# Patient Record
Sex: FEMALE | Race: WHITE | NOT HISPANIC OR LATINO | Employment: OTHER | URBAN - METROPOLITAN AREA
[De-identification: names, ages, dates, MRNs, and addresses within clinical notes are randomized per-mention and may not be internally consistent; named-entity substitution may affect disease eponyms.]

---

## 2017-01-10 ENCOUNTER — OFFICE VISIT - HEALTHEAST (OUTPATIENT)
Dept: CARDIOLOGY | Facility: CLINIC | Age: 77
End: 2017-01-10

## 2017-01-10 ENCOUNTER — AMBULATORY - HEALTHEAST (OUTPATIENT)
Dept: CARDIOLOGY | Facility: CLINIC | Age: 77
End: 2017-01-10

## 2017-01-10 DIAGNOSIS — I10 ESSENTIAL HYPERTENSION WITH GOAL BLOOD PRESSURE LESS THAN 140/90: ICD-10-CM

## 2017-01-10 DIAGNOSIS — R94.31 ABNORMAL EKG: ICD-10-CM

## 2017-01-10 DIAGNOSIS — I49.3 FREQUENT UNIFOCAL PREMATURE VENTRICULAR CONTRACTIONS: ICD-10-CM

## 2017-01-10 ASSESSMENT — MIFFLIN-ST. JEOR: SCORE: 1199.83

## 2017-01-12 ENCOUNTER — HOSPITAL ENCOUNTER (OUTPATIENT)
Dept: CARDIOLOGY | Facility: HOSPITAL | Age: 77
Discharge: HOME | End: 2017-01-12
Attending: INTERNAL MEDICINE

## 2017-01-12 DIAGNOSIS — R94.31 ABNORMAL EKG: ICD-10-CM

## 2017-01-12 LAB
CV STRESS CURRENT BP HE: NORMAL
CV STRESS CURRENT HR HE: 104
CV STRESS CURRENT HR HE: 104
CV STRESS CURRENT HR HE: 107
CV STRESS CURRENT HR HE: 116
CV STRESS CURRENT HR HE: 124
CV STRESS CURRENT HR HE: 125
CV STRESS CURRENT HR HE: 128
CV STRESS CURRENT HR HE: 61
CV STRESS CURRENT HR HE: 62
CV STRESS CURRENT HR HE: 64
CV STRESS CURRENT HR HE: 67
CV STRESS CURRENT HR HE: 67
CV STRESS CURRENT HR HE: 70
CV STRESS CURRENT HR HE: 72
CV STRESS CURRENT HR HE: 74
CV STRESS CURRENT HR HE: 78
CV STRESS CURRENT HR HE: 83
CV STRESS CURRENT HR HE: 85
CV STRESS DEVIATION TIME HE: NORMAL
CV STRESS EXERCISE STAGE HE: NORMAL
CV STRESS FINAL RESTING BP HE: NORMAL
CV STRESS FINAL RESTING HR HE: 64
CV STRESS MAX TREADMILL GRADE HE: 12
CV STRESS MAX TREADMILL SPEED HE: 2.5
CV STRESS PEAK DIA BP HE: NORMAL
CV STRESS PEAK SYS BP HE: NORMAL
CV STRESS PHASE HE: NORMAL
CV STRESS PROTOCOL HE: NORMAL
CV STRESS RESTING PT POSITION HE: NORMAL
CV STRESS RESTING PT POSITION HE: NORMAL
CV STRESS ST DEVIATION AMOUNT HE: NORMAL
CV STRESS ST DEVIATION ELEVATION HE: NORMAL
CV STRESS ST EVELATION AMOUNT HE: NORMAL
CV STRESS TEST TYPE HE: NORMAL
CV STRESS TOTAL STAGE TIME MIN 1 HE: NORMAL
STRESS ECHO BASELINE BP: NORMAL
STRESS ECHO BASELINE HR: 63
STRESS ECHO CALCULATED PERCENT HR: 87
STRESS ECHO LAST STRESS BP: NORMAL
STRESS ECHO LAST STRESS HR: 128
STRESS ECHO POST ESTIMATED WORKLOAD: 6.2
STRESS ECHO POST EXERCISE DUR MIN: NORMAL
STRESS ECHO TARGET HR: 124

## 2017-02-23 ENCOUNTER — COMMUNICATION - HEALTHEAST (OUTPATIENT)
Dept: CARDIOLOGY | Facility: CLINIC | Age: 77
End: 2017-02-23

## 2017-02-23 DIAGNOSIS — I49.3 PVC'S (PREMATURE VENTRICULAR CONTRACTIONS): ICD-10-CM

## 2017-05-30 ENCOUNTER — COMMUNICATION - HEALTHEAST (OUTPATIENT)
Dept: FAMILY MEDICINE | Facility: CLINIC | Age: 77
End: 2017-05-30

## 2017-06-19 ENCOUNTER — OFFICE VISIT - HEALTHEAST (OUTPATIENT)
Dept: FAMILY MEDICINE | Facility: CLINIC | Age: 77
End: 2017-06-19

## 2017-06-19 DIAGNOSIS — E55.9 VITAMIN D DEFICIENCY: ICD-10-CM

## 2017-06-19 DIAGNOSIS — E03.9 HYPOTHYROIDISM: ICD-10-CM

## 2017-06-19 DIAGNOSIS — M17.10 LOCALIZED OSTEOARTHROSIS, LOWER LEG: ICD-10-CM

## 2017-06-19 DIAGNOSIS — I49.3 FREQUENT UNIFOCAL PREMATURE VENTRICULAR CONTRACTIONS: ICD-10-CM

## 2017-06-19 DIAGNOSIS — I10 ESSENTIAL HYPERTENSION WITH GOAL BLOOD PRESSURE LESS THAN 140/90: ICD-10-CM

## 2017-06-19 DIAGNOSIS — L30.9 ECZEMA: ICD-10-CM

## 2017-06-19 DIAGNOSIS — M19.072 ARTHRITIS OF LEFT FOOT: ICD-10-CM

## 2017-06-19 DIAGNOSIS — C50.919 BREAST CANCER (H): ICD-10-CM

## 2017-06-19 DIAGNOSIS — E78.00 PURE HYPERCHOLESTEROLEMIA: ICD-10-CM

## 2017-06-19 DIAGNOSIS — Z00.00 MEDICARE ANNUAL WELLNESS VISIT, INITIAL: ICD-10-CM

## 2017-06-19 DIAGNOSIS — R00.1 SINUS BRADYCARDIA: ICD-10-CM

## 2017-06-19 LAB
CHOLEST SERPL-MCNC: 288 MG/DL
FASTING STATUS PATIENT QL REPORTED: YES
HDLC SERPL-MCNC: 77 MG/DL
LDLC SERPL CALC-MCNC: 197 MG/DL
TRIGL SERPL-MCNC: 69 MG/DL

## 2017-06-19 ASSESSMENT — MIFFLIN-ST. JEOR: SCORE: 1170.8

## 2017-07-03 ENCOUNTER — OFFICE VISIT - HEALTHEAST (OUTPATIENT)
Dept: FAMILY MEDICINE | Facility: CLINIC | Age: 77
End: 2017-07-03

## 2017-07-03 DIAGNOSIS — H66.92 ACUTE OTITIS MEDIA, LEFT: ICD-10-CM

## 2017-07-03 DIAGNOSIS — J06.9 VIRAL URI WITH COUGH: ICD-10-CM

## 2017-07-07 ENCOUNTER — RECORDS - HEALTHEAST (OUTPATIENT)
Dept: BONE DENSITY | Facility: CLINIC | Age: 77
End: 2017-07-07

## 2017-07-07 ENCOUNTER — RECORDS - HEALTHEAST (OUTPATIENT)
Dept: ADMINISTRATIVE | Facility: OTHER | Age: 77
End: 2017-07-07

## 2017-07-07 DIAGNOSIS — Z78.0 ASYMPTOMATIC MENOPAUSAL STATE: ICD-10-CM

## 2017-07-07 DIAGNOSIS — Z00.00 ENCOUNTER FOR GENERAL ADULT MEDICAL EXAMINATION WITHOUT ABNORMAL FINDINGS: ICD-10-CM

## 2017-07-24 ENCOUNTER — OFFICE VISIT - HEALTHEAST (OUTPATIENT)
Dept: FAMILY MEDICINE | Facility: CLINIC | Age: 77
End: 2017-07-24

## 2017-07-24 DIAGNOSIS — H65.93 OME (OTITIS MEDIA WITH EFFUSION), BILATERAL: ICD-10-CM

## 2017-07-24 DIAGNOSIS — J06.9 UPPER RESPIRATORY TRACT INFECTION, UNSPECIFIED TYPE: ICD-10-CM

## 2017-07-24 ASSESSMENT — MIFFLIN-ST. JEOR: SCORE: 1159

## 2017-08-16 ENCOUNTER — COMMUNICATION - HEALTHEAST (OUTPATIENT)
Dept: FAMILY MEDICINE | Facility: CLINIC | Age: 77
End: 2017-08-16

## 2017-08-16 DIAGNOSIS — E03.9 HYPOTHYROIDISM: ICD-10-CM

## 2017-08-31 ENCOUNTER — RECORDS - HEALTHEAST (OUTPATIENT)
Dept: ADMINISTRATIVE | Facility: OTHER | Age: 77
End: 2017-08-31

## 2017-09-01 ENCOUNTER — RECORDS - HEALTHEAST (OUTPATIENT)
Dept: ADMINISTRATIVE | Facility: OTHER | Age: 77
End: 2017-09-01

## 2017-12-30 ENCOUNTER — COMMUNICATION - HEALTHEAST (OUTPATIENT)
Dept: CARDIOLOGY | Facility: CLINIC | Age: 77
End: 2017-12-30

## 2017-12-30 DIAGNOSIS — I10 ESSENTIAL HYPERTENSION WITH GOAL BLOOD PRESSURE LESS THAN 140/90: ICD-10-CM

## 2018-01-19 ENCOUNTER — COMMUNICATION - HEALTHEAST (OUTPATIENT)
Dept: FAMILY MEDICINE | Facility: CLINIC | Age: 78
End: 2018-01-19

## 2018-01-23 ENCOUNTER — COMMUNICATION - HEALTHEAST (OUTPATIENT)
Dept: CARDIOLOGY | Facility: CLINIC | Age: 78
End: 2018-01-23

## 2018-02-07 ENCOUNTER — RECORDS - HEALTHEAST (OUTPATIENT)
Dept: ADMINISTRATIVE | Facility: OTHER | Age: 78
End: 2018-02-07

## 2018-03-31 ENCOUNTER — COMMUNICATION - HEALTHEAST (OUTPATIENT)
Dept: CARDIOLOGY | Facility: CLINIC | Age: 78
End: 2018-03-31

## 2018-03-31 DIAGNOSIS — I10 ESSENTIAL HYPERTENSION WITH GOAL BLOOD PRESSURE LESS THAN 140/90: ICD-10-CM

## 2018-04-02 ENCOUNTER — RECORDS - HEALTHEAST (OUTPATIENT)
Dept: ADMINISTRATIVE | Facility: OTHER | Age: 78
End: 2018-04-02

## 2018-04-04 ENCOUNTER — OFFICE VISIT - HEALTHEAST (OUTPATIENT)
Dept: CARDIOLOGY | Facility: CLINIC | Age: 78
End: 2018-04-04

## 2018-04-04 DIAGNOSIS — I49.3 FREQUENT UNIFOCAL PREMATURE VENTRICULAR CONTRACTIONS: ICD-10-CM

## 2018-04-04 DIAGNOSIS — I10 ESSENTIAL HYPERTENSION WITH GOAL BLOOD PRESSURE LESS THAN 140/90: ICD-10-CM

## 2018-04-04 DIAGNOSIS — I10 ESSENTIAL HYPERTENSION: ICD-10-CM

## 2018-04-04 ASSESSMENT — MIFFLIN-ST. JEOR: SCORE: 1168.08

## 2018-04-09 ENCOUNTER — COMMUNICATION - HEALTHEAST (OUTPATIENT)
Dept: CARDIOLOGY | Facility: CLINIC | Age: 78
End: 2018-04-09

## 2018-04-09 DIAGNOSIS — I10 HTN (HYPERTENSION): ICD-10-CM

## 2018-04-09 DIAGNOSIS — I10 ESSENTIAL HYPERTENSION WITH GOAL BLOOD PRESSURE LESS THAN 140/90: ICD-10-CM

## 2018-04-25 ENCOUNTER — COMMUNICATION - HEALTHEAST (OUTPATIENT)
Dept: CARDIOLOGY | Facility: CLINIC | Age: 78
End: 2018-04-25

## 2018-04-25 DIAGNOSIS — I49.3 PVC'S (PREMATURE VENTRICULAR CONTRACTIONS): ICD-10-CM

## 2018-05-21 ENCOUNTER — COMMUNICATION - HEALTHEAST (OUTPATIENT)
Dept: FAMILY MEDICINE | Facility: CLINIC | Age: 78
End: 2018-05-21

## 2018-05-21 DIAGNOSIS — E03.9 HYPOTHYROIDISM: ICD-10-CM

## 2018-06-28 ENCOUNTER — OFFICE VISIT - HEALTHEAST (OUTPATIENT)
Dept: FAMILY MEDICINE | Facility: CLINIC | Age: 78
End: 2018-06-28

## 2018-06-28 DIAGNOSIS — C50.919 BREAST CANCER (H): ICD-10-CM

## 2018-06-28 DIAGNOSIS — H26.9 CATARACT: ICD-10-CM

## 2018-06-28 DIAGNOSIS — L98.9 SKIN LESION OF FACE: ICD-10-CM

## 2018-06-28 DIAGNOSIS — E78.00 PURE HYPERCHOLESTEROLEMIA: ICD-10-CM

## 2018-06-28 DIAGNOSIS — E03.9 ACQUIRED HYPOTHYROIDISM: ICD-10-CM

## 2018-06-28 DIAGNOSIS — M17.10 LOCALIZED OSTEOARTHROSIS, LOWER LEG: ICD-10-CM

## 2018-06-28 DIAGNOSIS — E55.9 VITAMIN D DEFICIENCY: ICD-10-CM

## 2018-06-28 DIAGNOSIS — I10 ESSENTIAL HYPERTENSION: ICD-10-CM

## 2018-06-28 DIAGNOSIS — Z00.00 MEDICARE ANNUAL WELLNESS VISIT, SUBSEQUENT: ICD-10-CM

## 2018-06-28 DIAGNOSIS — I49.3 FREQUENT UNIFOCAL PREMATURE VENTRICULAR CONTRACTIONS: ICD-10-CM

## 2018-06-28 DIAGNOSIS — R00.1 SINUS BRADYCARDIA: ICD-10-CM

## 2018-06-28 LAB
ALBUMIN SERPL-MCNC: 4.1 G/DL (ref 3.5–5)
ALP SERPL-CCNC: 65 U/L (ref 45–120)
ALT SERPL W P-5'-P-CCNC: 19 U/L (ref 0–45)
ANION GAP SERPL CALCULATED.3IONS-SCNC: 7 MMOL/L (ref 5–18)
AST SERPL W P-5'-P-CCNC: 23 U/L (ref 0–40)
BILIRUB SERPL-MCNC: 0.6 MG/DL (ref 0–1)
BUN SERPL-MCNC: 19 MG/DL (ref 8–28)
CALCIUM SERPL-MCNC: 10.3 MG/DL (ref 8.5–10.5)
CHLORIDE BLD-SCNC: 106 MMOL/L (ref 98–107)
CHOLEST SERPL-MCNC: 305 MG/DL
CO2 SERPL-SCNC: 27 MMOL/L (ref 22–31)
CREAT SERPL-MCNC: 1.01 MG/DL (ref 0.6–1.1)
ERYTHROCYTE [DISTWIDTH] IN BLOOD BY AUTOMATED COUNT: 12.2 % (ref 11–14.5)
FASTING STATUS PATIENT QL REPORTED: YES
GFR SERPL CREATININE-BSD FRML MDRD: 53 ML/MIN/1.73M2
GLUCOSE BLD-MCNC: 85 MG/DL (ref 70–125)
HCT VFR BLD AUTO: 39.9 % (ref 35–47)
HDLC SERPL-MCNC: 85 MG/DL
HGB BLD-MCNC: 13.4 G/DL (ref 12–16)
LDLC SERPL CALC-MCNC: 204 MG/DL
MCH RBC QN AUTO: 30.5 PG (ref 27–34)
MCHC RBC AUTO-ENTMCNC: 33.6 G/DL (ref 32–36)
MCV RBC AUTO: 91 FL (ref 80–100)
PLATELET # BLD AUTO: 232 THOU/UL (ref 140–440)
PMV BLD AUTO: 7.2 FL (ref 7–10)
POTASSIUM BLD-SCNC: 4.9 MMOL/L (ref 3.5–5)
PROT SERPL-MCNC: 6.6 G/DL (ref 6–8)
RBC # BLD AUTO: 4.39 MILL/UL (ref 3.8–5.4)
SODIUM SERPL-SCNC: 140 MMOL/L (ref 136–145)
TRIGL SERPL-MCNC: 79 MG/DL
TSH SERPL DL<=0.005 MIU/L-ACNC: 2.68 UIU/ML (ref 0.3–5)
WBC: 4.4 THOU/UL (ref 4–11)

## 2018-06-28 ASSESSMENT — MIFFLIN-ST. JEOR: SCORE: 1169.89

## 2018-06-29 LAB
25(OH)D3 SERPL-MCNC: 37.3 NG/ML (ref 30–80)
25(OH)D3 SERPL-MCNC: 37.3 NG/ML (ref 30–80)

## 2018-08-06 ENCOUNTER — COMMUNICATION - HEALTHEAST (OUTPATIENT)
Dept: SCHEDULING | Facility: CLINIC | Age: 78
End: 2018-08-06

## 2018-08-07 ENCOUNTER — RECORDS - HEALTHEAST (OUTPATIENT)
Dept: ADMINISTRATIVE | Facility: OTHER | Age: 78
End: 2018-08-07

## 2018-08-07 ENCOUNTER — OFFICE VISIT - HEALTHEAST (OUTPATIENT)
Dept: FAMILY MEDICINE | Facility: CLINIC | Age: 78
End: 2018-08-07

## 2018-08-07 DIAGNOSIS — R07.81 RIB PAIN ON RIGHT SIDE: ICD-10-CM

## 2018-08-07 DIAGNOSIS — S09.93XA FACIAL INJURY, INITIAL ENCOUNTER: ICD-10-CM

## 2018-08-07 DIAGNOSIS — R07.81 RIB PAIN ON LEFT SIDE: ICD-10-CM

## 2018-08-07 RX ORDER — LATANOPROST 50 UG/ML
1 SOLUTION/ DROPS OPHTHALMIC AT BEDTIME
Refills: 3 | Status: SHIPPED | COMMUNITY
Start: 2018-07-05

## 2018-08-13 ENCOUNTER — COMMUNICATION - HEALTHEAST (OUTPATIENT)
Dept: FAMILY MEDICINE | Facility: CLINIC | Age: 78
End: 2018-08-13

## 2018-08-18 ENCOUNTER — COMMUNICATION - HEALTHEAST (OUTPATIENT)
Dept: FAMILY MEDICINE | Facility: CLINIC | Age: 78
End: 2018-08-18

## 2018-08-18 DIAGNOSIS — E03.9 HYPOTHYROIDISM: ICD-10-CM

## 2018-08-22 ENCOUNTER — OFFICE VISIT - HEALTHEAST (OUTPATIENT)
Dept: FAMILY MEDICINE | Facility: CLINIC | Age: 78
End: 2018-08-22

## 2018-08-22 DIAGNOSIS — R00.1 SINUS BRADYCARDIA: ICD-10-CM

## 2018-08-22 DIAGNOSIS — I49.3 FREQUENT UNIFOCAL PREMATURE VENTRICULAR CONTRACTIONS: ICD-10-CM

## 2018-08-22 DIAGNOSIS — C50.919 BREAST CANCER (H): ICD-10-CM

## 2018-08-22 DIAGNOSIS — E03.9 ACQUIRED HYPOTHYROIDISM: ICD-10-CM

## 2018-08-22 DIAGNOSIS — E78.00 PURE HYPERCHOLESTEROLEMIA: ICD-10-CM

## 2018-08-22 DIAGNOSIS — Z01.818 PREOP GENERAL PHYSICAL EXAM: ICD-10-CM

## 2018-08-22 DIAGNOSIS — H26.9 CATARACT: ICD-10-CM

## 2018-08-22 DIAGNOSIS — I10 ESSENTIAL HYPERTENSION: ICD-10-CM

## 2018-08-22 ASSESSMENT — MIFFLIN-ST. JEOR: SCORE: 1156.28

## 2018-09-24 ENCOUNTER — COMMUNICATION - HEALTHEAST (OUTPATIENT)
Dept: FAMILY MEDICINE | Facility: CLINIC | Age: 78
End: 2018-09-24

## 2018-09-24 DIAGNOSIS — E03.9 HYPOTHYROIDISM: ICD-10-CM

## 2018-09-30 ENCOUNTER — COMMUNICATION - HEALTHEAST (OUTPATIENT)
Dept: CARDIOLOGY | Facility: CLINIC | Age: 78
End: 2018-09-30

## 2018-09-30 DIAGNOSIS — I10 HTN (HYPERTENSION): ICD-10-CM

## 2019-01-03 ENCOUNTER — RECORDS - HEALTHEAST (OUTPATIENT)
Dept: ADMINISTRATIVE | Facility: OTHER | Age: 79
End: 2019-01-03

## 2019-01-08 ENCOUNTER — RECORDS - HEALTHEAST (OUTPATIENT)
Dept: ADMINISTRATIVE | Facility: OTHER | Age: 79
End: 2019-01-08

## 2019-01-24 ENCOUNTER — COMMUNICATION - HEALTHEAST (OUTPATIENT)
Dept: CARDIOLOGY | Facility: CLINIC | Age: 79
End: 2019-01-24

## 2019-01-24 DIAGNOSIS — I49.3 PVC'S (PREMATURE VENTRICULAR CONTRACTIONS): ICD-10-CM

## 2019-01-29 ENCOUNTER — RECORDS - HEALTHEAST (OUTPATIENT)
Dept: ADMINISTRATIVE | Facility: OTHER | Age: 79
End: 2019-01-29

## 2019-02-26 ENCOUNTER — RECORDS - HEALTHEAST (OUTPATIENT)
Dept: ADMINISTRATIVE | Facility: OTHER | Age: 79
End: 2019-02-26

## 2019-03-08 ENCOUNTER — OFFICE VISIT - HEALTHEAST (OUTPATIENT)
Dept: FAMILY MEDICINE | Facility: CLINIC | Age: 79
End: 2019-03-08

## 2019-03-08 DIAGNOSIS — E03.9 ACQUIRED HYPOTHYROIDISM: ICD-10-CM

## 2019-03-08 DIAGNOSIS — Z01.818 PRE-OP EXAM: ICD-10-CM

## 2019-03-08 DIAGNOSIS — I10 ESSENTIAL HYPERTENSION: ICD-10-CM

## 2019-03-08 DIAGNOSIS — M17.10 LOCALIZED OSTEOARTHROSIS, LOWER LEG: ICD-10-CM

## 2019-03-08 DIAGNOSIS — I49.3 FREQUENT UNIFOCAL PREMATURE VENTRICULAR CONTRACTIONS: ICD-10-CM

## 2019-03-08 DIAGNOSIS — E78.00 PURE HYPERCHOLESTEROLEMIA: ICD-10-CM

## 2019-03-08 DIAGNOSIS — R00.1 SINUS BRADYCARDIA: ICD-10-CM

## 2019-03-08 LAB
ALBUMIN SERPL-MCNC: 3.9 G/DL (ref 3.5–5)
ALP SERPL-CCNC: 68 U/L (ref 45–120)
ALT SERPL W P-5'-P-CCNC: 24 U/L (ref 0–45)
ANION GAP SERPL CALCULATED.3IONS-SCNC: 10 MMOL/L (ref 5–18)
AST SERPL W P-5'-P-CCNC: 26 U/L (ref 0–40)
ATRIAL RATE - MUSE: 64 BPM
BASOPHILS # BLD AUTO: 0 THOU/UL (ref 0–0.2)
BASOPHILS NFR BLD AUTO: 0 % (ref 0–2)
BILIRUB SERPL-MCNC: 0.4 MG/DL (ref 0–1)
BUN SERPL-MCNC: 27 MG/DL (ref 8–28)
CALCIUM SERPL-MCNC: 10.8 MG/DL (ref 8.5–10.5)
CHLORIDE BLD-SCNC: 106 MMOL/L (ref 98–107)
CHOLEST SERPL-MCNC: 296 MG/DL
CO2 SERPL-SCNC: 27 MMOL/L (ref 22–31)
CREAT SERPL-MCNC: 1.46 MG/DL (ref 0.6–1.1)
DIASTOLIC BLOOD PRESSURE - MUSE: NORMAL MMHG
EOSINOPHIL # BLD AUTO: 0.1 THOU/UL (ref 0–0.4)
EOSINOPHIL NFR BLD AUTO: 2 % (ref 0–6)
ERYTHROCYTE [DISTWIDTH] IN BLOOD BY AUTOMATED COUNT: 12.7 % (ref 11–14.5)
FASTING STATUS PATIENT QL REPORTED: NO
GFR SERPL CREATININE-BSD FRML MDRD: 35 ML/MIN/1.73M2
GLUCOSE BLD-MCNC: 96 MG/DL (ref 70–125)
HCT VFR BLD AUTO: 41.2 % (ref 35–47)
HDLC SERPL-MCNC: 89 MG/DL
HGB BLD-MCNC: 13.4 G/DL (ref 12–16)
INTERPRETATION ECG - MUSE: NORMAL
LDLC SERPL CALC-MCNC: 185 MG/DL
LYMPHOCYTES # BLD AUTO: 1.4 THOU/UL (ref 0.8–4.4)
LYMPHOCYTES NFR BLD AUTO: 25 % (ref 20–40)
MCH RBC QN AUTO: 30.2 PG (ref 27–34)
MCHC RBC AUTO-ENTMCNC: 32.5 G/DL (ref 32–36)
MCV RBC AUTO: 93 FL (ref 80–100)
MONOCYTES # BLD AUTO: 0.4 THOU/UL (ref 0–0.9)
MONOCYTES NFR BLD AUTO: 8 % (ref 2–10)
NEUTROPHILS # BLD AUTO: 3.6 THOU/UL (ref 2–7.7)
NEUTROPHILS NFR BLD AUTO: 65 % (ref 50–70)
P AXIS - MUSE: 55 DEGREES
PLATELET # BLD AUTO: 236 THOU/UL (ref 140–440)
PMV BLD AUTO: 7.4 FL (ref 7–10)
POTASSIUM BLD-SCNC: 5 MMOL/L (ref 3.5–5)
PR INTERVAL - MUSE: 158 MS
PROT SERPL-MCNC: 6.5 G/DL (ref 6–8)
QRS DURATION - MUSE: 90 MS
QT - MUSE: 410 MS
QTC - MUSE: 422 MS
R AXIS - MUSE: -28 DEGREES
RBC # BLD AUTO: 4.44 MILL/UL (ref 3.8–5.4)
SODIUM SERPL-SCNC: 143 MMOL/L (ref 136–145)
SYSTOLIC BLOOD PRESSURE - MUSE: NORMAL MMHG
T AXIS - MUSE: 34 DEGREES
TRIGL SERPL-MCNC: 109 MG/DL
TSH SERPL DL<=0.005 MIU/L-ACNC: 3.44 UIU/ML (ref 0.3–5)
VENTRICULAR RATE- MUSE: 64 BPM
WBC: 5.5 THOU/UL (ref 4–11)

## 2019-03-08 ASSESSMENT — MIFFLIN-ST. JEOR: SCORE: 1176.69

## 2019-03-11 ENCOUNTER — COMMUNICATION - HEALTHEAST (OUTPATIENT)
Dept: FAMILY MEDICINE | Facility: CLINIC | Age: 79
End: 2019-03-11

## 2019-03-13 ENCOUNTER — COMMUNICATION - HEALTHEAST (OUTPATIENT)
Dept: SCHEDULING | Facility: CLINIC | Age: 79
End: 2019-03-13

## 2019-03-13 ENCOUNTER — OFFICE VISIT - HEALTHEAST (OUTPATIENT)
Dept: FAMILY MEDICINE | Facility: CLINIC | Age: 79
End: 2019-03-13

## 2019-03-13 DIAGNOSIS — J06.9 VIRAL UPPER RESPIRATORY TRACT INFECTION: ICD-10-CM

## 2019-03-13 LAB — DEPRECATED S PYO AG THROAT QL EIA: NORMAL

## 2019-03-14 LAB — GROUP A STREP BY PCR: NORMAL

## 2019-03-20 ENCOUNTER — ANESTHESIA - HEALTHEAST (OUTPATIENT)
Dept: SURGERY | Facility: CLINIC | Age: 79
End: 2019-03-20

## 2019-03-21 ENCOUNTER — SURGERY - HEALTHEAST (OUTPATIENT)
Dept: SURGERY | Facility: CLINIC | Age: 79
End: 2019-03-21

## 2019-03-21 ENCOUNTER — AMBULATORY - HEALTHEAST (OUTPATIENT)
Dept: OTHER | Facility: CLINIC | Age: 79
End: 2019-03-21

## 2019-03-21 ASSESSMENT — MIFFLIN-ST. JEOR: SCORE: 1160.82

## 2019-03-22 ENCOUNTER — HOME CARE/HOSPICE - HEALTHEAST (OUTPATIENT)
Dept: HOME HEALTH SERVICES | Facility: HOME HEALTH | Age: 79
End: 2019-03-22

## 2019-03-24 ENCOUNTER — HOME CARE/HOSPICE - HEALTHEAST (OUTPATIENT)
Dept: HOME HEALTH SERVICES | Facility: HOME HEALTH | Age: 79
End: 2019-03-24

## 2019-03-24 ENCOUNTER — RECORDS - HEALTHEAST (OUTPATIENT)
Dept: ADMINISTRATIVE | Facility: OTHER | Age: 79
End: 2019-03-24

## 2019-03-25 ENCOUNTER — HOME CARE/HOSPICE - HEALTHEAST (OUTPATIENT)
Dept: HOME HEALTH SERVICES | Facility: HOME HEALTH | Age: 79
End: 2019-03-25

## 2019-03-25 ENCOUNTER — RECORDS - HEALTHEAST (OUTPATIENT)
Dept: ADMINISTRATIVE | Facility: OTHER | Age: 79
End: 2019-03-25

## 2019-03-26 ENCOUNTER — COMMUNICATION - HEALTHEAST (OUTPATIENT)
Dept: CARE COORDINATION | Facility: CLINIC | Age: 79
End: 2019-03-26

## 2019-03-26 ENCOUNTER — HOME CARE/HOSPICE - HEALTHEAST (OUTPATIENT)
Dept: HOME HEALTH SERVICES | Facility: HOME HEALTH | Age: 79
End: 2019-03-26

## 2019-03-27 ENCOUNTER — HOME CARE/HOSPICE - HEALTHEAST (OUTPATIENT)
Dept: HOME HEALTH SERVICES | Facility: HOME HEALTH | Age: 79
End: 2019-03-27

## 2019-03-29 ENCOUNTER — HOME CARE/HOSPICE - HEALTHEAST (OUTPATIENT)
Dept: HOME HEALTH SERVICES | Facility: HOME HEALTH | Age: 79
End: 2019-03-29

## 2019-03-30 ENCOUNTER — HOME CARE/HOSPICE - HEALTHEAST (OUTPATIENT)
Dept: HOME HEALTH SERVICES | Facility: HOME HEALTH | Age: 79
End: 2019-03-30

## 2019-04-01 ENCOUNTER — HOME CARE/HOSPICE - HEALTHEAST (OUTPATIENT)
Dept: HOME HEALTH SERVICES | Facility: HOME HEALTH | Age: 79
End: 2019-04-01

## 2019-04-02 ENCOUNTER — HOME CARE/HOSPICE - HEALTHEAST (OUTPATIENT)
Dept: HOME HEALTH SERVICES | Facility: HOME HEALTH | Age: 79
End: 2019-04-02

## 2019-04-03 ENCOUNTER — HOME CARE/HOSPICE - HEALTHEAST (OUTPATIENT)
Dept: HOME HEALTH SERVICES | Facility: HOME HEALTH | Age: 79
End: 2019-04-03

## 2019-04-04 ENCOUNTER — HOME CARE/HOSPICE - HEALTHEAST (OUTPATIENT)
Dept: HOME HEALTH SERVICES | Facility: HOME HEALTH | Age: 79
End: 2019-04-04

## 2019-04-04 ENCOUNTER — COMMUNICATION - HEALTHEAST (OUTPATIENT)
Dept: HOME HEALTH SERVICES | Facility: HOME HEALTH | Age: 79
End: 2019-04-04

## 2019-04-05 ENCOUNTER — HOME CARE/HOSPICE - HEALTHEAST (OUTPATIENT)
Dept: HOME HEALTH SERVICES | Facility: HOME HEALTH | Age: 79
End: 2019-04-05

## 2019-04-05 ENCOUNTER — AMBULATORY - HEALTHEAST (OUTPATIENT)
Dept: ADMINISTRATIVE | Facility: REHABILITATION | Age: 79
End: 2019-04-05

## 2019-04-05 DIAGNOSIS — Z47.1 ORTHOPEDIC AFTERCARE FOR JOINT REPLACEMENT: ICD-10-CM

## 2019-04-08 ENCOUNTER — RECORDS - HEALTHEAST (OUTPATIENT)
Dept: ADMINISTRATIVE | Facility: OTHER | Age: 79
End: 2019-04-08

## 2019-04-09 ENCOUNTER — HOME CARE/HOSPICE - HEALTHEAST (OUTPATIENT)
Dept: HOME HEALTH SERVICES | Facility: HOME HEALTH | Age: 79
End: 2019-04-09

## 2019-04-10 ENCOUNTER — OFFICE VISIT - HEALTHEAST (OUTPATIENT)
Dept: PHYSICAL THERAPY | Facility: REHABILITATION | Age: 79
End: 2019-04-10

## 2019-04-10 DIAGNOSIS — G89.29 CHRONIC PAIN OF LEFT KNEE: ICD-10-CM

## 2019-04-10 DIAGNOSIS — M25.562 CHRONIC PAIN OF LEFT KNEE: ICD-10-CM

## 2019-04-10 DIAGNOSIS — R26.9 ABNORMALITY OF GAIT: ICD-10-CM

## 2019-04-15 ENCOUNTER — OFFICE VISIT - HEALTHEAST (OUTPATIENT)
Dept: PHYSICAL THERAPY | Facility: REHABILITATION | Age: 79
End: 2019-04-15

## 2019-04-15 DIAGNOSIS — R26.9 ABNORMALITY OF GAIT: ICD-10-CM

## 2019-04-15 DIAGNOSIS — M25.562 CHRONIC PAIN OF LEFT KNEE: ICD-10-CM

## 2019-04-15 DIAGNOSIS — G89.29 CHRONIC PAIN OF LEFT KNEE: ICD-10-CM

## 2019-04-18 ENCOUNTER — OFFICE VISIT - HEALTHEAST (OUTPATIENT)
Dept: PHYSICAL THERAPY | Facility: REHABILITATION | Age: 79
End: 2019-04-18

## 2019-04-18 DIAGNOSIS — R26.9 ABNORMALITY OF GAIT: ICD-10-CM

## 2019-04-18 DIAGNOSIS — M25.562 CHRONIC PAIN OF LEFT KNEE: ICD-10-CM

## 2019-04-18 DIAGNOSIS — G89.29 CHRONIC PAIN OF LEFT KNEE: ICD-10-CM

## 2019-04-22 ENCOUNTER — OFFICE VISIT - HEALTHEAST (OUTPATIENT)
Dept: PHYSICAL THERAPY | Facility: REHABILITATION | Age: 79
End: 2019-04-22

## 2019-04-22 DIAGNOSIS — G89.29 CHRONIC PAIN OF LEFT KNEE: ICD-10-CM

## 2019-04-22 DIAGNOSIS — M25.562 CHRONIC PAIN OF LEFT KNEE: ICD-10-CM

## 2019-04-22 DIAGNOSIS — R26.9 ABNORMALITY OF GAIT: ICD-10-CM

## 2019-04-25 ENCOUNTER — OFFICE VISIT - HEALTHEAST (OUTPATIENT)
Dept: PHYSICAL THERAPY | Facility: REHABILITATION | Age: 79
End: 2019-04-25

## 2019-04-25 DIAGNOSIS — G89.29 CHRONIC PAIN OF LEFT KNEE: ICD-10-CM

## 2019-04-25 DIAGNOSIS — M25.562 CHRONIC PAIN OF LEFT KNEE: ICD-10-CM

## 2019-04-25 DIAGNOSIS — R26.9 ABNORMALITY OF GAIT: ICD-10-CM

## 2019-04-30 ENCOUNTER — OFFICE VISIT - HEALTHEAST (OUTPATIENT)
Dept: FAMILY MEDICINE | Facility: CLINIC | Age: 79
End: 2019-04-30

## 2019-04-30 DIAGNOSIS — H92.01 RIGHT EAR PAIN: ICD-10-CM

## 2019-04-30 ASSESSMENT — MIFFLIN-ST. JEOR: SCORE: 1183.5

## 2019-05-06 ENCOUNTER — RECORDS - HEALTHEAST (OUTPATIENT)
Dept: ADMINISTRATIVE | Facility: OTHER | Age: 79
End: 2019-05-06

## 2019-06-25 ENCOUNTER — RECORDS - HEALTHEAST (OUTPATIENT)
Dept: ADMINISTRATIVE | Facility: OTHER | Age: 79
End: 2019-06-25

## 2019-07-05 ENCOUNTER — COMMUNICATION - HEALTHEAST (OUTPATIENT)
Dept: CARDIOLOGY | Facility: CLINIC | Age: 79
End: 2019-07-05

## 2019-07-05 DIAGNOSIS — I10 HTN (HYPERTENSION): ICD-10-CM

## 2019-09-11 ENCOUNTER — COMMUNICATION - HEALTHEAST (OUTPATIENT)
Dept: FAMILY MEDICINE | Facility: CLINIC | Age: 79
End: 2019-09-11

## 2019-09-11 DIAGNOSIS — E03.9 HYPOTHYROIDISM: ICD-10-CM

## 2019-09-23 ENCOUNTER — RECORDS - HEALTHEAST (OUTPATIENT)
Dept: ADMINISTRATIVE | Facility: OTHER | Age: 79
End: 2019-09-23

## 2019-10-01 ENCOUNTER — COMMUNICATION - HEALTHEAST (OUTPATIENT)
Dept: CARDIOLOGY | Facility: CLINIC | Age: 79
End: 2019-10-01

## 2019-10-01 DIAGNOSIS — I10 HTN (HYPERTENSION): ICD-10-CM

## 2019-10-02 ENCOUNTER — OFFICE VISIT - HEALTHEAST (OUTPATIENT)
Dept: CARDIOLOGY | Facility: CLINIC | Age: 79
End: 2019-10-02

## 2019-10-02 DIAGNOSIS — I10 ESSENTIAL HYPERTENSION: ICD-10-CM

## 2019-10-02 DIAGNOSIS — I49.3 FREQUENT UNIFOCAL PREMATURE VENTRICULAR CONTRACTIONS: ICD-10-CM

## 2019-10-02 DIAGNOSIS — I49.3 PVC'S (PREMATURE VENTRICULAR CONTRACTIONS): ICD-10-CM

## 2019-10-02 ASSESSMENT — MIFFLIN-ST. JEOR: SCORE: 1173.18

## 2019-10-09 ENCOUNTER — HOSPITAL ENCOUNTER (OUTPATIENT)
Dept: CARDIOLOGY | Facility: HOSPITAL | Age: 79
Discharge: HOME | End: 2019-10-09
Attending: INTERNAL MEDICINE

## 2019-10-09 DIAGNOSIS — I49.3 FREQUENT UNIFOCAL PREMATURE VENTRICULAR CONTRACTIONS: ICD-10-CM

## 2019-11-20 ENCOUNTER — COMMUNICATION - HEALTHEAST (OUTPATIENT)
Dept: CARDIOLOGY | Facility: CLINIC | Age: 79
End: 2019-11-20

## 2019-11-20 DIAGNOSIS — I49.3 PVC'S (PREMATURE VENTRICULAR CONTRACTIONS): ICD-10-CM

## 2019-12-31 ENCOUNTER — COMMUNICATION - HEALTHEAST (OUTPATIENT)
Dept: CARDIOLOGY | Facility: CLINIC | Age: 79
End: 2019-12-31

## 2019-12-31 DIAGNOSIS — I10 HTN (HYPERTENSION): ICD-10-CM

## 2020-03-21 ENCOUNTER — COMMUNICATION - HEALTHEAST (OUTPATIENT)
Dept: FAMILY MEDICINE | Facility: CLINIC | Age: 80
End: 2020-03-21

## 2020-03-21 DIAGNOSIS — E03.9 HYPOTHYROIDISM: ICD-10-CM

## 2020-06-18 ENCOUNTER — COMMUNICATION - HEALTHEAST (OUTPATIENT)
Dept: FAMILY MEDICINE | Facility: CLINIC | Age: 80
End: 2020-06-18

## 2020-06-18 DIAGNOSIS — E03.9 HYPOTHYROIDISM: ICD-10-CM

## 2020-06-25 ENCOUNTER — COMMUNICATION - HEALTHEAST (OUTPATIENT)
Dept: FAMILY MEDICINE | Facility: CLINIC | Age: 80
End: 2020-06-25

## 2020-06-25 DIAGNOSIS — E03.9 HYPOTHYROIDISM, UNSPECIFIED TYPE: ICD-10-CM

## 2020-06-25 DIAGNOSIS — E78.00 PURE HYPERCHOLESTEROLEMIA: ICD-10-CM

## 2020-06-25 DIAGNOSIS — I10 ESSENTIAL HYPERTENSION: ICD-10-CM

## 2020-06-25 DIAGNOSIS — D64.9 ANEMIA, UNSPECIFIED TYPE: ICD-10-CM

## 2020-06-30 ENCOUNTER — AMBULATORY - HEALTHEAST (OUTPATIENT)
Dept: LAB | Facility: CLINIC | Age: 80
End: 2020-06-30

## 2020-06-30 DIAGNOSIS — I10 ESSENTIAL HYPERTENSION: ICD-10-CM

## 2020-06-30 DIAGNOSIS — E03.9 HYPOTHYROIDISM, UNSPECIFIED TYPE: ICD-10-CM

## 2020-06-30 DIAGNOSIS — E78.00 PURE HYPERCHOLESTEROLEMIA: ICD-10-CM

## 2020-06-30 DIAGNOSIS — D64.9 ANEMIA, UNSPECIFIED TYPE: ICD-10-CM

## 2020-06-30 LAB
ALBUMIN SERPL-MCNC: 4.1 G/DL (ref 3.5–5)
ALP SERPL-CCNC: 76 U/L (ref 45–120)
ALT SERPL W P-5'-P-CCNC: 21 U/L (ref 0–45)
ANION GAP SERPL CALCULATED.3IONS-SCNC: 9 MMOL/L (ref 5–18)
AST SERPL W P-5'-P-CCNC: 23 U/L (ref 0–40)
BILIRUB SERPL-MCNC: 0.4 MG/DL (ref 0–1)
BUN SERPL-MCNC: 24 MG/DL (ref 8–28)
CALCIUM SERPL-MCNC: 9.9 MG/DL (ref 8.5–10.5)
CHLORIDE BLD-SCNC: 105 MMOL/L (ref 98–107)
CHOLEST SERPL-MCNC: 309 MG/DL
CO2 SERPL-SCNC: 26 MMOL/L (ref 22–31)
CREAT SERPL-MCNC: 1.2 MG/DL (ref 0.6–1.1)
ERYTHROCYTE [DISTWIDTH] IN BLOOD BY AUTOMATED COUNT: 12.6 % (ref 11–14.5)
FASTING STATUS PATIENT QL REPORTED: NO
GFR SERPL CREATININE-BSD FRML MDRD: 44 ML/MIN/1.73M2
GLUCOSE BLD-MCNC: 87 MG/DL (ref 70–125)
HCT VFR BLD AUTO: 40.1 % (ref 35–47)
HDLC SERPL-MCNC: 83 MG/DL
HGB BLD-MCNC: 13.4 G/DL (ref 12–16)
LDLC SERPL CALC-MCNC: 211 MG/DL
MCH RBC QN AUTO: 29.9 PG (ref 27–34)
MCHC RBC AUTO-ENTMCNC: 33.5 G/DL (ref 32–36)
MCV RBC AUTO: 89 FL (ref 80–100)
PLATELET # BLD AUTO: 215 THOU/UL (ref 140–440)
PMV BLD AUTO: 7.4 FL (ref 7–10)
POTASSIUM BLD-SCNC: 5 MMOL/L (ref 3.5–5)
PROT SERPL-MCNC: 6.6 G/DL (ref 6–8)
RBC # BLD AUTO: 4.5 MILL/UL (ref 3.8–5.4)
SODIUM SERPL-SCNC: 140 MMOL/L (ref 136–145)
TRIGL SERPL-MCNC: 77 MG/DL
TSH SERPL DL<=0.005 MIU/L-ACNC: 2.36 UIU/ML (ref 0.3–5)
WBC: 4.3 THOU/UL (ref 4–11)

## 2020-07-09 ENCOUNTER — OFFICE VISIT - HEALTHEAST (OUTPATIENT)
Dept: FAMILY MEDICINE | Facility: CLINIC | Age: 80
End: 2020-07-09

## 2020-07-09 DIAGNOSIS — E78.00 PURE HYPERCHOLESTEROLEMIA: ICD-10-CM

## 2020-07-09 DIAGNOSIS — I10 ESSENTIAL HYPERTENSION: ICD-10-CM

## 2020-07-09 DIAGNOSIS — Z12.31 ENCOUNTER FOR SCREENING MAMMOGRAM FOR BREAST CANCER: ICD-10-CM

## 2020-07-09 DIAGNOSIS — E03.9 HYPOTHYROIDISM: ICD-10-CM

## 2020-07-09 DIAGNOSIS — N18.30 CKD (CHRONIC KIDNEY DISEASE) STAGE 3, GFR 30-59 ML/MIN (H): ICD-10-CM

## 2020-07-09 RX ORDER — LEVOTHYROXINE SODIUM 25 UG/1
TABLET ORAL
Qty: 90 TABLET | Refills: 3 | Status: SHIPPED | OUTPATIENT
Start: 2020-07-09 | End: 2021-07-17

## 2020-07-09 NOTE — ASSESSMENT & PLAN NOTE
LDL at 211 on recent draw; discussed treatment of high cholesterol, risk of cardiovascular disease, and consideration to proceed with aCT coronary calcium test to determine relative risk and need to treat. She would like to discuss with her son who is a physician.

## 2020-08-03 ENCOUNTER — HOSPITAL ENCOUNTER (OUTPATIENT)
Dept: MAMMOGRAPHY | Facility: CLINIC | Age: 80
Discharge: HOME | End: 2020-08-03
Attending: FAMILY MEDICINE

## 2020-08-03 DIAGNOSIS — Z12.31 ENCOUNTER FOR SCREENING MAMMOGRAM FOR BREAST CANCER: ICD-10-CM

## 2020-10-11 ENCOUNTER — COMMUNICATION - HEALTHEAST (OUTPATIENT)
Dept: CARDIOLOGY | Facility: CLINIC | Age: 80
End: 2020-10-11

## 2020-10-11 DIAGNOSIS — I49.3 PVC'S (PREMATURE VENTRICULAR CONTRACTIONS): ICD-10-CM

## 2020-10-15 ENCOUNTER — COMMUNICATION - HEALTHEAST (OUTPATIENT)
Dept: CARDIOLOGY | Facility: CLINIC | Age: 80
End: 2020-10-15

## 2020-10-15 DIAGNOSIS — I10 HTN (HYPERTENSION): ICD-10-CM

## 2020-11-04 ENCOUNTER — COMMUNICATION - HEALTHEAST (OUTPATIENT)
Dept: CARDIOLOGY | Facility: CLINIC | Age: 80
End: 2020-11-04

## 2020-11-06 ENCOUNTER — OFFICE VISIT - HEALTHEAST (OUTPATIENT)
Dept: CARDIOLOGY | Facility: CLINIC | Age: 80
End: 2020-11-06

## 2020-11-06 DIAGNOSIS — R94.31 ABNORMAL ELECTROCARDIOGRAM (ECG) (EKG): ICD-10-CM

## 2020-11-06 DIAGNOSIS — I10 ESSENTIAL HYPERTENSION: ICD-10-CM

## 2020-11-06 DIAGNOSIS — I49.3 PVC'S (PREMATURE VENTRICULAR CONTRACTIONS): ICD-10-CM

## 2020-11-06 DIAGNOSIS — I49.3 FREQUENT UNIFOCAL PREMATURE VENTRICULAR CONTRACTIONS: ICD-10-CM

## 2020-11-06 ASSESSMENT — MIFFLIN-ST. JEOR: SCORE: 1195.29

## 2020-12-30 ENCOUNTER — COMMUNICATION - HEALTHEAST (OUTPATIENT)
Dept: CARDIOLOGY | Facility: CLINIC | Age: 80
End: 2020-12-30

## 2020-12-30 DIAGNOSIS — I49.3 FREQUENT UNIFOCAL PREMATURE VENTRICULAR CONTRACTIONS: ICD-10-CM

## 2020-12-30 RX ORDER — PROPAFENONE HYDROCHLORIDE 150 MG/1
150 TABLET, COATED ORAL 2 TIMES DAILY
Qty: 180 TABLET | Refills: 3 | Status: SHIPPED | OUTPATIENT
Start: 2020-12-30 | End: 2022-02-21

## 2021-01-14 ENCOUNTER — COMMUNICATION - HEALTHEAST (OUTPATIENT)
Dept: CARDIOLOGY | Facility: CLINIC | Age: 81
End: 2021-01-14

## 2021-01-14 DIAGNOSIS — I10 HTN (HYPERTENSION): ICD-10-CM

## 2021-01-14 RX ORDER — LISINOPRIL 10 MG/1
10 TABLET ORAL DAILY
Qty: 90 TABLET | Refills: 1 | Status: SHIPPED | OUTPATIENT
Start: 2021-01-14 | End: 2021-07-14

## 2021-03-01 ENCOUNTER — AMBULATORY - HEALTHEAST (OUTPATIENT)
Dept: NURSING | Facility: CLINIC | Age: 81
End: 2021-03-01

## 2021-03-22 ENCOUNTER — AMBULATORY - HEALTHEAST (OUTPATIENT)
Dept: NURSING | Facility: CLINIC | Age: 81
End: 2021-03-22

## 2021-03-24 ENCOUNTER — OFFICE VISIT - HEALTHEAST (OUTPATIENT)
Dept: FAMILY MEDICINE | Facility: CLINIC | Age: 81
End: 2021-03-24

## 2021-03-24 DIAGNOSIS — I10 ESSENTIAL HYPERTENSION: ICD-10-CM

## 2021-03-24 DIAGNOSIS — I49.3 FREQUENT UNIFOCAL PREMATURE VENTRICULAR CONTRACTIONS: ICD-10-CM

## 2021-03-24 DIAGNOSIS — R20.2 PARESTHESIA OF BOTH FEET: ICD-10-CM

## 2021-03-24 DIAGNOSIS — Z85.3 HISTORY OF BREAST CANCER: ICD-10-CM

## 2021-03-24 DIAGNOSIS — Z00.00 MEDICARE ANNUAL WELLNESS VISIT, SUBSEQUENT: ICD-10-CM

## 2021-03-24 DIAGNOSIS — R00.1 SINUS BRADYCARDIA: ICD-10-CM

## 2021-03-24 DIAGNOSIS — E78.00 PURE HYPERCHOLESTEROLEMIA: ICD-10-CM

## 2021-03-24 DIAGNOSIS — E55.9 VITAMIN D DEFICIENCY: ICD-10-CM

## 2021-03-24 DIAGNOSIS — N18.31 STAGE 3A CHRONIC KIDNEY DISEASE (H): ICD-10-CM

## 2021-03-24 DIAGNOSIS — Z11.59 ENCOUNTER FOR HEPATITIS C SCREENING TEST FOR LOW RISK PATIENT: ICD-10-CM

## 2021-03-24 DIAGNOSIS — E03.9 HYPOTHYROIDISM, UNSPECIFIED TYPE: ICD-10-CM

## 2021-03-24 LAB
ALBUMIN SERPL-MCNC: 4.2 G/DL (ref 3.5–5)
ALP SERPL-CCNC: 80 U/L (ref 45–120)
ALT SERPL W P-5'-P-CCNC: 19 U/L (ref 0–45)
ANION GAP SERPL CALCULATED.3IONS-SCNC: 11 MMOL/L (ref 5–18)
AST SERPL W P-5'-P-CCNC: 24 U/L (ref 0–40)
BILIRUB SERPL-MCNC: 0.6 MG/DL (ref 0–1)
BUN SERPL-MCNC: 18 MG/DL (ref 8–28)
CALCIUM SERPL-MCNC: 9.7 MG/DL (ref 8.5–10.5)
CHLORIDE BLD-SCNC: 105 MMOL/L (ref 98–107)
CHOLEST SERPL-MCNC: 313 MG/DL
CO2 SERPL-SCNC: 25 MMOL/L (ref 22–31)
CREAT SERPL-MCNC: 1.07 MG/DL (ref 0.6–1.1)
ERYTHROCYTE [DISTWIDTH] IN BLOOD BY AUTOMATED COUNT: 13.1 % (ref 11–14.5)
FASTING STATUS PATIENT QL REPORTED: YES
GFR SERPL CREATININE-BSD FRML MDRD: 50 ML/MIN/1.73M2
GLUCOSE BLD-MCNC: 93 MG/DL (ref 70–125)
HCT VFR BLD AUTO: 42.5 % (ref 35–47)
HDLC SERPL-MCNC: 87 MG/DL
HGB BLD-MCNC: 13.7 G/DL (ref 12–16)
LDLC SERPL CALC-MCNC: 205 MG/DL
MCH RBC QN AUTO: 29.5 PG (ref 27–34)
MCHC RBC AUTO-ENTMCNC: 32.2 G/DL (ref 32–36)
MCV RBC AUTO: 91 FL (ref 80–100)
PLATELET # BLD AUTO: 224 THOU/UL (ref 140–440)
PMV BLD AUTO: 9.4 FL (ref 7–10)
POTASSIUM BLD-SCNC: 4.6 MMOL/L (ref 3.5–5)
PROT SERPL-MCNC: 6.7 G/DL (ref 6–8)
PTH-INTACT SERPL-MCNC: 96 PG/ML (ref 10–86)
RBC # BLD AUTO: 4.65 MILL/UL (ref 3.8–5.4)
SODIUM SERPL-SCNC: 141 MMOL/L (ref 136–145)
TRIGL SERPL-MCNC: 104 MG/DL
TSH SERPL DL<=0.005 MIU/L-ACNC: 3.15 UIU/ML (ref 0.3–5)
VIT B12 SERPL-MCNC: 522 PG/ML (ref 213–816)
WBC: 3.8 THOU/UL (ref 4–11)

## 2021-03-24 ASSESSMENT — MIFFLIN-ST. JEOR: SCORE: 1182.82

## 2021-03-25 LAB
25(OH)D3 SERPL-MCNC: 38.6 NG/ML (ref 30–80)
25(OH)D3 SERPL-MCNC: 38.6 NG/ML (ref 30–80)
HCV AB SERPL QL IA: NEGATIVE

## 2021-03-30 ENCOUNTER — AMBULATORY - HEALTHEAST (OUTPATIENT)
Dept: FAMILY MEDICINE | Facility: CLINIC | Age: 81
End: 2021-03-30

## 2021-03-30 ENCOUNTER — COMMUNICATION - HEALTHEAST (OUTPATIENT)
Dept: FAMILY MEDICINE | Facility: CLINIC | Age: 81
End: 2021-03-30

## 2021-03-30 DIAGNOSIS — E55.9 VITAMIN D DEFICIENCY: ICD-10-CM

## 2021-04-20 ENCOUNTER — COMMUNICATION - HEALTHEAST (OUTPATIENT)
Dept: FAMILY MEDICINE | Facility: CLINIC | Age: 81
End: 2021-04-20

## 2021-04-20 DIAGNOSIS — Z85.3 HISTORY OF BREAST CANCER: ICD-10-CM

## 2021-04-20 DIAGNOSIS — Z90.11 HISTORY OF RIGHT MASTECTOMY: ICD-10-CM

## 2021-05-24 ENCOUNTER — OFFICE VISIT - HEALTHEAST (OUTPATIENT)
Dept: PODIATRY | Facility: CLINIC | Age: 81
End: 2021-05-24

## 2021-05-24 ENCOUNTER — SURGERY - HEALTHEAST (OUTPATIENT)
Dept: PODIATRY | Facility: CLINIC | Age: 81
End: 2021-05-24

## 2021-05-24 DIAGNOSIS — M20.41 HAMMER TOE OF RIGHT FOOT: ICD-10-CM

## 2021-05-24 DIAGNOSIS — I78.1 SPIDER VEIN, SYMPTOMATIC: ICD-10-CM

## 2021-05-24 DIAGNOSIS — M20.41 HAMMERTOE OF RIGHT FOOT: ICD-10-CM

## 2021-05-24 ASSESSMENT — MIFFLIN-ST. JEOR: SCORE: 1190.76

## 2021-05-25 ENCOUNTER — COMMUNICATION - HEALTHEAST (OUTPATIENT)
Dept: PODIATRY | Facility: CLINIC | Age: 81
End: 2021-05-25

## 2021-05-25 ENCOUNTER — RECORDS - HEALTHEAST (OUTPATIENT)
Dept: ADMINISTRATIVE | Facility: CLINIC | Age: 81
End: 2021-05-25

## 2021-05-27 ENCOUNTER — RECORDS - HEALTHEAST (OUTPATIENT)
Dept: ADMINISTRATIVE | Facility: CLINIC | Age: 81
End: 2021-05-27

## 2021-05-27 NOTE — PROGRESS NOTES
"[facility] Daily Progress   Preferred name :Irma  Patient Name: Alexandra Menezes  Date: 4/15/2019  Visit #: 2/8-10  PTA visit #:  -    Date of evaluation: 4/10//2019  Referral Diagnosis: Orthopedic aftercare for joint replacement  Referring provider: Shadi Michel PA-[facility] Diagnosis:     ICD-10-CM    1. Chronic pain of left knee M25.562     G89.29    2. Abnormality of gait R26.9          Assessment:     Left knee ROM flexion 125o.    Patient demonstrates understanding/independence with home program.  Patient is benefitting from skilled physical therapy and is making steady progress toward functional goals.  Patient is appropriate to continue with skilled physical therapy intervention, as indicated by initial plan of care.    Goal Status:  Pt. will demonstrate/verbalize independence in self-management of condition in : 4 weeks  Pt. will be able to walk : 30 minutes;with FWB;on even surfaces;with less pain;for household mobility;for community mobility;in 4 weeks;Progressing toward  Patient will ascend / descend: stairs;with recipricol gait;without assistive device;independently;with less pain;in 4 weeks;Progressing toward    No data recorded    Plan / Patient Education:     Continue with initial plan of care.  Progress with home program as tolerated.lower extremities strengthening/stretching, upgrade HEP.    Subjective:     Pain Ratin  \"the tape feels good\".      Objective:     Left knee seated: flexion 125o  Surgical strips in place.  Skin intact, good tolerance to kinesio tape    Treatment Today     TREATMENT MINUTES COMMENTS   Evaluation     Self-care/ Home management     Manual therapy 15 STM: left knee edema evacuation massage.   Neuromuscular Re-education  Kinsiotape: removed with water and re -applied 2 \"I's\" to left knee   Therapeutic Activity     Therapeutic Exercises 45 Exercises:  Exercise #1: supine: ankle pumps, heel slides, TKE, hip abd/add, SLR x 10 repetitions each.  Comment #1: " standing holding onto paralellel bars: marching, hip extension/abduction, mini squats, knee flexion, stepping up/down with left leg x 20 repetions, minimal fatiguee.  Exercise #2: gait: without SEC in front of mirror, antalgic , able to correct with verbal cues.  Comment #2: nu step: seat:7, level:1, arms 10, x 5 minutes, good tolerance       Gait training     Modality__________________                Total 60    Blank areas are intentional and mean the treatment did not include these items.       Argentina Pereyra PT  4/15/2019

## 2021-05-27 NOTE — TELEPHONE ENCOUNTER
No new interventions.  Check blood pressure again at next visit and also comment on level of pain as I am curious to know if pain is contributing to her elevated blood pressures.  If blood pressure at that time remains >140/>90, please have her schedule follow-up visit in clinic with me.  Thanks!  GIO

## 2021-05-27 NOTE — PROGRESS NOTES
"[facility] Daily Progress   Preferred name :Irma  Patient Name: Alexandra Menezes  Date: 2019  Visit #: 3/8-10  PTA visit #:  -    Date of evaluation: 4/10//2019  Referral Diagnosis: Orthopedic aftercare for joint replacement  Referring provider: Shadi Michel PA-[facility] Diagnosis:     ICD-10-CM    1. Chronic pain of left knee M25.562     G89.29    2. Abnormality of gait R26.9          Assessment:     Decreased left knee edema, left knee ROM flexion 125o.  Gait with normal enetu.    Patient demonstrates understanding/independence with home program.  Patient is benefitting from skilled physical therapy and is making steady progress toward functional goals.  Patient is appropriate to continue with skilled physical therapy intervention, as indicated by initial plan of care.    Goal Status:  Pt. will demonstrate/verbalize independence in self-management of condition in : 4 weeks  Pt. will be able to walk : 30 minutes;with FWB;on even surfaces;with less pain;for household mobility;for community mobility;in 4 weeks;Progressing toward  Patient will ascend / descend: stairs;with recipricol gait;without assistive device;independently;with less pain;in 4 weeks;Progressing toward        Plan / Patient Education:     Continue with initial plan of care.  Progress with home program as tolerated.lower extremities strengthening/stretching, upgrade HEP.    Subjective:     Pain Ratin  \"my knee is better\".      Objective:     Left knee seated: flexion 125o  Surgical strips in place.    Skin intact, good tolerance to kinesio tape    Treatment Today     TREATMENT MINUTES COMMENTS   Evaluation     Self-care/ Home management     Manual therapy 15 STM: left knee edema evacuation massage.   Neuromuscular Re-education  Kinsiotape: removed with water and re -applied 2 \"I's\" to left knee   Therapeutic Activity     Therapeutic Exercises 45 Exercises:  Exercise #1: supine: ankle pumps, heel slides, TKE, hip abd/add, " SLR x 10 repetitions each.  Comment #1: standing holding onto paralellel bars: marching, hip extension/abduction, mini squats, knee flexion, stepping up/down with left leg x 20 repetions, minimal fatiguee.  Exercise #2: gait: without SEC in front of mirror, antalgic , able to correct with verbal cues.  Comment #2: nu step: seat: 8, level:1, arms 10, x 10 minutes, good tolerance       Gait training     Modality__________________                Total 60    Blank areas are intentional and mean the treatment did not include these items.       Argentina Pereyra PT  4/18/2019

## 2021-05-27 NOTE — TELEPHONE ENCOUNTER
BP during PT yesterday, reported to Ortho MD who is signing home care orders.   165/90  Pt was asymptomatic t/o session.  But reported feeling frustrated about her computer prior to PT visit, and was anxious about knee exercises hurting today.    Re-checked BP after ambulation and ROM exercises, and was the same.    Please reply to this message if any comments, questions or concerns.    Thank you,  Lisa Costa, PT, DPT

## 2021-05-27 NOTE — PROGRESS NOTES
TCM DISCHARGE FOLLOW UP CALL    Discharge Date:  3/23/2019  Reason for hospital stay (discharge diagnosis)::  (L) TKA  Are you feeling better, the same or worse since your discharge?:  Patient is feeling better (Pain controlled with Tylenol. CMS intact. No constipation. Denies dizziness.Amb with walker . doing HEP.)  Do you feel like you have a plan in the event of a health emergency?: Yes (She calls her son who is a physician or calls the clinic)    As part of your discharge plan, were  home care services ordered for you?: Yes    Have you seen them yet, or are they scheduled to visit?: Yes    Do you have any follow up visits scheduled with your PCP or Specialist?:  Yes, with PCP (4/1)  (RN) Is PCP appt scheduled soon enough (within 14 days of discharge date)?: Yes

## 2021-05-27 NOTE — PROGRESS NOTES
[facility] Certification Request    April 11, 2019      Patient: Alexandra Menezes  MR Number: [id number]  YOB: 1940  Date of Visit: 4/10/2019      Dear Dr. Michel, Shadi KEE PA-C:    Thank you for this referral.   We are seeing Alexandra Menezes for Physical Therapy of left knee weakness.    Medicare and/or Medicaid requires physician review and approval of the treatment plan. Please review the plan of care and verify that you agree with the therapy plan of care by co-signing this note.      Plan of Care  Authorization / Certification Start Date: 04/10/19  Authorization / Certification End Date: 05/11/19  Authorization / Certification Number of Visits: 10  Communication with: Referral Source  Patient Related Instruction: Nature of Condition;Treatment plan and rationale;Self Care instruction;Basis of treatment;Body mechanics;Posture;Precautions;Next steps;Expected outcome  Times per Week: 2  Number of Weeks: 4  Number of Visits: 8-10  Discharge Planning: patient will be discharge to self care when goals met.   Therapeutic Exercise: ROM;Stretching;Strengthening  Neuromuscular Reeducation: kinesio tape  Manual Therapy: other  Manual Therapy: edema evacuation massage.  Gait Training: progressive gaitt training, stairs 10 up/down with alternating steps independently.      Goals:  Pt. will demonstrate/verbalize independence in self-management of condition in : 4 weeks  Pt. will be able to walk : 30 minutes;with FWB;on even surfaces;with less pain;for household mobility;for community mobility;in 4 weeks  Patient will ascend / descend: stairs;with recipricol gait;without assistive device;independently;with less pain;in 4 weeks      If you have any questions or concerns, please don't hesitate to call.    Sincerely,      Argentina Pereyra, PT, CLJOLANTA[facility]JULISSA        Physician recommendation:     ___ Follow therapist's recommendation        ___ Modify therapy      *Physician co-signature indicates they  certify the need for these services furnished within this plan and while under their care.        [facility]   Knee Initial Evaluation  Preffered name: Irma  Patient Name: Alexandra Menezes  Date of evaluation: 4/10//2019  Referral Diagnosis: Orthopedic aftercare for joint replacement  Referring provider: Shadi Michel PA-C  Visit Diagnosis:     ICD-10-CM    1. Chronic pain of left knee M25.562     G89.29    2. Abnormality of gait R26.9           Assessment:     Alexandra Menezes is a 78 y.o. female who presents to therapy today with chief complaints of left knee. Onset date of sx was gradual started march 2014 and had  Left TKR 3-21-19.  Pt reported h/o gradual deterioration of knee.  Pain symptoms are 8-1/10.  Functional impairments include walk, get in/out of bed/chair/car, sleeping,up/down steps, walk, kneel /squat..  Pt demo's signs and sx consistent with left knee weakness, decreased left knee ROM.     Impairments in  pain, ROM, joint mobility  Patient's signs and symptoms are consistent with left knee weakness, decreased ROM.  Patient responded well to exercises..  Prognosis to achieve goals is  good   Pt. is appropriate for skilled PT intervention as outlined in the Plan of Care (POC).    Goals:  Pt. will demonstrate/verbalize independence in self-management of condition in : 4 weeks  Pt. will be able to walk : 30 minutes;with FWB;on even surfaces;with less pain;for household mobility;for community mobility;in 4 weeks  Patient will ascend / descend: stairs;with recipricol gait;without assistive device;independently;with less pain;in 4 weeks    Patient's expectations/goals are realistic.    Barriers to Learning or Achieving Goals:  No Barriers.       Plan / Patient Instructions:      Plan of Care:   Authorization / Certification Start Date: 04/10/19  Authorization / Certification End Date: 05/11/19  Authorization / Certification Number of Visits: 10  Communication with: Referral  Source  Patient Related Instruction: Nature of Condition;Treatment plan and rationale;Self Care instruction;Basis of treatment;Body mechanics;Posture;Precautions;Next steps;Expected outcome  Times per Week: 2  Number of Weeks: 4  Number of Visits: 8-10  Discharge Planning: patient will be discharge to self care when goals met.   Therapeutic Exercise: ROM;Stretching;Strengthening  Neuromuscular Reeducation: kinesio tape  Manual Therapy: other  Manual Therapy: edema evacuation massage.  Gait Training: progressive gaitt training, stairs 10 up/down with alternating steps independently.      Plan for next visit: kinesio tape, gait training, stairs, post TKR rehab exercises, upgrade HEP.     Subjective:          History of Present Illness:    Alexandra is a 78 y.o. female who presents to therapy today with complaints of left knee weakness. Date of onset/duration of symptoms is 3--19 s/p L TKR. Onset was gradual. Symptoms are intermittent. She reports  an episodic  history of similar symptoms. She describes their previous level of function as limited with ambualation.    Pain Ratin  Pain rating at best: 0  Pain rating at worst: 8  Pain description:aching    Functional limitations are described as occurring with:   ascending and descending stairs or curbs  bending  transitional movements getting in  bed, chair and car and getting out of  bed, chair and car  walking , kneel /squat.         Objective:      Note: Items left blank indicates the item was not performed or not indicated at the time of the evaluation.    Patient Outcome Measures :    Lower Extremity Functional Scale (_/80): 42/80     Scores range from 0-80, where a score of 80 represents maximum function. The minimal clinically important difference is a positive change of 9 points.    Knee Examination  1. Chronic pain of left knee     2. Abnormality of gait       No data recorded   Involved Side: Left  Posture Observation:      General sitting posture is   fair.  General standing posture is fair.  Assistive Device: SEC  Gait Observation: decreased left step length, needed verbal cues for gait sequence using SEC.  Lumbar Clearing: Does not provoke symptoms  Hip Clearing: Does not provoke symptoms  Left knee bakers cyst  Knee ROM                  Date:  04/11/19  seated    AROM in degrees  Right   Left  Right   Left  Right   Left       Knee Flexion  (130 )      102o                   Knee Extension  (0 )      -5o                 PROM in degrees  Right   Left  Right   Left  Right   Left       Knee Flexion  (130 )                         Knee Extension  (0 )                       LE Strength                             Date:  04/11/19    Strength (MMT/5)  Right   Left  Right   Left  Right   Left       Hip Flexion                         Hip Abduction                         Hip Adduction                         Hip Extension                         Hip Internal Rotation                         Hip External Rotation                         Knee Extension   5   -4                   Knee Flexion   5   5                   Ankle Dorsiflexion                         Ankle Plantarflexion                       Flexibility & Palpation:  Left knee ROM limited due to minimal edema from knee surgery, derma-stripc arein place, skin is with normal healing and color is normal, no warmth, review the signs/symptoms with patient and she was able to verbalize them.          Treatment Today     Therapeutic Exercises:  Exercise #1: supine: ankle pumps, heel slides, TKE, hip abd/add, SLR x 10 repetitions each.  Comment #1: standing holding onto paralellel bars: marching, hip extension/abduction, mini squats, knee flexion, stepping up/down with left leg x 10 repetions, minimal fatiguee.  Exercise #2: gait: without SEC in front of mirror, antalgic , able to correct with verbal cues.             TREATMENT MINUTES COMMENTS   Evaluation 30 low   Self-care/ Home management     Manual therapy      Neuromuscular Re-education     Therapeutic Activity     Therapeutic Exercises 14 Exercises:  Exercise #1: supine: ankle pumps, heel slides, TKE, hip abd/add, SLR x 10 repetitions each.  Comment #1: standing holding onto paralellel bars: marching, hip extension/abduction, mini squats, knee flexion, stepping up/down with left leg x 10 repetions, minimal fatiguee.  Exercise #2: gait: without SEC in front of mirror, antalgic , able to correct with verbal cues.       Gait training 14 Instructed on proper use of SEC and adjusted david.   Modality__________________                Total 58    Blank areas are intentional and mean the treatment did not include these items.     PT Evaluation Code: (Please list factors)  Patient History/Comorbidities: knee pain.  Examination: knee pain.  Clinical Presentation: stable.  Clinical Decision Making: low.    Patient History/  Comorbidities Examination  (body structures and functions, activity limitations, and/or participation restrictions) Clinical Presentation Clinical Decision Making (Complexity)   No documented Comorbidities or personal factors 1-2 Elements Stable and/or uncomplicated Low   1-2 documented comorbidities or personal factor 3 Elements Evolving clinical presentation with changing characteristics Moderate   3-4 documented comorbidities or personal factors 4 or more Unstable and unpredictable High              Argentina Pereyra, PT  4/11/2019  9:33 AM

## 2021-05-28 NOTE — PROGRESS NOTES
[facility] Daily Progress -Discharge  Preferred name :Irma  Patient Name: Alexandra Menezes  Date: 4/25/2019  Visit #: 5/8-10  PTA visit #:  -    Date of evaluation: 4/10//2019  Referral Diagnosis: Orthopedic aftercare for joint replacement  Referring provider: Shadi Michel PA-[facility] Diagnosis:     ICD-10-CM    1. Chronic pain of left knee M25.562     G89.29    2. Abnormality of gait R26.9          Assessment:       [facility] Discharge Summary  Patient Name: Alexandra Menezes  Date: 4/25/2019  Referral Diagnosis: Orthopedic aftercare for joint replacement    Referring provider: Shadi Michel PA-C  Visit Diagnosis:   1. Chronic pain of left knee     2. Abnormality of gait         Goals:  Pt. will demonstrate/verbalize independence in self-management of condition in : 4 weeks;Met  Pt. will be able to walk : 30 minutes;with FWB;on even surfaces;with less pain;for household mobility;for community mobility;in 4 weeks;Met  Patient will ascend / descend: stairs;with recipricol gait;without assistive device;independently;with less pain;in 4 weeks;Met      Patient was seen for 05 visits from 4-10-19 to 4-25-19 with 0 missed appointments.    Lower Extremity Functional Scale (_/80): 57/80    Patient left knee ROM is WFL. Patient is independent with ambualtion.  The patient met goals and has demonstrated understanding of and independence in the home program for self-care, and progression to next steps.  She will initiate contact if questions or concerns arise.  The patient was instructed to follow up with physician's clinic.  Patient received a home program and was instructed to continue with community ambulation.    Therapy will be discontinued at this time.  The patient will need a new referral to resume.    Thank you for your referral.  Argentina Pereyra PT  4/25/2019  9:59 AM      Left knee ROM is WFL, no edema.  Surgical tape is in place with borders getting loose.    Patient is  "independent with ambulation with reciprocal gait,good neetu, no assistive device.    Goal Status:  Pt. will demonstrate/verbalize independence in self-management of condition in : 4 weeks;Met  Pt. will be able to walk : 30 minutes;with FWB;on even surfaces;with less pain;for household mobility;for community mobility;in 4 weeks;Met  Patient will ascend / descend: stairs;with recipricol gait;without assistive device;independently;with less pain;in 4 weeks;Met        Plan / Patient Education:     Discharge from physical therapy all goals met.    Subjective:     Pain Ratin  \"my knee feels good\".      Objective:     Left knee seated: flexion 125o  Left knee extension supine: -1o ( ba are getting loosekers cyst on popliteal area).  Surgical strips in place.    Skin intact, good tolerance to kinesio tape    Treatment Today     TREATMENT MINUTES COMMENTS   Evaluation     Self-care/ Home management     Manual therapy 14 STM: left knee edema evacuation massage, skin intact.   Neuromuscular Re-education     Therapeutic Activity     Therapeutic Exercises 45 Exercises:  Exercise #1: supine: ankle pumps, heel slides, TKE, hip abd/add, SLR x 10 repetitions each.  Comment #1: standing holding onto paralellel bars: marching, hip extension/abduction, mini squats, knee flexion, stepping up/down with left leg x 20 repetions, minimal fatiguee.  Exercise #2: gait sequence with normal neetu, independent, no assistive device.  Comment #2: nu step: seat: 8, level:2 , arms 10, x 15 minutes, good tolerance  Exercise #3: one leg standing, alternating, x 10 repetitions, no LOB.  Comment #3: lower extremity closed chain x 5 repetitions each, provided written instructions.       Gait training     Modality__________________                Total 59    Blank areas are intentional and mean the treatment did not include these items.       Argentina Pereyra PT  2019    "

## 2021-05-28 NOTE — PROGRESS NOTES
"[facility] Daily Progress   Preferred name :Irma  Patient Name: Alexandra Menezes  Date: 2019  Visit #: 4/8-10  PTA visit #:  -    Date of evaluation: 4/10//2019  Referral Diagnosis: Orthopedic aftercare for joint replacement  Referring provider: Shadi Michel PA-[facility] Diagnosis:     ICD-10-CM    1. Chronic pain of left knee M25.562     G89.29    2. Abnormality of gait R26.9          Assessment:       Patient was able to progress to ambulate without cane.  Left knee ROM is WFL, no edema.  Surgical tape is in place with borders getting loose.    Patient demonstrates understanding/independence with home program.  Patient is benefitting from skilled physical therapy and is making steady progress toward functional goals.  Patient is appropriate to continue with skilled physical therapy intervention, as indicated by initial plan of care.    Goal Status:  Pt. will demonstrate/verbalize independence in self-management of condition in : 4 weeks  Pt. will be able to walk : 30 minutes;with FWB;on even surfaces;with less pain;for household mobility;for community mobility;in 4 weeks;Progressing toward  Patient will ascend / descend: stairs;with recipricol gait;without assistive device;independently;with less pain;in 4 weeks;Progressing toward        Plan / Patient Education:     Continue with initial plan of care.  Progress with home program as tolerated.lower extremities strengthening/stretching, upgrade HEP.probably the next visit will be the last.    Subjective:     Pain Ratin  \"I can walk without a cane\".      Objective:     Left knee seated: flexion 125o  Left knee extension supine: -2o  Surgical strips in place.    Skin intact, good tolerance to kinesio tape    Treatment Today     TREATMENT MINUTES COMMENTS   Evaluation     Self-care/ Home management     Manual therapy 14 STM: left knee edema evacuation massage.removed kinesio tape with water., skin intact.   Neuromuscular Re-education   "   Therapeutic Activity     Therapeutic Exercises 45 Exercises:  Exercise #1: supine: ankle pumps, heel slides, TKE, hip abd/add, SLR x 10 repetitions each.  Comment #1: standing holding onto paralellel bars: marching, hip extension/abduction, mini squats, knee flexion, stepping up/down with left leg x 20 repetions, minimal fatiguee.  Exercise #2: gait: without SEC in front of mirror, antalgic , able to correct with verbal cues.  Comment #2: nu step: seat: 8, level:2 , arms 10, x 10 minutes, good tolerance  Exercise #3: one leg standing, alternating, x 10 repetitions, no LOB.       Gait training     Modality__________________                Total 59    Blank areas are intentional and mean the treatment did not include these items.       Argentina Pereyra PT  4/22/2019

## 2021-05-28 NOTE — PROGRESS NOTES
Assessment/Plan:    1. Right ear pain  Ear exam is normal today.  We discussed the possibility of TMJ etiology however no significant pain with jaw movement, chewing etc.  Given relatively mild and intermittent symptoms, I suggest that we continue to monitor over the next week or so.  If patient develops any new or worsening symptoms, she should notify clinic.  Will consider further evaluation and referral to ENT in the event of persisting or worsening symptoms.  She is comfortable with this plan.    Subjective:    Alexandra Menezes is a 78 year old female seen today for evaluation of right ear pain.  Pain developed yesterday morning.  It occurs very intermittently, once every few hours.  Symptoms seemed to be worse last night.  When she woke up this morning she felt that symptoms have improved however, she just recently noticed sharp ear pain again.  She wants to make sure she does have an ear infection.  She denies any change in hearing.  No ear drainage.  She recalls having an ear infection a little over a year ago otherwise no significant history of ear infections, ear surgeries etc.  She denies any fevers, chills.  No recent head colds or travel on an airplane.  She denies any plugged sensation or increased pressure.  She carefully used a Q-tip yesterday and recalls having some wax.  Otherwise no new findings.  She is not having any pain in her left ear.  Pain is located just below right ear.  It does not worsen with chewing or movement of her jaw.  She does not have any additional concerns today. Review of systems is as stated in HPI, and the remainder of the 10 system review is otherwise unremarkable.    Past Medical History, Family History, and Social History reviewed.    Past Surgical History:   Procedure Laterality Date     BREAST SURGERY Right 1992    Mastectomy for Ca     CATARACT EXTRACTION W/  INTRAOCULAR LENS IMPLANT Bilateral      MASTECTOMY Right 1992     REPLACEMENT TOTAL KNEE Left 3/21/2019     Procedure: LEFT TOTAL KNEE ARTHROPLASTY,COMPUTER-ASSIST;  Surgeon: Fede Robert MD;  Location: [facility] Main OR;  Service: Orthopedics        Family History   Problem Relation Age of Onset     Rheum arthritis Mother      Heart disease Brother      CABG Brother      Heart disease Maternal Grandmother      Valvular heart disease Father      Hyperthyroidism Sister      Heart disease Sister      Clotting disorder Neg Hx         Past Medical History:   Diagnosis Date     Arrhythmia     take propafenone     Arthritis      Breast cancer (H) 1992    right Mastectomy     CKD (chronic kidney disease) stage 3, GFR 30-59 ml/min (H) 3/21/2019     Disease of thyroid gland     hypothyroid     Hypercholesterolemia      Hypertension      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  0     betamethasone dipropionate (DIPROLENE) 0.05 % cream Apply to affected area sparingly twice daily (Patient taking differently: As needed) 45 g 0     calcium carbonate-vitamin D3 (CALCIUM 600 + D,3,) 600 mg(1,500mg) -400 unit per tablet Take 1 tablet by mouth 2 (two) times a day.       cholecalciferol, vitamin D3, 1,000 unit tablet Take 1,000 Units by mouth daily.       imiquimod (ALDARA) 5 % cream Apply 1 application topically 2 (two) times a day.       latanoprost (XALATAN) 0.005 % ophthalmic solution INSTILL 1 DROP INTO BOTH EYES ONCE DAILY IN THE EVENING  3     levothyroxine (SYNTHROID, LEVOTHROID) 25 MCG tablet TAKE 1 TABLET BY MOUTH DAILY AT 6  "AM 90 tablet 2     lisinopril (PRINIVIL,ZESTRIL) 10 MG tablet TAKE 1 TABLET BY MOUTH DAILY 90 tablet 2     mupirocin (BACTROBAN) 2 % ointment Apply 1 application topically 3 (three) times a day as needed.       omega-3 fatty acids-fish oil (OMEGA 3 FISH OIL) 684-1,200 mg CpDR Take 2 tablets by mouth every morning.       propafenone (RYTHMOL) 225 MG tablet TAKE 1 TABLET BY MOUTH 3 TIMES A DAY. 270 tablet 2     magnesium oxide (MAG-OX) 400 mg (241.3 mg magnesium) tablet Take 1 tablet (400 mg total) by mouth 2 (two) times a day.  0     oxyCODONE (ROXICODONE) 5 MG immediate release tablet Take 1-2 tablets (5-10 mg total) by mouth every 4 (four) hours as needed. 42 tablet 0     senna-docusate (PERICOLACE) 8.6-50 mg tablet Take 1 tablet by mouth 2 (two) times a day as needed for constipation.  0     No current facility-administered medications for this visit.           Objective:    Vitals:    04/30/19 1043   BP: 120/60   Patient Site: Left Arm   Patient Position: Sitting   Cuff Size: Adult Regular   Pulse: 60   Temp: 97.8  F (36.6  C)   Weight: 158 lb 6.4 oz (71.8 kg)   Height: 5' 4\" (1.626 m)      Body mass index is 27.19 kg/m .      General Appearance:  Alert, afebrile, cooperative, no distress, appears stated age   HEENT:  Right tympanic membrane is normal without injection or bulging.  Left TM is normal.  Bilateral ear canals normal without drainage or cerumen.  No pain with palpation of temporomandibular joint.  No other acute findings on HEENT exam.   Neck: Supple, symmetrical, no adenopathy.   Lungs:   Clear to auscultation bilaterally, respirations unlabored.    Heart:  Regular rate and rhythm, S1, S2 normal.   Skin: Warm, dry. No rashes or lesions   Neurologic:  Grossly intact.       This note has been dictated using voice recognition software. Any grammatical or context distortions are unintentional and inherent to the use of this software.     "

## 2021-05-29 ENCOUNTER — RECORDS - HEALTHEAST (OUTPATIENT)
Dept: ADMINISTRATIVE | Facility: CLINIC | Age: 81
End: 2021-05-29

## 2021-05-30 ENCOUNTER — RECORDS - HEALTHEAST (OUTPATIENT)
Dept: ADMINISTRATIVE | Facility: CLINIC | Age: 81
End: 2021-05-30

## 2021-05-30 VITALS — HEIGHT: 64 IN | BODY MASS INDEX: 27.66 KG/M2 | WEIGHT: 162 LBS

## 2021-05-31 VITALS — HEIGHT: 64 IN | WEIGHT: 155.6 LBS | BODY MASS INDEX: 26.56 KG/M2

## 2021-05-31 VITALS — BODY MASS INDEX: 27.07 KG/M2 | WEIGHT: 157.7 LBS

## 2021-05-31 VITALS — BODY MASS INDEX: 26.12 KG/M2 | WEIGHT: 153 LBS | HEIGHT: 64 IN

## 2021-06-01 VITALS — WEIGHT: 152.4 LBS | BODY MASS INDEX: 26.02 KG/M2 | HEIGHT: 64 IN

## 2021-06-01 VITALS — BODY MASS INDEX: 26.61 KG/M2 | WEIGHT: 155 LBS

## 2021-06-01 VITALS — BODY MASS INDEX: 26.46 KG/M2 | HEIGHT: 64 IN | WEIGHT: 155 LBS

## 2021-06-01 VITALS — HEIGHT: 64 IN | BODY MASS INDEX: 26.53 KG/M2 | WEIGHT: 155.4 LBS

## 2021-06-01 NOTE — TELEPHONE ENCOUNTER
Refill Approved    Rx renewed per Medication Renewal Policy. Medication was last renewed on 9/25/18.    Marisol Chou, [facility] Connection Triage/Med Refill 9/11/2019     Requested Prescriptions   Pending Prescriptions Disp Refills     levothyroxine (SYNTHROID, LEVOTHROID) 25 MCG tablet [Pharmacy Med Name: LEVOTHYROXINE 25 MCG TABLET] 90 tablet 2     Sig: TAKE 1 TABLET BY MOUTH DAILY AT 6 AM       Thyroid Hormones Protocol Passed - 9/11/2019  1:48 AM        Passed - Provider visit in past 12 months or next 3 months     Last office visit with prescriber/PCP: Visit date not found OR same dept: 4/30/2019 Brea Braswell CNP OR same specialty: 4/30/2019 Brea Braswell CNP  Last physical: 8/22/2018 Last MTM visit: Visit date not found   Next visit within 3 mo: Visit date not found  Next physical within 3 mo: Visit date not found  Prescriber OR PCP: Donna Buchanan MD  Last diagnosis associated with med order: 1. Hypothyroidism  - levothyroxine (SYNTHROID, LEVOTHROID) 25 MCG tablet [Pharmacy Med Name: LEVOTHYROXINE 25 MCG TABLET]; TAKE 1 TABLET BY MOUTH DAILY AT 6 AM  Dispense: 90 tablet; Refill: 2    If protocol passes may refill for 12 months if within 3 months of last provider visit (or a total of 15 months).             Passed - TSH on file in past 12 months for patient age 12 & older     TSH   Date Value Ref Range Status   03/08/2019 3.44 0.30 - 5.00 uIU/mL Final

## 2021-06-01 NOTE — PATIENT INSTRUCTIONS - HE
Alexandra Menezes    It was a pleasure to see you at [facility] today.    Decrease propafenone to 1 pill twice daily  Holter monitor in 1 week  Can increase propafenone to 1 pill 3 times daily if symptoms recur  Follow up appointment:   Dr. Smith in 1 year    Contact Mee at [phone] with questions or concerns.    Aakash Smith MD

## 2021-06-02 ENCOUNTER — RECORDS - HEALTHEAST (OUTPATIENT)
Dept: ADMINISTRATIVE | Facility: CLINIC | Age: 81
End: 2021-06-02

## 2021-06-02 VITALS — HEIGHT: 64 IN | BODY MASS INDEX: 26.79 KG/M2 | WEIGHT: 156.9 LBS

## 2021-06-02 VITALS — BODY MASS INDEX: 26.19 KG/M2 | WEIGHT: 153.4 LBS | HEIGHT: 64 IN

## 2021-06-02 VITALS — WEIGHT: 158.4 LBS | HEIGHT: 64 IN | BODY MASS INDEX: 27.04 KG/M2

## 2021-06-02 VITALS — WEIGHT: 156 LBS | BODY MASS INDEX: 26.78 KG/M2

## 2021-06-03 VITALS
WEIGHT: 156.13 LBS | SYSTOLIC BLOOD PRESSURE: 116 MMHG | BODY MASS INDEX: 26.66 KG/M2 | HEART RATE: 54 BPM | HEIGHT: 64 IN | RESPIRATION RATE: 16 BRPM | DIASTOLIC BLOOD PRESSURE: 60 MMHG | OXYGEN SATURATION: 98 %

## 2021-06-04 VITALS
WEIGHT: 161 LBS | RESPIRATION RATE: 12 BRPM | DIASTOLIC BLOOD PRESSURE: 70 MMHG | HEART RATE: 66 BPM | HEIGHT: 64 IN | BODY MASS INDEX: 27.49 KG/M2 | SYSTOLIC BLOOD PRESSURE: 120 MMHG

## 2021-06-05 VITALS
HEIGHT: 64 IN | BODY MASS INDEX: 27.31 KG/M2 | HEART RATE: 62 BPM | DIASTOLIC BLOOD PRESSURE: 80 MMHG | OXYGEN SATURATION: 99 % | WEIGHT: 160 LBS | TEMPERATURE: 97.1 F | SYSTOLIC BLOOD PRESSURE: 132 MMHG

## 2021-06-07 NOTE — TELEPHONE ENCOUNTER
RN cannot approve Refill Request    RN can NOT refill this medication Protocol failed and NO refill given.      Anjali Almeida, [facility] Connection Triage/Med Refill 3/23/2020    Requested Prescriptions   Pending Prescriptions Disp Refills     levothyroxine (SYNTHROID, LEVOTHROID) 25 MCG tablet [Pharmacy Med Name: LEVOTHYROXINE 25 MCG TABLET] 90 tablet 3     Sig: TAKE 1 TABLET BY MOUTH DAILY AT 6 AM       Thyroid Hormones Protocol Failed - 3/21/2020 11:21 AM        Failed - TSH on file in past 12 months for patient age 12 & older     TSH   Date Value Ref Range Status   03/08/2019 3.44 0.30 - 5.00 uIU/mL Final                   Passed - Provider visit in past 12 months or next 3 months     Last office visit with prescriber/PCP: Visit date not found OR same dept: 4/30/2019 Brea Braswell CNP OR same specialty: 4/30/2019 Brea Braswell CNP  Last physical: 8/22/2018 Last MTM visit: Visit date not found   Next visit within 3 mo: Visit date not found  Next physical within 3 mo: Visit date not found  Prescriber OR PCP: Donna Buchanan MD  Last diagnosis associated with med order: 1. Hypothyroidism  - levothyroxine (SYNTHROID, LEVOTHROID) 25 MCG tablet [Pharmacy Med Name: LEVOTHYROXINE 25 MCG TABLET]; TAKE 1 TABLET BY MOUTH DAILY AT 6 AM  Dispense: 90 tablet; Refill: 1    If protocol passes may refill for 12 months if within 3 months of last provider visit (or a total of 15 months).

## 2021-06-09 ENCOUNTER — RECORDS - HEALTHEAST (OUTPATIENT)
Dept: ADMINISTRATIVE | Facility: CLINIC | Age: 81
End: 2021-06-09

## 2021-06-09 NOTE — PROGRESS NOTES
"Alexandra Menezes is a 79 y.o. female who is being evaluated via a billable telephone visit.      The patient has been notified of following:     \"This telephone visit will be conducted via a call between you and your physician/provider. We have found that certain health care needs can be provided without the need for a physical exam.  This service lets us provide the care you need with a short phone conversation.  If a prescription is necessary we can send it directly to your pharmacy.  If lab work is needed we can place an order for that and you can then stop by our lab to have the test done at a later time.    Telephone visits are billed at different rates depending on your insurance coverage. During this emergency period, for some insurers they may be billed the same as an in-person visit.  Please reach out to your insurance provider with any questions.    If during the course of the call the physician/provider feels a telephone visit is not appropriate, you will not be charged for this service.\"    Patient has given verbal consent to a Telephone visit? Yes    What phone number would you like to be contacted at? [phone]     Patient would like to receive their AVS by AVS Preference: Riki.    Additional provider notes: Alexandra is a 79-year-old female presenting today for a medication check visit.  The patient recently came in for lab work.  The patient has a history of hypothyroidism and is on 25 mcg of levothyroxine.  She also has a history hypertension for which she is on lisinopril 10 mg daily.  The patient has a history of frequent premature ventricular contractions and takes Rythmol as prescribed by her cardiologist.  She also has a history of hyperlipidemia and is not currently on any medication for this.  The patient's LDL is quite high but she states that her HDL is always quite good and so after some discussions including with her son who is a physician, they have concluded not to put her on a " cholesterol medication.  Lastly, she has a history of chronic kidney disease.    Assessment/Plan:  Problem List Items Addressed This Visit        [facility] Cardiac Problems    Hypercholesterolemia     LDL at 211 on recent draw; discussed treatment of high cholesterol, risk of cardiovascular disease, and consideration to proceed with a CT coronary calcium test to determine relative risk and need to treat. She would like to discuss with her son who is a physician.          Hypothyroidism - Primary     Recent TSH within range; refill provided today for her thyroid medication.          Relevant Medications    levothyroxine (SYNTHROID, LEVOTHROID) 25 MCG tablet    Essential hypertension     Takes Lisinopril 10mg daily            Other    CKD (chronic kidney disease) stage 3, GFR 30-59 ml/min (H) (Chronic)     Recent GFR of 44 which is relatively stable.            Other Visit Diagnoses     Encounter for screening mammogram for breast cancer        Relevant Orders    Mammo Screening Bilateral              Phone call duration:  11 minutes    Lynne Galindo MD

## 2021-06-09 NOTE — TELEPHONE ENCOUNTER
Left message to call back for: pt  Information to relay to patient:  Left message to call and schedule virtual visit with any provider re: follow up on hypertension.

## 2021-06-09 NOTE — TELEPHONE ENCOUNTER
RN cannot approve Refill Request    RN can NOT refill this medication PCP messaged that patient is overdue for Labs and Office Visit. Last office visit: 7/24/2017 Dalila Webb CNP Last Physical: Visit date not found Last MTM visit: Visit date not found Last visit same specialty: 4/30/2019 Brea Braswell CNP.  Next visit within 3 mo: Visit date not found  Next physical within 3 mo: Visit date not found      Sanchez Ahuja, [facility] Connection Triage/Med Refill 6/18/2020    Requested Prescriptions   Pending Prescriptions Disp Refills     levothyroxine (SYNTHROID, LEVOTHROID) 25 MCG tablet [Pharmacy Med Name: LEVOTHYROXINE 25 MCG TABLET] 90 tablet 0     Sig: TAKE 1 TABLET BY MOUTH DAILY AT 6 AM       Thyroid Hormones Protocol Failed - 6/18/2020 12:31 AM        Failed - Provider visit in past 12 months or next 3 months     Last office visit with prescriber/PCP: 7/24/2017 Dalila Webb CNP OR same dept: Visit date not found OR same specialty: 4/30/2019 Brea Braswell CNP  Last physical: Visit date not found Last MTM visit: Visit date not found   Next visit within 3 mo: Visit date not found  Next physical within 3 mo: Visit date not found  Prescriber OR PCP: Dalila Webb CNP  Last diagnosis associated with med order: 1. Hypothyroidism  - levothyroxine (SYNTHROID, LEVOTHROID) 25 MCG tablet [Pharmacy Med Name: LEVOTHYROXINE 25 MCG TABLET]; TAKE 1 TABLET BY MOUTH DAILY AT 6 AM  Dispense: 90 tablet; Refill: 0    If protocol passes may refill for 12 months if within 3 months of last provider visit (or a total of 15 months).             Failed - TSH on file in past 12 months for patient age 12 & older     TSH   Date Value Ref Range Status   03/08/2019 3.44 0.30 - 5.00 uIU/mL Final

## 2021-06-11 NOTE — PROGRESS NOTES
ASSESSMENT/PLAN:   1. Acute otitis media, left  amoxicillin-clavulanate (AUGMENTIN) 875-125 mg per tablet   2. Viral URI with cough         Patient presents for left ear pain in conjunction with associated URI symptoms. She did have a significant left otitis media on ear exam. There were crackles in left lung base, raising some suspicion for possibility of a lobar pneumonia as a complication of viral URI, however, less likely given no fevers, hypoxia, or shortness of breath. Regardless, we will treat with antibiotics for the otitis media, so performing CXR would not  as this certainly is not a patient needing hospitalization.  I will broaden coverage for otitis to cover for lungs well. We will treat with Augmentin BID x 10 days. Discussed other supportive cares for URI symptoms. Discussed red flags for immediate return to clinic/ER, as well as indications for follow up if no improvement. Patient understood and agreed to plan. Patient was stable for discharge.      Patient Instructions:  Patient Instructions   Your left ear is infected.  The remainder of your cold symptoms are likely still related to a virus.  We will treat the ear infection with an antibiotic, which will cover for the lungs in the small chance that there is a developing bacterial pneumonia in there.    Please take Augmentin twice daily with food x 10 days. Take a probiotic while on the antibiotic.     May continue to use over the counter mediations for cough, nasal congestion:  -Mucinex 600mg twice daily  -nasal saline or neti pot for congestion  -ibuprofen for pain    Follow up with primary care provider if no improvement in symptoms at all in 3-5 days, sooner if worsening. If developing high fevers, shortness of breath, coughing up blood, or any other new concerning symptoms, come back or go to ER right away.                    SUBJECTIVE:   Alexandra Menezes is a 76 y.o. female who presents today for evaluation of left ear pain x  "1 day. She says overnight she developed left ear ringing and pain and pressure. Pain radiates up to the left temple and into the top teeth. Hearing is muffled in the left ear but no complete hearing loss. No drainage from ear. She admits to decreased balance. She admits to fatigue and \"feels weak.\"  She has had a cold with sore throat and nasal congestion since Friday. She has a \"deep cough\" productive of clear \"phlegm.\" She also admits to hoarseness. No sinus pressure or headache. She admits to pain with swallowing. She is eating and drinking okay. No fever. No shortness of breath. No nausea or vomiting.  No history of frequent ear infections or ear problems.  She had been taking ibuprofen and OTC cold medication for symptoms, which helps somewhat.    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     Localized Osteoarthritis Of The Knee     Breast cancer     Frequent unifocal premature ventricular contractions     Essential hypertension       Surgical History:  Past Surgical History:   Procedure Laterality Date     MASTECTOMY Right 1992           Family History:  Family History   Problem Relation Age of Onset     Rheum arthritis Mother      Heart disease Brother      CABG Brother      Heart disease Maternal Grandmother      Valvular heart disease Father      Hyperthyroidism Sister      Heart disease Sister        Reviewed; Non-contributory      Social History:    History   Smoking Status     Never Smoker   Smokeless Tobacco     Never Used     Smoking: none  Alcohol use: occasionally  Other drug use: none  Occupation: retired      Current Medications:  Current Outpatient Prescriptions on File Prior to Visit   Medication Sig Dispense Refill     " betamethasone dipropionate (DIPROLENE) 0.05 % cream Apply to affected area sparingly twice daily (Patient taking differently: As needed) 45 g 0     CALCIUM CARBONATE (CALCIUM 600 ORAL) Take 1 capsule by mouth 2 (two) times a day.       cholecalciferol, vitamin D3, 1,000 unit tablet Take 1,000 Units by mouth daily.       levothyroxine (SYNTHROID, LEVOTHROID) 25 MCG tablet TAKE ONE TABLET BY MOUTH ONE TIME DAILY 90 tablet 3     lisinopril (PRINIVIL,ZESTRIL) 5 MG tablet Take 1 tablet (5 mg total) by mouth daily. 90 tablet 3     MULTIVIT &MINERALS/FERROUS FUM (MULTI VITAMIN ORAL) Take 1 tablet by mouth daily.       omega-3 fatty acids-vitamin E (FISH OIL) 1,000 mg cap Take by mouth daily.       propafenone (RYTHMOL) 225 MG tablet Take 1 tablet (225 mg total) by mouth 3 (three) times a day. Generic propafenone 270 tablet 3     No current facility-administered medications on file prior to visit.        Allergies:   No Known Allergies    I personally reviewed patient's past medical, surgical, social, family history and allergies.    ROS:  Review of Systems  See HPI, otherwise 6pt ROS negative      OBJECTIVE:   Vitals:    07/03/17 1224 07/03/17 1249   BP: 144/82 128/76   Patient Site:  Left Arm   Patient Position:  Sitting   Cuff Size:  Adult Regular   Pulse: (!) 53    Resp: 16    Temp: 98.5  F (36.9  C)    TempSrc: Oral    SpO2: 98%    Weight: 157 lb 11.2 oz (71.5 kg)          General Appearance:  Alert, generally well-appearing older female in NAD. Afebrile.    HEENT:  Head: Atraumatic, normocephalic. Face nontraumatic.  Eyes: Conjunctiva clear, Lids normal.  Ears:  Right: dull with serous effusion. Left: bulging, erythematous, no visible landmarks. No canal erythema or edema.  Nose: nares patent. + edema and erythema of nasal mucosa. No rhinorrhea. No sinus TTP.  Oropharynx:  No trismus. ++ posterior pharyngeal erythema with scant exudates in tonsillar crypts. No petechiae. No tonsillar hypertrophy. Uvula midline. Moist  mucus membranes.  Neck: Supple, no lymphadenopathy. No meningismus.  Respiratory: No distress. Equal inspiration to bilateral bases. Crackles in left base. No rales or rhonchi or wheezes.  Cardiovascular: Regular rate and rhythm, no murmur, rub or gallop.  Neurologic: Alert and orientated appropriately. No focal deficits.

## 2021-06-11 NOTE — PROGRESS NOTES
Assessment and Plan:     1. Medicare annual wellness visit, initial  At today's visit, we discussed lifestyle interventions to promote self-management and wellness, including maintenance of a healthy weight, healthy diet, regular physical activity and exercise, and falls prevention.  Referral is made for screening bone density scan for screening colonoscopy.  Advised continued yearly mammograms.  Immunizations reviewed and up-to-date, encourage yearly flu vaccines.  She has an advanced healthcare directive.  - DXA Bone Density Scan; Future  - Ambulatory referral for Colonoscopy    2. Breast cancer  Encourage yearly mammograms.  Will obtain hemogram and comprehensive metabolic panel.  - HM2(CBC w/o Differential)  - Comprehensive Metabolic Panel    3. Essential hypertension with goal blood pressure less than 140/90  Controlled on current regimen.  Advised that she monitor closely for low blood pressures.  Will check comprehensive metabolic panel in monitoring of lisinopril.  - Comprehensive Metabolic Panel    4. Frequent unifocal premature ventricular contractions  Stable on Prograf known, will continue to work with cardiology.    5. Sinus bradycardia  Stable and currently asymptomatic.    6. Hypercholesterolemia  Obtain follow-up fasting lipid cascade today.  Advised efforts at healthy lifestyle habits.  She has declined cholesterol medication in the past.  - Lipid Cascade FASTING    7. Hypothyroidism  Clinically stable.  Will check thyroid cascade today.  Continue levothyroxine.  - Thyroid Cascade    8. Localized Osteoarthritis Of The Knee  Chronic and stable, improved with swimming.  She will continue to work with Dr. Robert.    9. Arthritis of left foot  Chronic and stable.    10. Vitamin D deficiency  We will obtain follow-up vitamin D level today.  - Vitamin D, Total (25-Hydroxy)    11. Eczema   I suspect sensitivity reaction to sunscreen versus other.  Improving with hydrocortisone, advised that she continue  2-3 times daily.  Avoid using the same sunscreen again to something that is hypoallergenic.  If inadequate resolution, she will notify me and would consider stronger topical steroid cream.  The patient's current medical problems were reviewed.    I have had an Advance Directives discussion with the patient.  The following high BMI interventions were performed this visit: encouragement to exercise and lifestyle education regarding diet  The following health maintenance schedule was reviewed with the patient and provided in printed form in the after visit summary:   Health Maintenance   Topic Date Due     DXA SCAN  08/09/2007     COLONOSCOPY  09/08/2015     INFLUENZA VACCINE RULE BASED (Season Ended) 08/01/2017     FALL RISK ASSESSMENT  06/19/2018     MAMMOGRAM  09/27/2018     ADVANCE DIRECTIVES DISCUSSED WITH PATIENT  06/19/2022     TD 18+ HE  05/30/2023     PNEUMOCOCCAL POLYSACCHARIDE VACCINE AGE 65 AND OVER  Completed     PNEUMOCOCCAL CONJUGATE VACCINE FOR ADULTS (PCV13 OR PREVNAR)  Completed     ZOSTER VACCINE  Completed        Subjective:   Chief Complaint: Alexandra Menezes is an 76 y.o. female here for an Annual Wellness visit.   HPI: She is also here to establish care with me, previous care provided by Dr. Pamela Wang.  She has a remote history of breast cancer in early 90s, status post right-sided mastectomy.  She did not require any chemotherapy or radiation.  She has history of hyperlipidemia for which she has declined medications in the past, she is fasting for follow-up of this.  She has ongoing difficulties with left knee osteoarthritis managed by Dr. Robert of orthopedics.  History of vitamin D deficiency as well.  She most recently had difficulties feeling poorly related to frequent PVCs and sinus bradycardia, is working with Dr. Smith of cardiology management of this, now is on pro-path unknown with good control of symptoms.  She had a stress echocardiogram in January in light of family history of  arrhythmias and heart disease, that was normal.  She was also started on lisinopril due to moderate elevation of blood pressures, home blood pressures now typically less than 140 systolic, rarely they have been in the 90s without symptoms.    Noticing a rash in the bilateral inner elbows and in her upper chest for the past few weeks.  Seems to be responding to hydrocortisone cream twice daily.  Onset of symptoms shortly after using sunscreen while watching grandchild to track meet.    Noting constant chronic runny nose it seems worse in winter and better in the summer, more irritant than problem for her.  She denies any eye symptoms or other allergy symptoms.  No sinus pressure.    She is  for 20 years.  She has a son and daughter, 5 grandchildren.  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  Very involved in music.  Enjoys traveling.  Exercising by swimming 4 days per week which is doing very well for her knee.    Review of Systems:  Please see above.  The rest of the review of systems are negative for all systems.    Patient Care Team:  Pamela Wang MD as PCP - General  Aakash Smith MD as Physician (Cardiology)  Feed Robert MD as Physician (Orthopedic Surgery)     Patient Active Problem List   Diagnosis     Sinus bradycardia     Herpes Zoster (Shingles)     Hypercholesterolemia     Diffuse Joint Pains (Arthralgias)     Contact Dermatitis     Open Wound Of The Nasal Septum     Hypothyroidism     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     Localized Osteoarthritis Of The Knee     Breast cancer     Frequent unifocal premature ventricular contractions     Essential hypertension     Past Medical History:   Diagnosis Date     Arrhythmia     take propafenone     Hypertension      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     Social History Main Topics     Smoking status: Never Smoker     Smokeless tobacco: Never Used     Alcohol use 1.2 oz/week     2 Glasses of wine per week     Drug use: No     Sexual activity: No     Other Topics Concern     Not on file     Social History Narrative      Current Outpatient Prescriptions   Medication Sig Dispense Refill     betamethasone dipropionate (DIPROLENE) 0.05 % cream Apply to affected area sparingly twice daily (Patient taking differently: As needed) 45 g 0     CALCIUM CARBONATE (CALCIUM 600 ORAL) Take 1 capsule by mouth 2 (two) times a day.       cholecalciferol, vitamin D3, 1,000 unit tablet Take 1,000 Units by mouth daily.       levothyroxine (SYNTHROID, LEVOTHROID) 25 MCG tablet TAKE ONE TABLET BY MOUTH ONE TIME DAILY 90 tablet 3     lisinopril (PRINIVIL,ZESTRIL) 5 MG tablet Take 1 tablet (5 mg total) by mouth daily. 90 tablet 3     MULTIVIT &MINERALS/FERROUS FUM (MULTI VITAMIN ORAL) Take 1 tablet by mouth daily.       omega-3 fatty acids-vitamin E (FISH OIL) 1,000 mg cap Take by mouth daily.       propafenone (RYTHMOL) 225 MG tablet Take 1 tablet (225 mg total) by mouth 3 (three) times a day. Generic propafenone 270 tablet 3     No current facility-administered medications for this visit.       Objective:   Vital Signs:   Visit Vitals     /74 (Patient Site: Right Arm, Patient Position: Sitting, Cuff Size: Adult Regular)     Pulse (!) 53     Temp 97.6  F (36.4  C) (Oral)     Resp 16     Ht 5' 4\" (1.626 m)     Wt 155 lb 9.6 oz (70.6 kg)     SpO2 98%     BMI 26.71 kg/m2        VisionScreening:  No exam data " present     PHYSICAL EXAM  Physical Examination: General appearance - alert, well appearing, and in no distress and oriented to person, place, and time  Mental status - alert, oriented to person, place, and time, normal mood, behavior, speech, dress, motor activity, and thought processes  Eyes - pupils equal and reactive, extraocular eye movements intact; wearing glasses  Ears - bilateral TM's and external ear canals normal  Nose - normal and patent, no erythema, discharge or polyps  Mouth - mucous membranes moist, pharynx normal without lesions  Neck - supple, no significant adenopathy, thyroid exam: thyroid is normal in size without nodules or tenderness  Lymphatics - no palpable lymphadenopathy, no hepatosplenomegaly  Chest - clear to auscultation, no wheezes, rales or rhonchi, symmetric air entry  Heart - normal rate, regular rhythm, normal S1, S2, no murmurs, rubs, clicks or gallops  Abdomen - soft, nontender, nondistended, no masses or organomegaly  Breasts - left breast appears normal, no suspicious masses, no skin or nipple changes or axillary nodes; right breast status post mastectomy without any palpable masses or areas of concern  Extremities - peripheral pulses normal, no pedal edema, no clubbing or cyanosis, no pedal edema noted, prominent superficial veins noted bilateral ankles, right worse than left, asymptomatic.  Skin - normal coloration and turgor, no rashes, no suspicious skin lesions noted  Mild eczematous changes upper chest and bilateral antecubital fossae    Assessment Results 6/19/2017   Activities of Daily Living No help needed   Instrumental Activities of Daily Living No help needed     A Mini-Cog score of 0-2 suggests the possibility of dementia, score of 3-5 suggests no dementia    Identified Health Risks:     The patient reports that she drinks more than one alcoholic drink per day but denies binge or excessive drinking. She was counseled and given information about possible harmful  effects of excessive alcohol intake.  Patient's advanced directive was discussed and I am comfortable with the patient's wishes.

## 2021-06-12 NOTE — TELEPHONE ENCOUNTER
Wellness Screening Tool  Symptom Screening:  Do you have one of the following NEW symptoms:    Fever (subjective or >100.0)?  No    A new cough?  No    Shortness of breath?  No     Chills? No     New loss of taste or smell? No     Generalized body aches? No     New persistent headache? No     New sore throat? No     Nausea, vomiting, or diarrhea?  No    Within the past 2 weeks, have you been exposed to someone with a known positive illness below:    COVID-19 (known or suspected)?  No    Chicken pox?  No    Mealses?  No    Pertussis?  No    Patient notified of visitor policy- They may have one person accompany them to their appointment, but they will need to wear a mask and will be screened upon arrival for symptoms: Yes  Pt informed to wear a mask: Yes  Pt notified if they develop any symptoms listed above, prior to their appointment, they are to call the clinic directly at [phone] for further instructions.  Yes  Patient's appointment status: Patient will be seen in clinic as scheduled on 11/6

## 2021-06-12 NOTE — PROGRESS NOTES
Assessment and Plan:     1. OME (otitis media with effusion), bilateral  fluticasone (FLONASE) 50 mcg/actuation nasal spray   2. Upper respiratory tract infection, unspecified type       Patient has a residual effusion from her previous ear infection.  We will treat symptoms with fluticasone nasal sprays.  Educated on its indications and side effects.  I also suggested that she take over-the-counter antihistamine to assist with postnasal drainage which may be causing her throat irritation.  If fever develops or symptoms worsen, suggest follow-up with Dr. Buchanan.  She is content with the plan.    Subjective:     Alexandra is a 76 y.o. female presenting to the clinic for concerns for ongoing ear pain.  Patient was seen at walk-in clinic on 7/3/17 where she was diagnosed with left acute otitis media.  She was treated with Augmentin twice daily for 10 days.  Patient states her ear pain did improve while taking the antibiotic but returned a few days ago. Patient states she took the full course of the antibiotic.   Patient is now feeling pressure within her right ear as well.  She complains of a popping sensation and a fullness.  She complains of this scratchy throat, postnasal drainage, and a cough which is nonproductive in the morning.  She denies headache, stomachache, nausea, vomiting, fever, rhinorrhea.  She is traveling to [place] August 7 and is concerned that her symptoms returned.    Review of Systems: A complete 14 point review of systems was obtained and is negative or as stated in the history of present illness.    Social History     Social History     Marital status:      Spouse name: N/A     Number of children: N/A     Years of education: N/A     Occupational History     Not on file.     Social History Main Topics     Smoking status: Never Smoker     Smokeless tobacco: Never Used     Alcohol use 1.2 oz/week     2 Glasses of wine per week     Drug use: No     Sexual activity: No     Other Topics  "Concern     Not on file     Social History Narrative       Active Ambulatory Problems     Diagnosis Date Noted     Sinus bradycardia      Herpes Zoster (Shingles)      Hypercholesterolemia      Diffuse Joint Pains (Arthralgias)      Contact Dermatitis      Open Wound Of The Nasal Septum      Hypothyroidism      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      Localized Osteoarthritis Of The Knee      Breast cancer 04/08/2015     Frequent unifocal premature ventricular contractions 01/10/2017     Essential hypertension 01/10/2017     Resolved Ambulatory Problems     Diagnosis Date Noted     Fatigue      Shortness of breath      Open Wound Of The Left Index Finger      Past Medical History:   Diagnosis Date     Arrhythmia      Hypertension      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       Family History   Problem Relation Age of Onset     Rheum arthritis Mother      Heart disease Brother      CABG Brother      Heart disease Maternal Grandmother      Valvular heart disease Father      Hyperthyroidism Sister      Heart disease Sister        Objective:     /62 (Patient Site: Left Arm, Patient Position: Sitting, Cuff Size: Adult Regular)  Pulse 63  Ht 5' 4\" (1.626 m)  Wt 153 lb (69.4 kg)  SpO2 98%  BMI 26.26 kg/m2    Patient is alert, in no obvious distress.   Skin: Warm, dry.  No lesions or rashes.  Skin turgor rapid return.   HEENT:  Head normocephalic, atraumatic.  Eyes normal. Right ear is normal.  Left ear has a small effusion without erythema.  Nose patent, mucosa pink.  Oropharynx mucosa pink.  No lesions or tonsillar enlargement.   Neck: Supple, no lymphadenopathy.   Lungs:  Clear to auscultation. Respirations even and unlabored.  No wheezing or rales noted.   Heart:  Regular rate and rhythm.  No murmurs.           "

## 2021-06-12 NOTE — PATIENT INSTRUCTIONS - HE
Alexandra Menezes    It was a pleasure to see you today for a clinic visit.    Start taking propafenone 150 mg twice daily.  If palpitations increase she can go back to 150 mg 3 times daily or restart propafenone 225 mg twice daily  A cardiac echo has been ordered in 1 year  Follow up appointment:   Dr. Smith 1 week after echo    Contact [facility] at [phone] with questions or concerns.    Aakash Smith MD

## 2021-06-15 PROBLEM — I49.3 FREQUENT UNIFOCAL PREMATURE VENTRICULAR CONTRACTIONS: Status: ACTIVE | Noted: 2017-01-10

## 2021-06-15 PROBLEM — I10 ESSENTIAL HYPERTENSION: Status: ACTIVE | Noted: 2017-01-10

## 2021-06-16 PROBLEM — E55.9 VITAMIN D DEFICIENCY: Status: ACTIVE | Noted: 2018-06-28

## 2021-06-16 PROBLEM — N18.30 CKD (CHRONIC KIDNEY DISEASE) STAGE 3, GFR 30-59 ML/MIN (H): Chronic | Status: ACTIVE | Noted: 2019-03-21

## 2021-06-16 NOTE — TELEPHONE ENCOUNTER
Patient is calling to request an order for right breast prosthesis and bras.  She would like a quantity of 3 bras with 3 refills.    She wants this order faxed to [facility]:  Fax number: [phone]      Order pended for approval

## 2021-06-16 NOTE — PROGRESS NOTES
Assessment and Plan:     1. Medicare annual wellness visit, subsequent  At today's visit, we discussed lifestyle interventions to promote self-management and wellness, including maintenance of a healthy weight, healthy diet, regular physical activity and exercise, and falls prevention.  Will obtain screening hepatitis C antibody today.  Will obtain screening glucose and screening lipids.  Up-to-date with routine cancer screening.  Up-to-date with breast cancer screening.  Bone density scan normal in 2017, not requiring follow-up.  Up-to-date with immunizations.  Consider Shingrix.    2. Essential hypertension  Adequately managed on lisinopril 10 mg daily, which she will continue.  Will check comprehensive metabolic panel and monitoring of kidney function and electrolytes.  - Comprehensive Metabolic Panel    3. Frequent unifocal premature ventricular contractions  4. Sinus bradycardia  She will continue to follow with Dr. Smith, remains on propafenone, controlled.     5. Hypercholesterolemia  Encouraged efforts at healthy lifestyle habits.  Will check fasting lipid cascade today and comprehensive metabolic panel.  At this time she has no interest in medication to treat this.  - Comprehensive Metabolic Panel  - Lipid Cascade FASTING    6. Hypothyroidism, unspecified type  Clinically euthyroid.  Continue levothyroxine at current dose.  Will check thyroid cascade today.  - Thyroid Cascade    7. Stage 3a chronic kidney disease  Avoid nephrotoxic agents.  She remains on an ACE inhibitor.  Will check vitamin D level to assess for secondary deficiency, will assess for secondary anemia with hemogram.  Will assess for secondary hyperparathyroidism with a PTH level today.  Will check fasting lipids to assess risk factors for heart disease.    - Comprehensive Metabolic Panel  - HM2(CBC w/o Differential)  - Lipid Cascade FASTING  - Parathyroid Hormone Intact    8. Vitamin D deficiency  Encouraged daily supplement.  Will check  vitamin D.  - Vitamin D, Total (25-Hydroxy)    9. Paresthesia of both feet  We will check a thyroid cascade, fasting glucose, and vitamin B12 level to look for factors that may be contributing to paresthesias.  As it seems to be more of a regional concern, likely of her referral nerve condition, referral is placed to Dr. Remy Duffy of podiatry for further evaluation.  Reassurance provided that she has strong peripheral adequate circulation on my exam today.  - Vitamin B12    10. History of breast cancer  No evidence of recurrence.  Continue yearly mammogram.  Will check comprehensive metabolic panel and hemogram today to assess for signs of metastases.  - Comprehensive Metabolic Panel  - HM2(CBC w/o Differential)    11. Encounter for hepatitis C screening test for low risk patient  We will check screening hepatitis C antibody today.  - Hepatitis C Antibody (Anti-HCV)     The patient's current medical problems were reviewed.    The following health maintenance schedule was reviewed with the patient and provided in printed form in the after visit summary:   Health Maintenance Due   Topic Date Due     HEPATITIS C SCREENING  Never done     ZOSTER VACCINES (2 of 3) 02/25/2010        Subjective:   Chief Complaint: Alexandra Menezes is an 80 y.o. female here for an Annual Wellness visit.   HPI: She has been doing well over the past year.  She is noting more prominent small varicose veins throughout her feet and heels.  She is noting intermittent tingling and redness on the dorsal midfoot area, often improves after a few minutes when it occurs.  Describes a tingling across the top of her foot at times especially at night for the past month, sometimes awakening her at night.  In general gets better with walking or movement.  Describes a single episode where she felt like her left foot had a single toe that appeared more pale for a minute or 2, it was never painful, resolved without intervention.  She is concerned about  circulation.    History of hypertension for which she remains on lisinopril.  Denies lightheadedness, dizziness, headaches, chest pain, dyspnea, or swelling.  History of sinus bradycardia and frequent PVCs, followed by Dr. Smith of cardiology, remains on propafenone and management of this.  History of dyslipidemia due for follow-up, she reports no interest in taking medications to treat this or to reduce her risk of heart disease.  History of hypothyroidism for which she remains on levothyroxine daily and is feeling in good balance.  History of vitamin D deficiency and chronic kidney disease stage III due for follow-up.  Prior history of breast cancer, up-to-date with mammogram screening.    Review of Systems:   Please see above.  The rest of the review of systems are negative for all systems.    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     Localized Osteoarthritis Of The Knee     History of breast cancer     Frequent unifocal premature ventricular contractions     Essential hypertension     Vitamin D deficiency     CKD (chronic kidney disease) stage 3, GFR 30-59 ml/min     Atrial fibrillation (H)     Past Medical History:   Diagnosis Date     Arrhythmia     take propafenone     Arthritis      Breast cancer (H) 1992    right Mastectomy     CKD (chronic kidney disease) stage 3, GFR 30-59 ml/min 3/21/2019     Disease of thyroid gland     hypothyroid     Hypercholesterolemia      Hypertension      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      Sinus bradycardia     frequent PVC's     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      Past Surgical History:   Procedure Laterality Date     BREAST SURGERY Right 1992    Mastectomy for Ca     CATARACT EXTRACTION W/  INTRAOCULAR LENS IMPLANT Bilateral      MASTECTOMY Right 1992     REPLACEMENT TOTAL KNEE Left 3/21/2019    Procedure: LEFT TOTAL KNEE ARTHROPLASTY,COMPUTER-ASSIST;  Surgeon: Fede Robert MD;  [facility]: [facility] OR;  Service: Orthopedics      Family History   Problem Relation Age of Onset     Rheum arthritis Mother      Heart disease Brother      CABG Brother      Heart disease Maternal Grandmother      Valvular heart disease Father      Hyperthyroidism Sister      Heart disease Sister      Clotting disorder Neg Hx       Social History     Socioeconomic History     Marital status:      Spouse name: Not on file     Number of children: Not on file     Years of education: Not on file     Highest education level: Not on file   Occupational History     Not on file   Social Needs     Financial resource strain: Not on file     Food insecurity     Worry: Not on file     Inability: Not on file     Transportation needs     Medical: Not on file     Non-medical: Not on file   Tobacco Use     Smoking status: Never Smoker     Smokeless tobacco: Never Used   Substance and Sexual Activity     Alcohol use: Yes     Alcohol/week: 2.0 standard drinks     Types: 2 Glasses of wine per week     Drug use: No     Sexual activity: Never   Lifestyle     Physical activity     Days per week: Not on file     Minutes per session: Not on file     Stress: Not on file   Relationships     Social connections     Talks on phone: Not on file     Gets together: Not on file     Attends Judaism service: Not on file     Active member of club or organization: Not on file     Attends meetings of clubs or organizations: Not on file     Relationship status: Not on file     Intimate partner violence     Fear of current or  "ex partner: Not on file     Emotionally abused: Not on file     Physically abused: Not on file     Forced sexual activity: Not on file   Other Topics Concern     Not on file   Social History Narrative     Not on file      Current Outpatient Medications   Medication Sig Dispense Refill     acetaminophen (TYLENOL) 500 MG tablet Take 2 tablets (1,000 mg total) by mouth 3 (three) times a day.  0     calcium carbonate-vitamin D3 (CALCIUM 600 + D,3,) 600 mg(1,500mg) -400 unit per tablet Take 1 tablet by mouth 2 (two) times a day.       cholecalciferol, vitamin D3, 1,000 unit tablet Take 1,000 Units by mouth daily.       latanoprost (XALATAN) 0.005 % ophthalmic solution INSTILL 1 DROP INTO BOTH EYES ONCE DAILY IN THE EVENING  3     levothyroxine (SYNTHROID, LEVOTHROID) 25 MCG tablet TAKE 1 TABLET BY MOUTH DAILY AT 6 AM 90 tablet 3     lisinopriL (PRINIVIL,ZESTRIL) 10 MG tablet Take 1 tablet (10 mg total) by mouth daily. 90 tablet 1     omega-3 fatty acids-fish oil (OMEGA 3 FISH OIL) 684-1,200 mg CpDR Take 2 tablets by mouth every morning.       propafenone (RYTHMOL) 150 MG tablet Take 1 tablet (150 mg total) by mouth 2 (two) times a day. 180 tablet 3     No current facility-administered medications for this visit.       Objective:   Vital Signs:   Visit Vitals  /80   Pulse 62   Temp 97.1  F (36.2  C) (Temporal)   Ht 5' 3.5\" (1.613 m)   Wt 160 lb (72.6 kg)   SpO2 99%   BMI 27.90 kg/m           VisionScreening:  No exam data present     PHYSICAL EXAM  Physical Examination: General appearance - alert, well appearing, and in no distress, oriented to person, place, and time and normal appearing weight  Mental status - alert, oriented to person, place, and time, normal mood, behavior, speech, dress, motor activity, and thought processes  Eyes - pupils equal and reactive, extraocular eye movements intact  Ears - bilateral TM's and external ear canals normal  Nose - normal and patent, no erythema, discharge or polyps  Mouth " - mucous membranes moist, pharynx normal without lesions  Neck - supple, no significant adenopathy  Lymphatics - no palpable lymphadenopathy, no hepatosplenomegaly  Chest - clear to auscultation, no wheezes, rales or rhonchi, symmetric air entry  Heart - normal rate, regular rhythm, normal S1, S2, no murmurs, rubs, clicks or gallops  Abdomen - soft, nontender, nondistended, no masses or organomegaly  Breasts - right breast surgically absent s/p mastectomy; left breast appears normal, no suspicious masses, no skin or nipple changes or axillary nodes  Neurological - alert, oriented, normal speech, no focal findings or movement disorder noted  Musculoskeletal - no joint tenderness, deformity or swelling  Extremities - peripheral pulses normal, no pedal edema, no clubbing or cyanosis  Skin - normal coloration and turgor, no rashes, no suspicious skin lesions noted      Assessment Results 3/24/2021   Activities of Daily Living No help needed   Instrumental Activities of Daily Living No help needed   Mini Cog Total Score 5   Some recent data might be hidden     A Mini-Cog score of 0-2 suggests the possibility of dementia, score of 3-5 suggests no dementia    Identified Health Risks:     The patient's PHQ-9 score is consistent with mild depression. She was provided with information regarding depression and was advised to schedule a follow up appointment in 8 weeks to further address this issue.  She is at risk for falling and has been provided with information to reduce the risk of falling at home.  Patient's advanced directive was discussed and I am comfortable with the patient's wishes.

## 2021-06-17 NOTE — PATIENT INSTRUCTIONS - HE
Surgery Information    **ALL [facility] PAPERWORK IS TO BE FAXED -[phone], IT WILL BE PROCESSED AFTER SURGERY IS COMPLETE.  You MUST have a Preoperative physical within 30 days before surgery!!!      Surgery Date     Surgery Time: The Preop Nurse will call you 1-2 days before with exact arrival time  ( Arrive at the hospital two hours prior to your surgery and 1 hour prior at the surgery center. Check in at the . The only exception is if you are scheduled for surgery at 7am, you should arrive at 5:30am)    IF YOU NEED TO RESCHEDULE OR CANCEL YOUR SURGERY FOR ANY REASON PLEASE CALL THE CLINIC AS SOON AS POSSIBLE -[phone].    Admission Type: Outpatient    Surgeon: Dr. Terrell    Surgery Procedure: Hammertoe Correction 4th toe     Surgery Location: [facility], [address] [place] [address], [address]    COVID TEST:     POST OPERATIVE APPT:     If you take Blood Thinners Such as:  Warfarin, Xarelto, Plavix, Aspirin or Eliquis this will need to be HELD prior to procedure according to primary care provider/doctor or cardiology who prescribes this medication. Generally this is 5 days.    Additional Information: If you use a CPAP machine for sleep apnea please bring it with you for surgery. We will want to monitor your breathing using your normal equipment.   If you need to reach the surgery scheduler please call the main number at [phone] and ask for Nati for Dr. Terrell    \[facility]\"" AND SURGERY CENTER INFORMATION    We need to know a lot of information about you before surgery.     1-5 Days Before:     A nurse will call you for a pre-screening interview. The phone call with the nurse will be much faster and easier if you  Have organized your information prior to the call.     Please have the following information available:    Preoperative Exam completed and faxed to our office    Primary care provider's and any specialty providers' contact information available. .    If requested by your  primary care provider, have any heart and lung exams at least 3 days before surgery.    Have a complete and accurate list of medications available.     Have a list of your allergies/sensitivities and reactions    Know your health history, surgical history, and medical problems    Know any anesthesia issues with you or within your family.     BE SURE TO NOTIFY US IF YOU ARE TAKING ANY BLOOD THINNING AGENTS: Coumadin, Plavix, Aspirin, Xarelto, Eliquis    Someone planned to bring you and stay with you after the surgery    Day Before Surgery    1. STOP Smoking and Drinking: It is important to stop smoking and drinking at least 24 hours before surgery. Smoking and drinking may cause complications in your recovery from anesthesia and may lengthen the healing process.    2. Pack for your hospital stay: If it will be required for you to stay at the hospital after surgery, bring personal items such as a robe, slippers, pajamas, additional clothing and toiletries. Don't forget:    List of medication    Eyeglass case or contact case with solution. You cannot wear contacts during surgery    Copies of your physical exam , lab results and EKG    Copy of Health Care Directives, Living Will or Power of     Insurance Cards    Photo ID    CPAP machine    3. NOTHING BY MOUTH 8 HOURS BEFORE. This includes gum, hard candy, water and mints. The only exception is if you have been instructed by your doctor to take your medications with sips of water. You may rinse your mouth or brush your teeth, but do not swallow water.     4. Remove Nail Polish  Day of Surgery    1. Medications- Take as indicated with sips of water     2. Wear comfortable loose fitting clothes. Wear your glasses-Not contacts. Do not wear jewelry and remove body piercing's. Surgery may be cancelled if they are not removed.     3. If same day surgery-Have a someone come with you to surgery that can help you understand the surgeon's instructions, drive you home and  stay with you overnight the first night.     4. A nurse will call you at home within 72 hours following surgery to see how you are doing. Our nurses and doctors will discuss recovery with you.      The surgery scheduler Nati Terrell will contact you to schedule if you were not scheduled today or you need to make any adjustments to your surgery.     You will need a preop physical within 30 days before surgery with your primary care provider. Please note if you do not get a complete History and Physical your surgery will be cancelled.   Please contact your primary to schedule.     A nurse should contact you from the hospital 1-2 days before surgery to review with you.    If you would like a Good Lita Estimate for your upcoming service/procedure contact Cost of Care Estimates at [phone], advocates are available Monday through Friday 8am - 5pm. You may also submit a request online at [email or web address]    [facility]  [address]  [place]  [address]     [phone] for facility charge    Anesthesiology charge  [phone]        Please don't hesitate to call us with any additional question or problems  Our number is [phone]     Instructions for Patients Scheduled for Vascular or Podiatry Procedures During the COVID 19 Pandemic    Your Provider has determined that your condition warrants going ahead with your procedure at this time.  You will need to be tested for COVID19 within 72 hours of your procedure. We highly encourage patients to get tested for COVID-19 at one of our designated [facility] testing sites. We process the tests in our lab, which allows us to get the results quickly. If you choose to get tested at a non-[facility] location, you will need to contact your primary care provider to make those arrangements and ensure the results are available to your surgeon before you are arriving for  your procedure. If we do not receive the results in time, your procedure may be postponed or canceled.  Please make sure your test is collected 3-days prior to your procedure date.  The results will need to get faxed to [phone].  After testing, you will need to remain in self-quarantine until your procedure.  If you are notified of a positive COVID-19 result; you will need to call your provider for further recommendations.    Please call [phone] and press option 2 to speak to a nurse.  If the test is positive; DO NOT PRESENT FOR YOUR PROCEDURE until you have been given further instructions.  You will not be called with negative results so arrive as instructed unless otherwise notified.  Don't:    Don't invite visitors or friends into your home.    Don't leave your home unless absolutely necessary.    Don't share utensils and other household items with others in the home.  Do:    Wash your hands regularly with soap and water and use hand  with at least 60% alcohol if you don't have easy access to soap and water.     Disinfect surface areas daily, including doorknobs, electronics - especially phones, laptops and other devices.     Wash utensils and other items thoroughly.     Separate yourself from others in the household as best you can, including pets.    Keep your hands away from your face.     Practice all other prevention tips the CDC recommends, including covering your coughs and sneezes with a tissue or your sleeve and immediately throwing the tissue into the trash and washing your hands.    Call your hospital if you develop the following signs before your surgery:    Fever.    Cough.    Shortness of breath.    Sore throat.    Runny or stuffy nose.    Muscle or body aches.    Headaches.    Fatigue.    Vomiting and diarrhea.    These steps will help keep you & your family, other patients, and hospital staff safe from COVID19.

## 2021-06-17 NOTE — PATIENT INSTRUCTIONS - HE
Patient Instructions by Brea Braswell CNP at 3/13/2019  3:20 PM     Author: Brea Braswell CNP Service: -- Author Type: Nurse Practitioner    Filed: 3/13/2019  3:52 PM Encounter Date: 3/13/2019 Status: Signed    : Brea Braswell CNP (Nurse Practitioner)       Patient Education     Viral Upper Respiratory Illness (Adult)  You have a viral upper respiratory illness (URI), which is another term for the common cold. This illness is contagious during the first few days. It is spread through the air by coughing and sneezing. It may also be spread by direct contact (touching the sick person and then touching your own eyes, nose, or mouth). Frequent handwashing will decrease risk of spread. Most viral illnesses go away within 7 to 10 days with rest and simple home remedies. Sometimes the illness may last for several weeks. Antibiotics will not kill a virus, and they are generally not prescribed for this condition.    Home care    If symptoms are severe, rest at home for the first 2 to 3 days. When you resume activity, don't let yourself get too tired.    Avoid being exposed to cigarette smoke (yours or others).    You may use acetaminophen or ibuprofen to control pain and fever, unless another medicine was prescribed. If you have chronic liver or kidney disease, have ever had a stomach ulcer or gastrointestinal bleeding, or are taking blood-thinning medicines, talk with your healthcare provider before using these medicines. Aspirin should never be given to anyone under 18 years of age who is ill with a viral infection or fever. It may cause severe liver or brain damage.    Your appetite may be poor, so a light diet is fine. Avoid dehydration by drinking 6 to 8 glasses of fluids per day (water, soft drinks, juices, tea, or soup). Extra fluids will help loosen secretions in the nose and lungs.    Over-the-counter cold medicines will not shorten the length of time youre sick, but they may be helpful for the following  symptoms: cough, sore throat, and nasal and sinus congestion. (Note: Do not use decongestants if you have high blood pressure.)  Follow-up care  Follow up with your healthcare provider, or as advised.  When to seek medical advice  Call your healthcare provider right away if any of these occur:    Cough with lots of colored sputum (mucus)    Severe headache; face, neck, or ear pain    Difficulty swallowing due to throat pain    Fever of 100.4 F (38 C) or higher, or as directed by your healthcare provider  Call 911  Call 911 if any of these occur:    Chest pain, shortness of breath, wheezing, or difficulty breathing    Coughing up blood    Inability to swallow due to throat pain  Date Last Reviewed: 9/13/2015 2000-2017 [employer]. [address] [place] [address], [place] [address]. All rights reserved. This information is not intended as a substitute for professional medical care. Always follow your healthcare professional's instructions.

## 2021-06-17 NOTE — PATIENT INSTRUCTIONS - HE
Patient Instructions by Argentina Pereyra PT at 4/25/2019  9:00 AM     Author: Argentina Pereyra PT Service: -- Author Type: Physical Therapist    Filed: 4/25/2019  9:15 AM Encounter Date: 4/25/2019 Status: Signed    : Argentina Pereyra PT (Physical Therapist)

## 2021-06-17 NOTE — PROGRESS NOTES
FOOT AND ANKLE SURGERY/PODIATRY CONSULT NOTE         ASSESSMENT:   Hammertoe fourth toe right foot  Contracted extensor tendon fourth toe right foot  Multiple varicosities bilateral feet      TREATMENT:  I have recommended a hammertoe correction of the fourth toe right foot to alleviate the patient's severe pain involving the fourth toe right foot.  The patient was told this procedure would be performed on an outpatient basis under local anesthesia with IV sedation.  She was told procedure would take approximately 10 minutes to perform.  She would then be discharged weightbearing in a postoperative shoe for 3 weeks.  The patient was asked to go n.p.o. at midnight prior to the procedure and she was asked to see her primary care physician for preoperative consultation.  I have also recommended compression stockings.  She is to wear the stockings daily and remove them in the evenings.  All pertinent questions were invited and answered today.        HPI:Alexandra Menezes presented to the clinic today complaining of pain involving both feet.  The patient stated she has had tiny varicose veins in both feet.  She stated she developed them on the top of both of her feet.  This causes some mild to moderate pain.  She also complained of a painful corn fourth toe right foot.  She shaved this corn to get some relief.  She stated that this continues to recur.  She likes to remain active and exercise regularly but the fourth toe because of severe pain.  She wants to remain active and she wanted to know if there were any other options other than just trimming the corn periodically.  It is a burning type pain which is aggravated with weightbearing and ambulation.  She denies any trauma to the foot.  She has not had any redness, swelling, drainage or bleeding.  She denies any other previous treatment.    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       Past Surgical History:   Procedure Laterality Date     BREAST SURGERY Right 1992    Mastectomy for Ca     CATARACT EXTRACTION W/  INTRAOCULAR LENS IMPLANT Bilateral      MASTECTOMY Right 1992     REPLACEMENT TOTAL KNEE Left 3/21/2019    Procedure: LEFT TOTAL KNEE ARTHROPLASTY,COMPUTER-ASSIST;  Surgeon: Fede Robert MD;  Location: [facility];  Service: Orthopedics       No Known Allergies      Current Outpatient Medications:      acetaminophen (TYLENOL) 500 MG tablet, Take 2 tablets (1,000 mg total) by mouth 3 (three) times a day., Disp: , Rfl: 0     calcium carbonate-vitamin D3 (CALCIUM 600 + D,3,) 600 mg(1,500mg) -400 unit per tablet, Take 1 tablet by mouth 2 (two) times a day., Disp: , Rfl:      cholecalciferol, vitamin D3, 1,000 unit tablet, Take 1,000 Units by mouth daily., Disp: , Rfl:      latanoprost (XALATAN) 0.005 % ophthalmic solution, INSTILL 1 DROP INTO BOTH EYES ONCE DAILY IN THE EVENING, Disp: , Rfl: 3     levothyroxine (SYNTHROID, LEVOTHROID) 25 MCG tablet, TAKE 1 TABLET BY MOUTH DAILY AT 6 AM, Disp: 90 tablet, Rfl: 3     lisinopriL (PRINIVIL,ZESTRIL) 10 MG tablet, Take 1 tablet (10 mg total) by mouth daily., Disp: 90 tablet, Rfl: 1     omega-3 fatty acids-fish oil (OMEGA 3 FISH OIL) 684-1,200 mg CpDR, Take 2 tablets by mouth every morning., Disp: , Rfl:      propafenone (RYTHMOL) 150 MG tablet, Take 1 tablet (150 mg total) by mouth 2 (two) times a day., Disp: 180 tablet, Rfl: 3    Family History   Problem Relation Age of Onset     Rheum arthritis Mother      Heart disease Brother      CABG Brother      Heart disease Maternal Grandmother      Valvular heart disease Father       Hyperthyroidism Sister      Heart disease Sister      Clotting disorder Neg Hx        Social History     Socioeconomic History     Marital status:      Spouse name: Not on file     Number of children: Not on file     Years of education: Not on file     Highest education level: Not on file   Occupational History     Not on file   Social Needs     Financial resource strain: Not on file     Food insecurity     Worry: Not on file     Inability: Not on file     Transportation needs     Medical: Not on file     Non-medical: Not on file   Tobacco Use     Smoking status: Never Smoker     Smokeless tobacco: Never Used   Substance and Sexual Activity     Alcohol use: Yes     Alcohol/week: 2.0 standard drinks     Types: 2 Glasses of wine per week     Drug use: No     Sexual activity: Never   Lifestyle     Physical activity     Days per week: Not on file     Minutes per session: Not on file     Stress: Not on file   Relationships     Social connections     Talks on phone: Not on file     Gets together: Not on file     Attends [employer] service: Not on file     Active member of club or organization: Not on file     Attends meetings of clubs or organizations: Not on file     Relationship status: Not on file     Intimate partner violence     Fear of current or ex partner: Not on file     Emotionally abused: Not on file     Physically abused: Not on file     Forced sexual activity: Not on file   Other Topics Concern     Not on file   Social History Narrative     Not on file       Review of Systems - Patient denies fever, chills, rash, wound, stiffness, limping, numbness, weakness, heart burn, blood in stool, chest pain with activity, calf pain when walking, shortness of breath with activity, chronic cough, easy bleeding/bruising, swelling of ankles, excessive thirst, fatigue, depression, anxiety.  Patient admits to painful fourth toe right foot and varicosities bilateral feet.      OBJECTIVE:  Appearance: alert, well  appearing, and in no distress.    Vitals:    05/24/21 1414   BP: 120/70   Pulse: 72   Temp: 98.2  F (36.8  C)   SpO2: 98%       BMI= Body mass index is 27.46 kg/m .    General appearance: Patient is alert and fully cooperative with history & exam.  No sign of distress is noted during the visit.  Psychiatric: Affect is pleasant & appropriate.  Patient appears motivated to improve health.  Respiratory: Breathing is regular & unlabored while sitting.  HEENT: Hearing is intact to spoken word.  Speech is clear.  No gross evidence of visual impairment that would impact ambulation.    Vascular: Dorsalis pedis and posterior tibial pulses are palpable. There is no pedal hair growth bilaterally.  CFT < 3 sec from anterior tibial surface to distal digits bilaterally. There is no appreciable edema noted.  Dermatologic: There is a hyperkeratotic lesion on the lateral aspect fourth toe right foot.  Turgor and texture are within normal limits. No coloration or temperature changes. No primary or secondary lesions noted.  Neurologic: All epicritic and proprioceptive sensations are grossly intact bilaterally.  Musculoskeletal: All active and passive ankle, subtalar, midtarsal, and 1st MPJ range of motion are grossly intact without pain or crepitus, with the exception of none. Manual muscle strength is within normal limits bilaterally. All dorsiflexors, plantarflexors, invertors, evertors are intact bilaterally. Tenderness present to the fourth toe right foot on palpation. Tenderness to the fourth toe right foot with range of motion. Calf is soft/non-tender without warmth/induration    Imaging:         No results found.       Bhupinder Terrell; ISABEL  [facility] Foot & Ankle Surgery/Podiatry

## 2021-06-18 NOTE — LETTER
Letter by Brea Braswell CNP at      Author: Brea Braswell CNP Service: -- Author Type: --    Filed:  Encounter Date: 3/11/2019 Status: (Other)       Alexandra Menezes  [address] [place] [address]             March 11, 2019         Dear Ms. Menezes,    Below are the results from your recent visit:    Resulted Orders   Thyroid Cascade   Result Value Ref Range    TSH 3.44 0.30 - 5.00 uIU/mL   Lipid Cascade RANDOM   Result Value Ref Range    Cholesterol 296 (H) <=199 mg/dL    Triglycerides 109 <=149 mg/dL    HDL Cholesterol 89 >=50 mg/dL    LDL Calculated 185 (H) <=129 mg/dL    Patient Fasting > 8hrs? No    Comprehensive Metabolic Panel   Result Value Ref Range    Sodium 143 136 - 145 mmol/L    Potassium 5.0 3.5 - 5.0 mmol/L    Chloride 106 98 - 107 mmol/L    CO2 27 22 - 31 mmol/L    Anion Gap, Calculation 10 5 - 18 mmol/L    Glucose 96 70 - 125 mg/dL    BUN 27 8 - 28 mg/dL    Creatinine 1.46 (H) 0.60 - 1.10 mg/dL    GFR MDRD Af Amer 42 (L) >60 mL/min/1.73m2    GFR MDRD Non Af Amer 35 (L) >60 mL/min/1.73m2    Bilirubin, Total 0.4 0.0 - 1.0 mg/dL    Calcium 10.8 (H) 8.5 - 10.5 mg/dL    Protein, Total 6.5 6.0 - 8.0 g/dL    Albumin 3.9 3.5 - 5.0 g/dL    Alkaline Phosphatase 68 45 - 120 U/L    AST 26 0 - 40 U/L    ALT 24 0 - 45 U/L    Narrative    Fasting Glucose reference range is 70-99 mg/dL per  American Diabetes Association (ADA) guidelines.   HM1 (CBC with Diff)   Result Value Ref Range    WBC 5.5 4.0 - 11.0 thou/uL    RBC 4.44 3.80 - 5.40 mill/uL    Hemoglobin 13.4 12.0 - 16.0 g/dL    Hematocrit 41.2 35.0 - 47.0 %    MCV 93 80 - 100 fL    MCH 30.2 27.0 - 34.0 pg    MCHC 32.5 32.0 - 36.0 g/dL    RDW 12.7 11.0 - 14.5 %    Platelets 236 140 - 440 thou/uL    MPV 7.4 7.0 - 10.0 fL    Neutrophils % 65 50 - 70 %    Lymphocytes % 25 20 - 40 %    Monocytes % 8 2 - 10 %    Eosinophils % 2 0 - 6 %    Basophils % 0 0 - 2 %    Neutrophils Absolute 3.6 2.0 - 7.7 thou/uL    Lymphocytes Absolute 1.4 0.8 - 4.4  thou/uL    Monocytes Absolute 0.4 0.0 - 0.9 thou/uL    Eosinophils Absolute 0.1 0.0 - 0.4 thou/uL    Basophils Absolute 0.0 0.0 - 0.2 thou/uL       Your thyroid is within normal range. Your complete blood is also normal. Your cholesterol is elevated.  Regular exercise, healthy eating (lots of fresh veggies and fruits, more lean meats and protein, and fewer sweets, baked goods, and bread products), and maintenance of a healthy weight can improve your cholesterol and reduce your risk of developing heart disease or stroke in the future.  We should repeat this test again in a year. Your kidney function is mildly decreased. I would recommend repeating this lab within a few weeks to make sure it improves.     Please call with questions or contact us using [employer].    Sincerely,        Electronically signed by Brea Braswell CNP

## 2021-06-18 NOTE — PATIENT INSTRUCTIONS - HE
Patient Instructions by Donna Buchanna MD at 3/24/2021  9:50 AM     Author: Donna Buchanan MD Service: -- Author Type: Physician    Filed: 3/24/2021 10:56 AM Encounter Date: 3/24/2021 Status: Addendum    : Donna Buchanan MD (Physician)    Related Notes: Original Note by Donna Buchanan MD (Physician) filed at 3/24/2021 10:55 AM       As we discussed, you have a prior history of colon polyps that could potentially develop into cancer in the future.  However, you have no additional risk factors for colon cancer.  You could consider a follow-up colonoscopy, however there is no strong indication.  We can review again next year.    Will check blood work including your blood counts, thyroid levels, vitamin B12 level, and blood sugar.  If all are normal, you could consider seeing a podiatrist for further evaluation of the tingling in your feet.  I recommend Dr. Remy Duffy [facility] in [place].  Patient Education   Depression and Suicide in Older Adults  Nearly 2 million older Americans have some type of depression. Sadly, some of them even take their own lives. Yet depression among older adults is often ignored. Learn the warning signs. You may help spare a loved one needless pain. You may also save a life.       What Is Depression?  Depression is a mood disorder that affects the way you think and feel. The most common symptom is a feeling of deep sadness. People who are depressed also may seem tired and listless. And nothing seems to give them pleasure. Its normal to grieve or be sad sometimes. But sadness lessens or passes with time. Depression rarely goes away or improves on its own. Other symptoms of depression are:    Sleeping more or less than normal    Eating more or less than normal    Having headaches, stomachaches, or other pains that dont go away    Feeling nervous, empty, or worthless    Crying a great deal    Thinking or talking about suicide or death    Feeling  confused or forgetful  What Causes It?  The causes of depression arent fully known. Certain chemicals in the brain play a role. Depression does run in families. And life stresses can also trigger depression in some people. That may be the case with older adults. They often face great burdens, such as the death of friends or a spouse. They may have failing health. And they are more likely to be alone, lonely, or poor.  How You Can Help  Often, depressed people may not want to ask for help. When they do, they may be ignored. Or, they may receive the wrong treatment. You can help by showing parents and older friends love and support. If they seem depressed, help them find the right treatment. Talk to your doctor. Or contact a local mental health center, social service agency, or hospital. With modern treatment, no one has to suffer from depression.  Resources:    National [place] of Mental Health  194.280.5159  www.nimh.nih.gov    National [place] on Mental Illness  942.652.1169  www.leno.org    [facility] [place]  797.809.8084  www.[facility].org    National Suicide Hotline  512.267.4003 (800-SUICIDE)      5990-8532 [employer]. [address] [place] [address]. All rights reserved. This information is not intended as a substitute for professional medical care. Always follow your healthcare professional's instructions.         Patient Education   Preventing Falls in the Home  As you get older, falls are more likely. Thats because your reaction time slows. Your muscles and joints may also get stiffer, making them less flexible. Illness, medications, and vision changes can also affect your balance. A fall could leave you unable to live on your own. To make your home safer, follow these tips:    Floors    Put nonskid pads under area rugs.    Remove throw rugs.    Replace worn floor coverings.    Tack carpets firmly to each step on carpeted stairs. Put nonskid strips on the edges of uncarpeted  stairs.    Keep floors and stairs free of clutter and cords.    Arrange furniture so there are clear pathways.    Clean up any spills right away.    Bathrooms    Install grab bars in the tub or shower.    Apply nonskid strips or put a nonskid rubber mat in the tub or shower.    Sit on a bath chair to bathe.    Use bathmats with nonskid backing.    Lighting    Keep a flashlight in each room.    Put a nightlight along the pathway between the bedroom and the bathroom.    4414-6668 [employer]. [address] [place] [address]. All rights reserved. This information is not intended as a substitute for professional medical care. Always follow your healthcare professional's instructions.           Advance Directive  Patients advance directive was discussed and I am comfortable with the patients wishes.  Patient Education   Personalized Prevention Plan  You are due for the preventive services outlined below.  Your care team is available to assist you in scheduling these services.  If you have already completed any of these items, please share that information with your care team to update in your medical record.  Health Maintenance Due   Topic Date Due   ? HEPATITIS C SCREENING  Never done   ? ZOSTER VACCINES (2 of 3) 02/25/2010

## 2021-06-19 NOTE — PROGRESS NOTES
Chief Complaint   Patient presents with     Fall     pt fell face forward when she tripped over her shoe. right arm must of hit her rig cage that is causing her to have pain when breathing. Happened 1x day ago.          HPI    Patient is here for a fall yesterday. She tripped and fell face down in her house, wood floor. She sustained injuries to her face. She had a brief right sided nose bleeding, abrasion of nose bridge, and pain at left side of her face, and left lower jaw. She also reported bilateral ribs pain more on the right side. In addition, she reported mild pain at right knee. No loss of consciousness, headache, nausea, vomiting, visual disturbances, chest pain, shortness of breath, dizziness, abdominal pain.     ROS: Pertinent ROS noted in HPI.     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     Localized Osteoarthritis Of The Knee     Breast cancer (H)     Frequent unifocal premature ventricular contractions     Essential hypertension     Vitamin D deficiency       Family History   Problem Relation Age of Onset     Rheum arthritis Mother      Heart disease Brother      CABG Brother      Heart disease Maternal Grandmother      Valvular heart disease Father      Hyperthyroidism Sister      Heart disease Sister        Social History     Social History     Marital status:      Spouse name: N/A     Number of children: N/A     Years of education: N/A     Occupational History     Not on file.     Social History Main Topics     Smoking status: Never Smoker     Smokeless tobacco: Never Used     Alcohol use 1.2 oz/week     2 Glasses of wine per week     Drug use: No     Sexual activity: No     Other Topics Concern     Not on file     Social History Narrative         Objective:    Vitals:    08/07/18 0739   BP: 124/78   Pulse: (!) 52   Resp: 12   Temp: 98  F  (36.7  C)   SpO2: 100%       Gen: well appearing  Head: small scrape/abrasion mid nasal bridge without active bleeding, minor superficial scrapes at left maxillary bone, and ecchymosis at left lower mandible. Moderate pain to palpation at left maxillary area, and at left lower mandibular area. No step-off abnormalities. Scalp exam clear of evidence of trauma.  Throat: oropharynx clear, no evidence of trauma to gums, teeth, tongue.  Ears: TMs clear without effusion, ear canals normal with small cerumen  Nose: normal nasal mucosa without evidence of trauma, bleeding. About 1 mm abrasion at mid nasal bridge without active bleeding, with mild pain to palpation. No obvious nasal bridge deformity.   Neck: normal inspection, normal palpation, full ROM in all planes without pain.   CV: RRR, normal S1S2, no M, R,G  Pulm: CTAB, normal effort  Chest wall: normal inspection, moderate pain to palpation of ribs anteriorly below both breasts.  Abd: Normal bowel sounds, soft, no pain, no mass.  MSK: normal inspection of back, bilateral upper and lower extremities. Normal palpation of C, T, L spines, bilateral upper and lower extremities. Full ROM of all extremities with 5/5 strength. Normal gait.  Neuro: Cranial nerves: Normal facial movements, normal speech.  Tongue is midline.  Normal eye movement.  Shoulder shrug is strong and symmetrical.   Skin: negative except as noted above.      XR - no acute rib fractures per my interpretation, clears clear, discussed during visit.    CT facial bones - soft tissue swelling without fractures per radiology, discussed during visit.        Facial injury, initial encounter  -     CT Facial Bones Without Contrast    Rib pain on left side  -     XR Ribs Bilateral    Rib pain on right side  -     XR Ribs Bilateral      Advised cool compresses and f/u with PCP if symptoms fail to improve, or in UC/ER if escalate.

## 2021-06-19 NOTE — LETTER
Letter by Argentina Pereyra PT at      Author: Argentina Pereyra PT Service: -- Author Type: --    Filed:  Encounter Date: 4/25/2019 Status: (Other)         April 25, 2019     Shadi Michel PA-C  [address] [place] [id number]    Patient: Alexandra Menezes   MR Number: [id number]   YOB: 1940   Date of Visit: 4/25/2019     Dear Dr. Anand PA-C:    Thank you for referring Alexandra Menezes to me for Physical Therapy. Below are the relevant portions of my assessment and plan of care.    If you have questions, please do not hesitate to call me. I look forward to following Alexandra along with you.    Sincerely,        Argentina Pereyra PT        CC  No Recipients            [facility] Daily Progress -Discharge  Preferred name :Irma  Patient Name: Alexandra Menezes  Date: 4/25/2019  Visit #: 5/8-10  PTA visit #:  -    Date of evaluation: 4/10//2019  Referral Diagnosis: Orthopedic aftercare for joint replacement  Referring provider: Shadi Michel PA-[facility] Diagnosis:     ICD-10-CM    1. Chronic pain of left knee M25.562     G89.29    2. Abnormality of gait R26.9          Assessment:       [facility] Discharge Summary  Patient Name: Alexandra Menezes  Date: 4/25/2019  Referral Diagnosis: Orthopedic aftercare for joint replacement    Referring provider: Shadi Michel PA-C  Visit Diagnosis:   1. Chronic pain of left knee     2. Abnormality of gait         Goals:  Pt. will demonstrate/verbalize independence in self-management of condition in : 4 weeks;Met  Pt. will be able to walk : 30 minutes;with FWB;on even surfaces;with less pain;for household mobility;for community mobility;in 4 weeks;Met  Patient will ascend / descend: stairs;with recipricol gait;without assistive device;independently;with less pain;in 4 weeks;Met      Patient was seen for 05 visits from 4-10-19 to 4-25-19 with 0 missed appointments.    Lower Extremity Functional Scale  "():     Patient left knee ROM is WFL. Patient is independent with ambualtion.  The patient met goals and has demonstrated understanding of and independence in the home program for self-care, and progression to next steps.  She will initiate contact if questions or concerns arise.  The patient was instructed to follow up with physician's clinic.  Patient received a home program and was instructed to continue with community ambulation.    Therapy will be discontinued at this time.  The patient will need a new referral to resume.    Thank you for your referral.  Argentina Pereyra PT  2019  9:59 AM      Left knee ROM is WFL, no edema.  Surgical tape is in place with borders getting loose.    Patient is independent with ambulation with reciprocal gait,good neetu, no assistive device.    Goal Status:  Pt. will demonstrate/verbalize independence in self-management of condition in : 4 weeks;Met  Pt. will be able to walk : 30 minutes;with FWB;on even surfaces;with less pain;for household mobility;for community mobility;in 4 weeks;Met  Patient will ascend / descend: stairs;with recipricol gait;without assistive device;independently;with less pain;in 4 weeks;Met        Plan / Patient Education:     Discharge from physical therapy all goals met.    Subjective:     Pain Ratin  \"my knee feels good\".      Objective:     Left knee seated: flexion 125o  Left knee extension supine: -1o ( ba are getting loosekers cyst on popliteal area).  Surgical strips in place.    Skin intact, good tolerance to kinesio tape    Treatment Today     TREATMENT MINUTES COMMENTS   Evaluation     Self-care/ Home management     Manual therapy 14 STM: left knee edema evacuation massage, skin intact.   Neuromuscular Re-education     Therapeutic Activity     Therapeutic Exercises 45 Exercises:  Exercise #1: supine: ankle pumps, heel slides, TKE, hip abd/add, SLR x 10 repetitions each.  Comment #1: standing holding onto paralellel bars: " marching, hip extension/abduction, mini squats, knee flexion, stepping up/down with left leg x 20 repetions, minimal fatiguee.  Exercise #2: gait sequence with normal neetu, independent, no assistive device.  Comment #2: nu step: seat: 8, level:2 , arms 10, x 15 minutes, good tolerance  Exercise #3: one leg standing, alternating, x 10 repetitions, no LOB.  Comment #3: lower extremity closed chain x 5 repetitions each, provided written instructions.       Gait training     Modality__________________                Total 59    Blank areas are intentional and mean the treatment did not include these items.       Argentina Pereyra PT  4/25/2019

## 2021-06-20 NOTE — PROGRESS NOTES
Preoperative Exam    Scheduled Procedure: cataract  Surgery Date:  Rt 8-28-18  Lt 9-18-18  Surgery Location: [facility], [place], fax [phone]    Surgeon:  Dr Duvall    Assessment/Plan:     1. Preop general physical exam  Surgical risks include controlled hypertension, hypercholesterolemia, and mild sinus bradycardia with use of antiarrhythmics with no known recurrence of her frequent premature ventricular contractions.  At this time she is adequately medically managed to proceed with surgery as scheduled without further clinical clarification or evaluation and may proceed with choice of anesthesia.  She may continue her usual medications before and after surgery.    2. Cataract  To proceed with surgical treatment as noted.    3. Essential hypertension  Adequately controlled with lisinopril.    4. Hypercholesterolemia  He treats this with lifestyle habits.    5. Frequent unifocal premature ventricular contractions  Controlled with propafenone.  Asymptomatic currently.  Followed by cardiology.    6. Sinus bradycardia  Stable and asymptomatic.  Cardiology is comfortable with current heart rate.    7. Acquired hypothyroidism  She remains on levothyroxine.  Because she is on a very low dose, she is interested in potentially discontinuing this in the future.  We discussed that following her surgery we could consider reducing administration to every other day and then rechecking her thyroid about 2 months later.  She will consider.    8. Breast cancer (H)  Without evidence of recurrence.    Surgical Procedure Risk: Low (reported cardiac risk generally < 1%)  Have you had prior anesthesia?: Yes  Have you or any family members had a previous anesthesia reaction:  No  Do you or any family members have a history of a clotting or bleeding disorder?: No  Cardiac Risk Assessment: no increased risk for major cardiac complications    Patient approved for surgery with general or local anesthesia.      Functional  Status: Independent  Patient plans to recover at home alone.     Subjective:      Alexandra Menezes is a 78 y.o. female who presents for a preoperative consultation.  She has had progressive visual disruption due to cataracts with blurring of vision and difficulty both with reading and far distance vision, requiring surgical treatment.    Hypertension is been controlled with lisinopril daily.  She is a history of frequent unifocal premature ventricular contractions that resulted in severe fatigue, currently treated with propafenone.  She now has sinus bradycardia that is been stable and asymptomatic, monitored by cardiology.  Known hypercholesterolemia, we have elected not to treat this.  History of breast cancer, remains disease-free following mastectomy.    All other systems reviewed and are negative, other than those listed in the HPI.    Pertinent History  Do you have difficulty breathing or chest pain after walking up a flight of stairs: No  History of obstructive sleep apnea: No  Steroid use in the last 6 months: No  Frequent Aspirin/NSAID use: No  Prior Blood Transfusion: No  Prior Blood Transfusion Reaction: No  If for some reason prior to, during or after the procedure, if it is medically indicated, would you be willing to have a blood transfusion?:  There is no transfusion refusal.    Current Outpatient Prescriptions   Medication Sig Dispense Refill     betamethasone dipropionate (DIPROLENE) 0.05 % cream Apply to affected area sparingly twice daily (Patient taking differently: As needed) 45 g 0     CALCIUM CARBONATE (CALCIUM 600 ORAL) Take 1 capsule by mouth 2 (two) times a day.       cholecalciferol, vitamin D3, 1,000 unit tablet Take 1,000 Units by mouth daily.       latanoprost (XALATAN) 0.005 % ophthalmic solution INSTILL 1 DROP INTO BOTH EYES ONCE DAILY IN THE EVENING  3     levothyroxine (SYNTHROID, LEVOTHROID) 25 MCG tablet Take 1 tablet (25 mcg total) by mouth Daily at 6:00 am. 90 tablet 0      lisinopril (PRINIVIL,ZESTRIL) 10 MG tablet Take 1 tablet (10 mg total) by mouth daily. 90 tablet 1     moxifloxacin (VIGAMOX) 0.5 % ophthalmic solution   0     MULTIVIT &MINERALS/FERROUS FUM (MULTI VITAMIN ORAL) Take 1 tablet by mouth daily.       omega-3 fatty acids-vitamin E (FISH OIL) 1,000 mg cap Take by mouth daily.       prednisoLONE acetate (PRED-FORTE) 1 % ophthalmic suspension   1     propafenone (RYTHMOL) 225 MG tablet TAKE 1 TABLET BY MOUTH 3 TIMES A DAY. 270 tablet 2     No current facility-administered medications for this visit.         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     Social History Main Topics     Smoking status: Never Smoker     Smokeless tobacco: Never Used     Alcohol use 1.2 oz/week     2 Glasses of wine per week     Drug use: No     Sexual activity: No     Other Topics Concern     Not on file     Social History Narrative       Patient Care Team:  Donna Buchanan MD as PCP - General (Family Medicine)  Aakash Smith MD as Physician (Cardiology)  Fede Robert MD as Physician (Orthopedic Surgery)  Ronak Singletary MD as Physician (Ophthalmology)          Objective:     Vitals:    08/22/18 1106   BP: 118/78   Pulse: (!) 52   Resp: 20   Temp:  "98.1  F (36.7  C)   TempSrc: Oral   SpO2: 99%   Weight: 152 lb 6.4 oz (69.1 kg)   Height: 5' 4\" (1.626 m)         Physical Exam:  Physical Examination: General appearance - alert, well appearing, and in no distress, oriented to person, place, and time and normal appearing weight  Mental status - alert, oriented to person, place, and time, normal mood, behavior, speech, dress, motor activity, and thought processes  Eyes - pupils equal and reactive, extraocular eye movements intact, wars glasses  Ears - bilateral TM's and external ear canals normal  Nose - normal and patent, no erythema, discharge or polyps  Mouth - mucous membranes moist, pharynx normal without lesions  Neck - supple, no significant adenopathy  Lymphatics - no palpable lymphadenopathy, no hepatosplenomegaly  Chest - clear to auscultation, no wheezes, rales or rhonchi, symmetric air entry  Heart - normal rate, regular rhythm, normal S1, S2, no murmurs, rubs, clicks or gallops  Abdomen - soft, nontender, nondistended, no masses or organomegaly  Neurological - alert, oriented, normal speech, no focal findings or movement disorder noted  Musculoskeletal - no joint tenderness, deformity or swelling  Extremities - peripheral pulses normal, no pedal edema, no clubbing or cyanosis  Skin - normal coloration and turgor, no rashes, no suspicious skin lesions noted.  Faint bruise left jawline from recent fall.    There are no Patient Instructions on file for this visit.      Labs:  No labs were ordered during this visit    Immunization History   Administered Date(s) Administered     DT (pediatric) 12/11/2000, 07/29/2003     Hep A, historic 04/25/2006, 10/26/2006     IPV 04/25/2006     Meningococcal MPSV4, SQ 04/25/2006     Pneumo Conj 13-V (2010&after) 06/29/2015     Pneumo Polysac 23-V 08/30/2007     Td, Adult, Absorbed 07/29/2003     Tdap 05/30/2013     Typhoid Live, Oral 05/26/2009     Typhoid, ViCPs 04/25/2006     ZOSTER, LIVE 12/31/2009 "           Electronically signed by Donna Buchanan MD 08/22/18 11:03 AM

## 2021-06-21 NOTE — LETTER
Letter by Donna Buchanna MD at      Author: Donna Buchanan MD Service: -- Author Type: --    Filed:  Encounter Date: 3/30/2021 Status: (Other)         Alexandra Menezes  [address] [place] [address]             March 30, 2021         Dear Ms. Menezes,    Below are the results from your recent visit:    Resulted Orders   Hepatitis C Antibody (Anti-HCV)   Result Value Ref Range    Hepatitis C Ab Negative Negative   Comprehensive Metabolic Panel   Result Value Ref Range    Sodium 141 136 - 145 mmol/L    Potassium 4.6 3.5 - 5.0 mmol/L    Chloride 105 98 - 107 mmol/L    CO2 25 22 - 31 mmol/L    Anion Gap, Calculation 11 5 - 18 mmol/L    Glucose 93 70 - 125 mg/dL    BUN 18 8 - 28 mg/dL    Creatinine 1.07 0.60 - 1.10 mg/dL    GFR MDRD Af Amer 60 (L) >60 mL/min/1.73m2    GFR MDRD Non Af Amer 50 (L) >60 mL/min/1.73m2    Bilirubin, Total 0.6 0.0 - 1.0 mg/dL    Calcium 9.7 8.5 - 10.5 mg/dL    Protein, Total 6.7 6.0 - 8.0 g/dL    Albumin 4.2 3.5 - 5.0 g/dL    Alkaline Phosphatase 80 45 - 120 U/L    AST 24 0 - 40 U/L    ALT 19 0 - 45 U/L    Narrative    Fasting Glucose reference range is 70-99 mg/dL per  American Diabetes Association (ADA) guidelines.   HM2(CBC w/o Differential)   Result Value Ref Range    WBC 3.8 (L) 4.0 - 11.0 thou/uL    RBC 4.65 3.80 - 5.40 mill/uL    Hemoglobin 13.7 12.0 - 16.0 g/dL    Hematocrit 42.5 35.0 - 47.0 %    MCV 91 80 - 100 fL    MCH 29.5 27.0 - 34.0 pg    MCHC 32.2 32.0 - 36.0 g/dL    RDW 13.1 11.0 - 14.5 %    Platelets 224 140 - 440 thou/uL    MPV 9.4 7.0 - 10.0 fL   Lipid Cascade FASTING   Result Value Ref Range    Cholesterol 313 (H) <=199 mg/dL    Triglycerides 104 <=149 mg/dL    HDL Cholesterol 87 >=50 mg/dL    LDL Calculated 205 (H) <=129 mg/dL    Patient Fasting > 8hrs? Yes    Thyroid Cascade   Result Value Ref Range    TSH 3.15 0.30 - 5.00 uIU/mL   Vitamin D, Total (25-Hydroxy)   Result Value Ref Range    Vitamin D, Total (25-Hydroxy) 38.6 30.0 - 80.0 ng/mL     Narrative    Deficiency <10.0 ng/mL  Insufficiency 10.0-29.9 ng/mL  Sufficiency 30.0-80.0 ng/mL  Toxicity (possible) >100.0 ng/mL   Parathyroid Hormone Intact   Result Value Ref Range    PTH 96 (H) 10 - 86 pg/mL   Vitamin B12   Result Value Ref Range    Vitamin B-12 522 213 - 816 pg/mL       Your vitamin D level is currently normal, however your parathyroid hormone level is just a bit elevated, suggestingtThat you are not getting quite enough vitamin D.  I recommend increasing your daily intake of vitamin D3 by 2000 units.  I would like to recheck your vitamin D level and parathyroid hormone level in about 2 or 3 months.    Please call with questions or contact us using [facility].    Sincerely,        Electronically signed by Donna Buchanan MD

## 2021-06-24 NOTE — TELEPHONE ENCOUNTER
Sched for knee surgery week from tomorrow.    Has ST>  HAs been taking ibuprofen.    No fever. Thermometer not working.    Would like to be seen today.    Transferred to scheduling for an appointment.        Lisa Lowry RN, [facility] Nurse Triage/Med Refills RN

## 2021-06-24 NOTE — PROGRESS NOTES
Preoperative Exam    Scheduled Procedure: left knee replacement  Surgery Date:  3/21/19  Surgery Location: [facility], fax [phone]    Surgeon:  Dr. Gomez     Assessment/Plan:     1. Pre-op exam  2. Localized Osteoarthritis Of The Knee  Preoperative exam completed today.  EKG performed and interpreted in clinic.  Does not appear to show any significant changes.  Will have cardiology confirm this.  Will obtain the following labs as noted.  Reviewed patient's current medications and allergies.  No contraindications to the procedure were identified.  Patient may proceed with scheduled procedure with choice of anesthesia.  - Electrocardiogram Perform and Read  - HM1(CBC and Differential)  - HM1 (CBC with Diff)    3. Frequent unifocal premature ventricular contractions  4. Sinus bradycardia  Stable on propafenone.  Managed by cardiology.    5. Essential hypertension  Blood pressure remains well controlled on current dose of lisinopril.  We will check conference of metabolic panel today.  - Comprehensive Metabolic Panel    6. Acquired hypothyroidism  Stable.  Patient is asymptomatic.  Will recheck thyroid cascade today.  - Thyroid [place]    7. Hypercholesterolemia  History of hypercholesterolemia with diet and exercise controlled.  Patient is not fasting today however will still check lipids today and recheck fasting if indicated.  - Lipid Cascade RANDOM     8.  Breast cancer (H)  History of breast cancer.  No indication of recurrence.  Recommend yearly mammograms for monitoring.    Surgical Procedure Risk: Intermediate (reported cardiac risk generally 1-5%)  Have you had prior anesthesia?: Yes  Have you or any family members had a previous anesthesia reaction:  No  Do you or any family members have a history of a clotting or bleeding disorder?: No  Cardiac Risk Assessment: no increased risk for major cardiac complications    Patient approved for surgery with general or local anesthesia.    Functional  Status: Independent  Patient plans to recover at home alone.     Subjective:      Alexandra Menezes is a 78 y.o. female who presents for a preoperative consultation.  Patient will be undergoing a left knee replacement.  Patient has been dealing with degenerative disease.  She has been able to manage it with over-the-counter medications and lifestyle modifications up until recently when pain has gotten worse and more constant.  She continues to swim 5 days for exercise which patient enjoys.  Past medical history includes breast cancer, no evidence of recurrence.  Hypertension, managed with lisinopril.  She is tolerating well without any side effects.  Hyperlipidemia, diet and exercise controlled.  She is not fasting today but would like to have this checked while she is having blood work done.  She remains on levothyroxine for hypothyroidism.  She is asymptomatic.  She continues to take vitamin D supplement daily.  She does follow with cardiology for management of frequent unifocal premature ventricular contractions and sinus bradycardia.  She remains on propafenone and is asymptomatic.  Patient reports tolerating anesthesia well in the past, no adverse effects.  She denies any recent illness.  Overall feels well and does not have any additional concerns today.    All other systems reviewed and are negative, other than those listed in the HPI.    Pertinent History  Do you have difficulty breathing or chest pain after walking up a flight of stairs: No  History of obstructive sleep apnea: No  Steroid use in the last 6 months: No  Frequent Aspirin/NSAID use: No  Prior Blood Transfusion: No  Prior Blood Transfusion Reaction: No  If for some reason prior to, during or after the procedure, if it is medically indicated, would you be willing to have a blood transfusion?:  There is no transfusion refusal.    Current Outpatient Medications   Medication Sig Dispense Refill     betamethasone dipropionate (DIPROLENE) 0.05 % cream  Apply to affected area sparingly twice daily (Patient taking differently: As needed) 45 g 0     CALCIUM CARBONATE (CALCIUM 600 ORAL) Take 1 capsule by mouth 2 (two) times a day.       cholecalciferol, vitamin D3, 1,000 unit tablet Take 1,000 Units by mouth daily.       latanoprost (XALATAN) 0.005 % ophthalmic solution INSTILL 1 DROP INTO BOTH EYES ONCE DAILY IN THE EVENING  3     levothyroxine (SYNTHROID, LEVOTHROID) 25 MCG tablet TAKE 1 TABLET BY MOUTH DAILY AT 6 AM 90 tablet 2     lisinopril (PRINIVIL,ZESTRIL) 10 MG tablet TAKE 1 TABLET BY MOUTH DAILY 90 tablet 2     moxifloxacin (VIGAMOX) 0.5 % ophthalmic solution   0     MULTIVIT &MINERALS/FERROUS FUM (MULTI VITAMIN ORAL) Take 1 tablet by mouth daily.       omega-3 fatty acids-vitamin E (FISH OIL) 1,000 mg cap Take by mouth daily.       prednisoLONE acetate (PRED-FORTE) 1 % ophthalmic suspension   1     propafenone (RYTHMOL) 225 MG tablet TAKE 1 TABLET BY MOUTH 3 TIMES A DAY. (Patient not taking: Reported on 3/8/2019) 270 tablet 2     No current facility-administered medications for this visit.         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     Localized Osteoarthritis Of The Knee     Breast cancer (H)     Frequent unifocal premature ventricular contractions     Essential hypertension     Vitamin D deficiency       Past Medical History:   Diagnosis Date     Arrhythmia     take propafenone     Hypertension      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       Past Surgical History:   Procedure Laterality Date     MASTECTOMY Right 1992       Social History     Socioeconomic History     Marital status:      Spouse name: Not on file     Number of children: Not on file     Years of education: Not on file     Highest education level: Not on file   Occupational History     Not on file   Social Needs      "Financial resource strain: Not on file     Food insecurity:     Worry: Not on file     Inability: Not on file     Transportation needs:     Medical: Not on file     Non-medical: Not on file   Tobacco Use     Smoking status: Never Smoker     Smokeless tobacco: Never Used   Substance and Sexual Activity     Alcohol use: Yes     Alcohol/week: 1.2 oz     Types: 2 Glasses of wine per week     Drug use: No     Sexual activity: No   Lifestyle     Physical activity:     Days per week: Not on file     Minutes per session: Not on file     Stress: Not on file   Relationships     Social connections:     Talks on phone: Not on file     Gets together: Not on file     Attends Jainism service: Not on file     Active member of club or organization: Not on file     Attends meetings of clubs or organizations: Not on file     Relationship status: Not on file     Intimate partner violence:     Fear of current or ex partner: Not on file     Emotionally abused: Not on file     Physically abused: Not on file     Forced sexual activity: Not on file   Other Topics Concern     Not on file   Social History Narrative     Not on file       Patient Care Team:  Donna Buchanan MD as PCP - General (Family Medicine)  Aakash Smith MD as Physician (Cardiology)  Fede Robert MD as Physician (Orthopedic Surgery)  Ronak Singletary MD as Physician (Ophthalmology)          Objective:     Vitals:    03/08/19 1316   BP: 120/62   Pulse: 76   Weight: 156 lb 14.4 oz (71.2 kg)   Height: 5' 4\" (1.626 m)         Physical Exam:    General Appearance:    Alert, cooperative, no distress, appears stated age.     Head:    Normocephalic, without obvious abnormality, atraumatic   Eyes:    PERRL, conjunctiva/corneas clear, EOM's intact, fundi     benign, both eyes        Ears:    Normal TM's and external ear canals, both ears   Nose:   Nares normal, septum midline, mucosa normal, no drainage    or sinus tenderness   Throat:   Lips, mucosa, and tongue " normal; teeth and gums normal   Neck:   Supple, symmetrical, trachea midline, no adenopathy;        thyroid:  No enlargement/tenderness/nodules; no carotid    bruit or JVD   Back:     Symmetric, no curvature, ROM normal, no CVA tenderness   Lungs:     Clear to auscultation bilaterally, respirations unlabored   Chest wall:    No tenderness or deformity   Heart:    Regular rate and rhythm, S1 and S2 normal, no murmur, rub   or gallop   Abdomen:     Soft, non-tender, bowel sounds active all four quadrants,     no masses, no organomegaly.     Genitalia:    Deferred per patient.   Rectal:    Deferred per patient.   Extremities:   Extremities normal, atraumatic, no cyanosis or edema   Pulses:   2+ and symmetric all extremities   Skin:   Skin color, texture, turgor normal, no rashes or lesions   Lymph nodes:   Cervical, supraclavicular, and axillary nodes normal   Neurologic:   CNII-XII intact. Normal strength, sensation and reflexes       throughout         There are no Patient Instructions on file for this visit.    EKG: No significant changes.  Will have cardiology confirm this.    Labs:  Recent Results (from the past 24 hour(s))   Electrocardiogram Perform and Read    Collection Time: 03/08/19  1:26 PM   Result Value Ref Range    SYSTOLIC BLOOD PRESSURE  mmHg    DIASTOLIC BLOOD PRESSURE  mmHg    VENTRICULAR RATE 64 BPM    ATRIAL RATE 64 BPM    P-R INTERVAL 158 ms    QRS DURATION 90 ms    Q-T INTERVAL 410 ms    QTC CALCULATION (BEZET) 422 ms    P Axis 55 degrees    R AXIS -28 degrees    T AXIS 34 degrees    MUSE DIAGNOSIS       Normal sinus rhythm with sinus arrhythmia  Normal ECG  When compared with ECG of 20-JAN-2011 16:20,  Premature ventricular complexes are no longer Present  T wave inversion no longer evident in Inferior leads     HM1 (CBC with Diff)    Collection Time: 03/08/19  1:52 PM   Result Value Ref Range    WBC 5.5 4.0 - 11.0 thou/uL    RBC 4.44 3.80 - 5.40 mill/uL    Hemoglobin 13.4 12.0 - 16.0 g/dL     Hematocrit 41.2 35.0 - 47.0 %    MCV 93 80 - 100 fL    MCH 30.2 27.0 - 34.0 pg    MCHC 32.5 32.0 - 36.0 g/dL    RDW 12.7 11.0 - 14.5 %    Platelets 236 140 - 440 thou/uL    MPV 7.4 7.0 - 10.0 fL    Neutrophils % 65 50 - 70 %    Lymphocytes % 25 20 - 40 %    Monocytes % 8 2 - 10 %    Eosinophils % 2 0 - 6 %    Basophils % 0 0 - 2 %    Neutrophils Absolute 3.6 2.0 - 7.7 thou/uL    Lymphocytes Absolute 1.4 0.8 - 4.4 thou/uL    Monocytes Absolute 0.4 0.0 - 0.9 thou/uL    Eosinophils Absolute 0.1 0.0 - 0.4 thou/uL    Basophils Absolute 0.0 0.0 - 0.2 thou/uL       Immunization History   Administered Date(s) Administered     DT (pediatric) 12/11/2000, 07/29/2003     Hep A, historic 04/25/2006, 10/26/2006     IPV 04/25/2006     Meningococcal MPSV4, SQ 04/25/2006     Pneumo Conj 13-V (2010&after) 06/29/2015     Pneumo Polysac 23-V 08/30/2007     Td, Adult, Absorbed 07/29/2003     Tdap 05/30/2013     Typhoid Live, Oral 05/26/2009     Typhoid, ViCPs 04/25/2006     ZOSTER, LIVE 12/31/2009       Electronically signed by Brea Braswell, CNP 03/08/19 1:17 PM

## 2021-06-24 NOTE — PROGRESS NOTES
Assessment/Plan:    1. Viral upper respiratory tract infection  Rapid strep is negative today.  Will await results of throat culture.  I suspect symptoms are viral in nature.  Recommend supportive cares including adequate rest and hydration.  She may Take Tylenol or throat lozenges for symptom management.  Patient should return to clinic if she has any worsening symptoms including fevers, chills, body aches, productive cough.  She understands and is content with this plan.  - Rapid Strep A Screen-Throat  - Group A Strep, RNA Direct Detection, Throat    Subjective:    Alexandra Menezes is a 78-year-old female seen today for evaluation of a sore throat, nasal congestion and cough.  Symptoms developed this morning.  Patient thinks it is likely just a common cold however, she has surgery scheduled in 8 days and wanted to come in and be sure that it was not strep throat.  She does not have any known exposures to strep.  She denies any productive cough, shortness of breath, fevers, chills, night sweats, body aches.  Nasal congestion is causing a fullness feeling in her left ear.  Otherwise no ear pain or headaches.  She has been taking Tylenol which helps take the edge off.  She denies any other symptoms or concerns today. Review of systems is as stated in HPI, and the remainder of the 10 system review is otherwise unremarkable.    Past Medical History, Family History, and Social History reviewed.    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       cholecalciferol, vitamin D3, 1,000 unit tablet Take 1,000 Units by mouth daily.       latanoprost (XALATAN) 0.005 % ophthalmic solution INSTILL 1 DROP INTO BOTH EYES ONCE DAILY IN THE EVENING  3     levothyroxine (SYNTHROID, LEVOTHROID) 25 MCG tablet TAKE 1 TABLET BY MOUTH DAILY AT 6 AM 90 tablet 2     lisinopril (PRINIVIL,ZESTRIL) 10 MG tablet TAKE 1 TABLET BY MOUTH DAILY 90 tablet 2     moxifloxacin (VIGAMOX) 0.5 % ophthalmic solution   0     MULTIVIT &MINERALS/FERROUS FUM (MULTI VITAMIN ORAL) Take 1 tablet by mouth daily.       omega-3 fatty acids-vitamin E (FISH OIL) 1,000 mg cap Take by mouth daily.       prednisoLONE acetate (PRED-FORTE) 1 % ophthalmic suspension   1     propafenone (RYTHMOL) 225 MG tablet TAKE 1 TABLET BY MOUTH 3 TIMES A DAY. 270 tablet 2     No current facility-administered medications for this visit.           Objective:    Vitals:    03/13/19 1516   BP: 122/72   Pulse: 61   Temp: 97.6  F (36.4  C)   SpO2: 98%   Weight: 156 lb (70.8 kg)      Body mass index is 26.78 kg/m .      General Appearance:  Alert, afebrile, cooperative, no distress, appears stated age   HEENT:   Moist mucous membranes.  Oropharynx is clear without erythema.  Clear nasal drainage present.  Sinus tenderness with palpation.  Tympanic membranes and ear canals normal bilaterally.   Neck: Supple, symmetrical, no adenopathy.   Lungs:   Clear to auscultation bilaterally, respirations unlabored.  No  expiratory wheeze or inspiratory crackles noted.   Heart:  Regular rate and rhythm, S1, S2 normal, no murmur, rub or gallop   Extremities: Extremities normal.  No cyanosis or edema   Skin: Warm, dry.  Skin color, texture, turgor normal, no rashes or lesions       This note has been dictated using voice recognition software. Any grammatical or context distortions are unintentional and inherent to the use of this software.

## 2021-06-25 NOTE — ANESTHESIA CARE TRANSFER NOTE
Last vitals:   Vitals:    03/21/19 1046   BP: 93/50   Pulse: (!) 53   Resp: 10   Temp: 36.2  C (97.1  F)   SpO2: 97%     Patient's level of consciousness is awake  Spontaneous respirations: yes  Maintains airway independently: yes  Dentition unchanged: yes  Oropharynx: oropharynx clear of all foreign objects    QCDR Measures:  ASA# 20 - Surgical Safety Checklist: WHO surgical safety checklist completed prior to induction    PQRS# 430 - Adult PONV Prevention: 4558F - Pt received => 2 anti-emetic agents (different classes) preop & intraop  ASA# 8 - Peds PONV Prevention: NA - Not pediatric patient, not GA or 2 or more risk factors NOT present  PQRS# 424 - Mary-op Temp Management: 4559F - At least one body temp DOCUMENTED => 35.5C or 95.9F within required timeframe  PQRS# 426 - PACU Transfer Protocol: - Transfer of care checklist used  ASA# 14 - Acute Post-op Pain: ASA14B - Patient did NOT experience pain >= 7 out of 10

## 2021-06-25 NOTE — ANESTHESIA PROCEDURE NOTES
Peripheral Block    Patient location during procedure: pre-op  Start time: 3/21/2019 8:13 AM  End time: 3/21/2019 8:17 AM  post-op analgesia per surgeon order as noted in medical record  Staffing:  Performing  Anesthesiologist: Jeffery Aleman MD  Preanesthetic Checklist  Completed: patient identified, site marked, risks, benefits, and alternatives discussed, timeout performed, consent obtained, at patient's request, airway assessed, oxygen available, suction available, emergency drugs available and hand hygiene performed  Peripheral Block  Block type: saphenous  Prep: ChloraPrep  Patient position: left lateral decubitus  Patient monitoring: cardiac monitor, continuous pulse oximetry, blood pressure and heart rate  Laterality: left  Injection technique: ultrasound guided    Ultrasound used to visualize needle placement in proximity to nerve being blocked: yes   Permanent ultrasound image captured for medical record  Sterile gel and probe cover used for ultrasound.    Needle  Needle type: Stimuplex   Needle gauge: 21 G  Needle length: 4 in  no peripheral nerve catheter placed  Assessment  Injection assessment: negative aspiration for heme, no difficulty with injection, no paresthesia on injection and incremental injection

## 2021-06-25 NOTE — PROGRESS NOTES
Progress Notes by Aakash Smith MD at 1/10/2017 12:50 PM     Author: Aakash Smith MD Service: -- Author Type: Physician    Filed: 1/10/2017  1:08 PM Encounter Date: 1/10/2017 Status: Signed    : Aakash Smith MD (Physician)           Click to link to [facility]     [facility] CARE ELECTROPHYSIOLOGY NOTE    Today I had the opportunity to see  Alexandra Menezes for follow-up evaluation of frequent unifocal ventricular premature beats.      Assessment/Recommendations   Clinic Problem List:  1. Frequent unifocal premature ventricular contractions  Echo Stress Exercise   2. Essential hypertension with goal blood pressure less than 140/90  lisinopril (PRINIVIL,ZESTRIL) 5 MG tablet       Assessment:    Frequent unifocal ventricular premature beats with symptoms well suppressed on moderate dose propafenone  New diagnosis hypertension    Plan:  Start lisinopril 5 mg daily for hypertension, call in home blood pressures  Continue propafenone 225 mg 3 times a day  Stress cardiac echo with family history of heart disease and propafenone therapy  Follow up appointment:   Dr. Smith in 1 year         History of Present Illness     Alexandra Menezes is a 76 y.o. female with frequent ventricular premature beats previously associated with symptoms of fatigue and palpitations. In 2008 she had frequent ventricular premature beats, up to 17,000 over 24 hours.  PVC morphology was consistent with left ventricular outflow tract origin.  Rythmol 225 mg 3 times a day has been very successful in suppressing this ventricular ectopy. She is had no evidence for PVC-induced cardiomyopathy. Coronary angiography showed normal coronaries in December 2012.   She continues to take propafenone 225 mg 3 times per day.  She notes the that she is somewhat more fatigued when she misses a dose.  She denies symptoms of palpitations lightheadedness or syncope.  She swims regularly with no exertional chest pain or pressure.   She does have 2 brothers with coronary artery disease however.         Physical Examination Review of Systems   Vitals:    01/10/17 1245   BP: (!) 152/98   Pulse: 64   Resp: 16     Body mass index is 27.81 kg/(m^2).  Wt Readings from Last 3 Encounters:   01/10/17 162 lb (73.5 kg)   04/04/16 156 lb (70.8 kg)   01/21/16 157 lb 6.4 oz (71.4 kg)        Appearance:   no distress,   HEENT:   no scleral icterus, normal conjunctivae    Neck: no carotid bruits or thyromegaly   Chest/Lungs:   lungs are clear to auscultation, no rales or wheezing,    Cardiovascular:   Jugular venous pressure 4 cm, Apical pulse is quite regular. Normal S1,S2 with no murmurs or gallops,   Abdomen:  no  Hepatosplenomegaly., nontender,  bowel sounds are present   Extremities: no cyanosis or clubbing, No edema   Skin: no xanthelasma, warm.    Neurologic: No gross focal neurologic deficits   Mood/Affect: Alert, cooperative    General: WNL  Eyes: WNL  Ears/Nose/Throat: WNL  Lungs: WNL  Heart: WNL  Stomach: WNL  Bladder: WNL  Muscle/Joints: Joint Pain  Skin: WNL  Nervous System: WNL  Mental Health: WNL     Blood: WNL     Medical History  Surgical History Family History Social History   Past Medical History   Diagnosis Date   ? Arrhythmia      take propafenone   ? 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 Mastectomy Right 1992    Family History   Problem Relation Age of Onset   ? Rheum arthritis Mother    ? Heart disease Brother    ? CABG Brother    ? Heart disease Maternal Grandmother    ? Valvular heart disease Father    ? Hyperthyroidism Sister    ? Heart disease Sister     Social History     Social History   ? Marital status:      Spouse name: N/A   ? Number of children: N/A   ? Years of education: N/A     Occupational History   ? Not on file.     Social History Main Topics   ? Smoking status: Never Smoker   ? Smokeless tobacco: Never Used   ? Alcohol use 1.2 oz/week     2 Glasses  of wine per week   ? Drug use: No   ? Sexual activity: No     Other Topics Concern   ? Not on file     Social History Narrative          Medications  Allergies   Current Outpatient Prescriptions   Medication Sig Dispense Refill   ? betamethasone dipropionate (DIPROLENE) 0.05 % cream Apply to affected area sparingly twice daily 45 g 0   ? CALCIUM CARBONATE (CALCIUM 600 ORAL) Take 1 capsule by mouth 2 (two) times a day.     ? cholecalciferol, vitamin D3, 1,000 unit tablet Take 1,000 Units by mouth daily.     ? levothyroxine (SYNTHROID, LEVOTHROID) 25 MCG tablet TAKE ONE TABLET BY MOUTH ONE TIME DAILY 90 tablet 3   ? MULTIVIT &MINERALS/FERROUS FUM (MULTI VITAMIN ORAL) Take 1 tablet by mouth daily.     ? omega-3 fatty acids-vitamin E (FISH OIL) 1,000 mg cap Take by mouth daily.     ? propafenone (RYTHMOL) 225 MG tablet Take 1 tablet (225 mg total) by mouth 3 (three) times a day. Generic propafenone 270 tablet 3   ? lisinopril (PRINIVIL,ZESTRIL) 5 MG tablet Take 1 tablet (5 mg total) by mouth daily. 90 tablet 3     No current facility-administered medications for this visit.       No Known Allergies

## 2021-06-25 NOTE — ANESTHESIA PREPROCEDURE EVALUATION
Anesthesia Evaluation      Patient summary reviewed   No history of anesthetic complications     Airway   Mallampati: II  Neck ROM: full   Pulmonary - negative ROS and normal exam                          Cardiovascular - normal exam  (+) hypertension, dysrhythmias, , hypercholesterolemia,        ROS comment: Sinus bradycardia, propafenone; Frequent PVCs.     Neuro/Psych - negative ROS     Endo/Other    (+) hypothyroidism, arthritis,      Comments: Breast cancer s/p mastectomy.    GI/Hepatic/Renal            Dental - normal exam                        Anesthesia Plan  Planned anesthetic: spinal and peripheral nerve block  Tibial and saphenous nerve blocks for POP per surgeon request.  Decadron, Zofran.  Diprivan infusion.  Ketamine (0.5 mg/kg).  ASA 2     Anesthetic plan and risks discussed with: patient  Anesthesia plan special considerations: antiemetics,   Post-op plan: routine recovery

## 2021-06-25 NOTE — ANESTHESIA POSTPROCEDURE EVALUATION
Patient: Alexandra Menezes  LEFT TOTAL KNEE ARTHROPLASTY,COMPUTER-ASSIST  Anesthesia type: spinal    Patient location: PACU  Last vitals:   Vitals:    03/21/19 1100   BP: 104/54   Pulse: (!) 52   Resp: 16   Temp:    SpO2: 99%     Post vital signs: stable  Level of consciousness: awake and responds to simple questions  Post-anesthesia pain: pain controlled  Post-anesthesia nausea and vomiting: no  Pulmonary: unassisted, return to baseline  Cardiovascular: stable and blood pressure at baseline  Hydration: adequate  Anesthetic events: no    QCDR Measures:  ASA# 11 - Mary-op Cardiac Arrest: ASA11B - Patient did NOT experience unanticipated cardiac arrest  ASA# 12 - Mary-op Mortality Rate: ASA12B - Patient did NOT die  ASA# 13 - PACU Re-Intubation Rate: NA - No ETT / LMA used for case  ASA# 10 - Composite Anes Safety: ASA10A - No serious adverse event    Additional Notes: doing well, no complaints

## 2021-06-25 NOTE — ANESTHESIA PROCEDURE NOTES
Peripheral Block    Patient location during procedure: pre-op  Start time: 3/21/2019 8:05 AM  End time: 3/21/2019 8:13 AM  post-op analgesia per surgeon order as noted in medical record  Staffing:  Performing  Anesthesiologist: Jeffery Aleman MD  Preanesthetic Checklist  Completed: patient identified, site marked, risks, benefits, and alternatives discussed, timeout performed, consent obtained, at patient's request, airway assessed, oxygen available, suction available, emergency drugs available and hand hygiene performed  Peripheral Block  Block type: other, tibial  Prep: ChloraPrep  Patient position: supine  Patient monitoring: cardiac monitor, continuous pulse oximetry, blood pressure and heart rate  Laterality: left  Injection technique: ultrasound guided    Ultrasound used to visualize needle placement in proximity to nerve being blocked: yes   Permanent ultrasound image captured for medical record  Sterile gel and probe cover used for ultrasound.    Needle  Needle type: Stimuplex   Needle gauge: 21 G  Needle length: 4 in  no peripheral nerve catheter placed  Assessment  Injection assessment: negative aspiration for heme, no difficulty with injection, no paresthesia on injection and incremental injection

## 2021-06-25 NOTE — TELEPHONE ENCOUNTER
Talked to patient about scheduling surgery with Dr. Terrell. She wants to take some more time to consider if she would like to proceed. She also would not want to schedule until later in the fall.    She will call back if she has any questions and when she decides to schedule.

## 2021-06-25 NOTE — ANESTHESIA PROCEDURE NOTES
Spinal Block    Patient location during procedure: OR  Start time: 3/21/2019 8:51 AM  End time: 3/21/2019 8:55 AM  Reason for block: primary anesthetic    Staffing:  Performing  Anesthesiologist: Jeffery Aleman MD    Preanesthetic Checklist  Completed: patient identified, risks, benefits, and alternatives discussed, timeout performed, consent obtained, airway assessed, oxygen available, suction available, emergency drugs available and hand hygiene performed  Spinal Block  Patient position: sitting  Prep: ChloraPrep  Patient monitoring: heart rate, cardiac monitor, continuous pulse ox and blood pressure  Approach: midline  Location: L3-4  Injection technique: single-shot  Needle type: pencil-tip   Needle gauge: 24 G

## 2021-06-26 ENCOUNTER — HEALTH MAINTENANCE LETTER (OUTPATIENT)
Age: 81
End: 2021-06-26

## 2021-06-26 NOTE — PROGRESS NOTES
Progress Notes by Aakash Smith MD at 4/4/2018  1:50 PM     Author: Aakash Smith MD Service: -- Author Type: Physician    Filed: 4/4/2018  4:14 PM Encounter Date: 4/4/2018 Status: Signed    : Aakash Smith MD (Physician)           Click to link to [facility]     [facility] HEART [facility] ELECTROPHYSIOLOGY NOTE    Today I had the opportunity to see  Alexandra Menezes for follow-up evaluation of frequent unifocal ventricular premature beats.      Assessment/Recommendations   Clinic Problem List:  1. Frequent unifocal premature ventricular contractions     2. Essential hypertension         Assessment:    Frequent unifocal ventricular premature beats, previously associated with symptoms of fatigue.  Likely originating near the left posterior lateral papillary muscle.  No evidence for structural heart disease.  Excellent suppression on moderate dose propafenone  Hypertension, suboptimally controlled    Plan:  Continue propafenone  Start taking lisinopril 10 mg each day  Please call in some home blood pressure readings  Follow up appointment:   Dr. Smith in 1 year       History of Present Illness     Alexandra Menezes is a 77 y.o. female with frequent ventricular premature beats previously associated with symptoms of fatigue and palpitations. In 2008 she had frequent ventricular premature beats, up to 17,000 over 24 hours.  PVC morphology was consistent with left ventricular origin probably the posterior papillary muscle.  Rythmol 225 mg 3 times a day has been very successful in suppressing this ventricular ectopy. She has had no evidence for PVC-induced cardiomyopathy. Coronary angiography showed normal coronaries in December 2012.  Stress echo 1/17/2017 showed normal left ventricular size and function and no evidence for inducible ischemia.  She continues to feel well without exertional chest pain or dyspnea on exertion.  Home blood pressures have been running 130-150 systolic.  She denies  sensation of palpitations but sometimes noted some excessive fatigue when she misses a dose of propafenone..Personally reviewed.  Stress test above       Physical Examination Review of Systems   Vitals:    04/04/18 1403   BP: 140/80   Pulse: 60   Resp: 16     Body mass index is 26.61 kg/(m^2).  Wt Readings from Last 3 Encounters:   04/04/18 155 lb (70.3 kg)   07/24/17 153 lb (69.4 kg)   07/03/17 157 lb 11.2 oz (71.5 kg)        Appearance:   no distress,    HEENT:   no scleral icterus, normal conjunctivae    Neck: no carotid bruits or thyromegaly   Chest/Lungs:   lungs are clear to auscultation, no rales or wheezing,    Cardiovascular:   Jugular venous pressure 5 cm, Apical pulse is 64 and quite regular. Normal S1,S2 with no murmurs or gallops,   Abdomen:  no  Hepatosplenomegaly., nontender,  bowel sounds are present   Extremities: no cyanosis or clubbing, No edema   Skin: no xanthelasma, warm.    Neurologic: No gross focal neurologic deficits   Mood/Affect: Alert, cooperative    General: WNL  Eyes: WNL  Ears/Nose/Throat: WNL  Lungs: WNL  Heart: WNL  Stomach: WNL  Bladder: WNL  Muscle/Joints: WNL  Skin: WNL  Nervous System: WNL  Mental Health: WNL     Blood: WNL     Medical History  Surgical History Family History Social History   Past Medical History:   Diagnosis Date   ? Arrhythmia     take propafenone   ? Hypertension    ? 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 MASTECTOMY Right 1992    Family History   Problem Relation Age of Onset   ? Rheum arthritis Mother    ? Heart disease Brother    ? CABG Brother    ? Heart disease Maternal Grandmother    ? Valvular heart disease Father    ? Hyperthyroidism Sister    ? Heart disease Sister     Social History     Social History   ? Marital status:      Spouse name: N/A   ? Number of children: N/A   ? Years of education: N/A     Occupational History   ? Not on file.     Social History Main Topics   ?  Smoking status: Never Smoker   ? Smokeless tobacco: Never Used   ? Alcohol use 1.2 oz/week     2 Glasses of wine per week   ? Drug use: No   ? Sexual activity: No     Other Topics Concern   ? Not on file     Social History Narrative          Medications  Allergies   Current Outpatient Prescriptions   Medication Sig Dispense Refill   ? betamethasone dipropionate (DIPROLENE) 0.05 % cream Apply to affected area sparingly twice daily (Patient taking differently: As needed) 45 g 0   ? CALCIUM CARBONATE (CALCIUM 600 ORAL) Take 1 capsule by mouth 2 (two) times a day.     ? cholecalciferol, vitamin D3, 1,000 unit tablet Take 1,000 Units by mouth daily.     ? levothyroxine (SYNTHROID, LEVOTHROID) 25 MCG tablet TAKE ONE TABLET BY MOUTH ONE TIME DAILY 90 tablet 2   ? lisinopril (PRINIVIL,ZESTRIL) 5 MG tablet TAKE 1 TABLET BY MOUTH DAILY. 90 tablet 0   ? MULTIVIT &MINERALS/FERROUS FUM (MULTI VITAMIN ORAL) Take 1 tablet by mouth daily.     ? omega-3 fatty acids-vitamin E (FISH OIL) 1,000 mg cap Take by mouth daily.     ? propafenone (RYTHMOL) 225 MG tablet Take 1 tablet (225 mg total) by mouth 3 (three) times a day. Generic propafenone 270 tablet 3     No current facility-administered medications for this visit.       No Known Allergies

## 2021-06-28 NOTE — PROGRESS NOTES
Progress Notes by Aakash Smith MD at 10/2/2019 10:50 AM     Author: Aakash Smith MD Service: -- Author Type: Physician    Filed: 10/2/2019 11:14 AM Encounter Date: 10/2/2019 Status: Signed    : Aakash Smith MD (Physician)           Click to link to [facility] ELECTROPHYSIOLOGY NOTE    Today I had the opportunity to see  Alexandra Menezes for follow-up evaluation of frequent unifocal ventricular premature beats and hypertension.      Assessment/Recommendations   Clinic Problem List:  1. Frequent unifocal premature ventricular contractions  Holter Monitor   2. Essential hypertension     3. PVC's (premature ventricular contractions)  propafenone (RYTHMOL) 225 MG tablet       Assessment:    Frequent unifocal ventricular premature beats with excellent suppression on moderate dose propafenone.  Trial of propafenone 225 mg twice daily is recommended.  Hypertension is well controlled    Plan:  Decrease propafenone to 1 pill twice daily  Holter monitor in 1 week  Can increase propafenone to 1 pill 3 times daily if symptoms recur  Follow up appointment:   Dr. Smith in 1 year       History of Present Illness     Alexandra Menezes is a 79 y.o. female with  frequent ventricular premature beats previously associated with symptoms of fatigue and palpitations. In 2008 she had frequent ventricular premature beats, up to 17,000 over 24 hours.  PVC morphology was consistent with left ventricular origin probably the posterior papillary muscle.  Rythmol 225 mg 3 times a day has been very successful in suppressing this ventricular ectopy. She has had no evidence for PVC-induced cardiomyopathy. Coronary angiography showed normal coronaries in December 2012.  Stress echo 1/17/2017 showed normal left ventricular size and function and no evidence for inducible ischemia.    Alexandra reports feeling well.  She noted one episode of transient palpitations lasting a second or 2 without  lightheadedness or presyncope.  She describes good exercise tolerance without dyspnea on exertion or chest pain.  She does note on occasion when she misses her afternoon dose she has more fatigue but is unaware of any palpitations.         Physical Examination Review of Systems   Vitals:    10/02/19 1056   BP: 116/60   Pulse: (!) 54   Resp: 16   SpO2: 98%     Body mass index is 26.8 kg/m .  Wt Readings from Last 3 Encounters:   10/02/19 156 lb 2 oz (70.8 kg)   04/30/19 158 lb 6.4 oz (71.8 kg)   03/21/19 153 lb 6.4 oz (69.6 kg)        Appearance:   no distress,    HEENT:   no scleral icterus, normal conjunctivae    Neck: no carotid bruits or thyromegaly   Chest/Lungs:   lungs are clear to auscultation, no rales or wheezing,    Cardiovascular:   Jugular venous pressure 4 cm, Apical pulse is quite regular at 52 bpm. Normal S1,S2 with soft grade 1/6 systolic ejection murmur,   Abdomen:  no  Hepatosplenomegaly., nontender,  bowel sounds are present   Extremities: no cyanosis or clubbing, No edema   Skin: no xanthelasma, warm.    Neurologic: No gross focal neurologic deficits   Mood/Affect: Alert, cooperative    General: WNL  Eyes: WNL  Ears/Nose/Throat: WNL  Lungs: WNL  Heart: WNL  Stomach: WNL  Bladder: WNL  Muscle/Joints: WNL  Skin: WNL  Nervous System: WNL  Mental Health: WNL     Blood: WNL     Medical History  Surgical History Family History Social History   Past Medical History:   Diagnosis Date   ? Arrhythmia     take propafenone   ? Arthritis    ? Breast cancer (H) 1992    right Mastectomy   ? CKD (chronic kidney disease) stage 3, GFR 30-59 ml/min (H) 3/21/2019   ? Disease of thyroid gland     hypothyroid   ? Hypercholesterolemia    ? Hypertension    ? 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   ? Sinus bradycardia     frequent PVC's   ? 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 BREAST SURGERY Right 1992    Mastectomy for Ca   ? CATARACT  EXTRACTION W/  INTRAOCULAR LENS IMPLANT Bilateral    ? MASTECTOMY Right 1992   ? REPLACEMENT TOTAL KNEE Left 3/21/2019    Procedure: LEFT TOTAL KNEE ARTHROPLASTY,COMPUTER-ASSIST;  Surgeon: Fede Robert MD;  Location: [facility] Main OR;  Service: Orthopedics    Family History   Problem Relation Age of Onset   ? Rheum arthritis Mother    ? Heart disease Brother    ? CABG Brother    ? Heart disease Maternal Grandmother    ? Valvular heart disease Father    ? Hyperthyroidism Sister    ? Heart disease Sister    ? Clotting disorder Neg Hx     Social History     Socioeconomic History   ? Marital status:      Spouse name: Not on file   ? Number of children: Not on file   ? Years of education: Not on file   ? Highest education level: Not on file   Occupational History   ? Not on file   Social Needs   ? Financial resource strain: Not on file   ? Food insecurity:     Worry: Not on file     Inability: Not on file   ? Transportation needs:     Medical: Not on file     Non-medical: Not on file   Tobacco Use   ? Smoking status: Never Smoker   ? Smokeless tobacco: Never Used   Substance and Sexual Activity   ? Alcohol use: Yes     Alcohol/week: 2.0 standard drinks     Types: 2 Glasses of wine per week   ? Drug use: No   ? Sexual activity: Never   Lifestyle   ? Physical activity:     Days per week: Not on file     Minutes per session: Not on file   ? Stress: Not on file   Relationships   ? Social connections:     Talks on phone: Not on file     Gets together: Not on file     Attends Latter-day service: Not on file     Active member of club or organization: Not on file     Attends meetings of clubs or organizations: Not on file     Relationship status: Not on file   ? Intimate partner violence:     Fear of current or ex partner: Not on file     Emotionally abused: Not on file     Physically abused: Not on file     Forced sexual activity: Not on file   Other Topics Concern   ? Not on file   Social History Narrative   ? Not  on file          Medications  Allergies   Current Outpatient Medications   Medication Sig Dispense Refill   ? acetaminophen (TYLENOL) 500 MG tablet Take 2 tablets (1,000 mg total) by mouth 3 (three) times a day.  0   ? calcium carbonate-vitamin D3 (CALCIUM 600 + D,3,) 600 mg(1,500mg) -400 unit per tablet Take 1 tablet by mouth 2 (two) times a day.     ? cholecalciferol, vitamin D3, 1,000 unit tablet Take 1,000 Units by mouth daily.     ? latanoprost (XALATAN) 0.005 % ophthalmic solution INSTILL 1 DROP INTO BOTH EYES ONCE DAILY IN THE EVENING  3   ? levothyroxine (SYNTHROID, LEVOTHROID) 25 MCG tablet TAKE 1 TABLET BY MOUTH DAILY AT 6 AM 90 tablet 1   ? lisinopril (PRINIVIL,ZESTRIL) 10 MG tablet TAKE 1 TABLET BY MOUTH EVERY DAY 90 tablet 0   ? omega-3 fatty acids-fish oil (OMEGA 3 FISH OIL) 684-1,200 mg CpDR Take 2 tablets by mouth every morning.     ? propafenone (RYTHMOL) 225 MG tablet Take 1 tablet (225 mg total) by mouth 2 (two) times a day. 270 tablet 2   ? betamethasone dipropionate (DIPROLENE) 0.05 % cream Apply to affected area sparingly twice daily (Patient taking differently: As needed) 45 g 0   ? imiquimod (ALDARA) 5 % cream Apply 1 application topically 2 (two) times a day.     ? mupirocin (BACTROBAN) 2 % ointment Apply 1 application topically 3 (three) times a day as needed.       No current facility-administered medications for this visit.       No Known Allergies

## 2021-06-29 NOTE — PROGRESS NOTES
Progress Notes by Aakash Smith MD at 11/6/2020 11:30 AM     Author: Aakash Smith MD Service: -- Author Type: Physician    Filed: 11/6/2020 12:10 PM Encounter Date: 11/6/2020 Status: Signed    : Aakash Smith MD (Physician)                ELECTROPHYSIOLOGY NOTE    Today I had the opportunity to see  Alexandra Menezes for follow-up evaluation of frequent unifocal ventricular premature beats.      Assessment/Recommendations   Clinic Problem List:  1. Frequent unifocal premature ventricular contractions  Echo Stress Exercise    propafenone (RYTHMOL) 150 MG tablet   2. Essential hypertension     3. PVC's (premature ventricular contractions)  DISCONTINUED: propafenone (RYTHMOL) 150 MG tablet   4. Abnormal electrocardiogram (ECG) (EKG)   Echo Stress Exercise       Assessment:    Frequent unifocal ventricular premature beats extremely well controlled on propafenone 225 mg twice daily.  Empiric dose reduction to 150 mg twice daily given very mild renal insufficiency.  Stress echo is ordered in 1 year to confirm absence of coronary artery disease.    Plan:  Start taking propafenone 150 mg twice daily.  If palpitations increase she can go back to 150 mg 3 times daily or restart propafenone 225 mg twice daily  A cardiac echo has been ordered in 1 year  Follow up appointment:   Dr. Smith 1 week after echo         History of Present Illness     Alexandra Menezes is a 80 y.o. female with  frequent ventricular premature beats previously associated with symptoms of fatigue and palpitations. In 2008 she had frequent ventricular premature beats, up to 17,000 over 24 hours.  PVC morphology was consistent with left ventricular origin probably the posterior papillary muscle.  Rythmol 225 mg 3 times a day has been very successful in suppressing this ventricular ectopy. She has had no evidence for PVC-induced cardiomyopathy. Coronary angiography showed normal coronaries in December 2012.  Stress echo 1/17/2017 showed  normal left ventricular size and function and no evidence for inducible ischemia.  Propafenone was decreased to 225 mg twice daily from 3 times daily on October 2, 2019.  Holter monitor several days later showed only 4 PVCs.  Heart rate is feeling quite well.  She swims several times per week without chest pains or shortness of breath.  She is unaware of any palpitations.  She checks her pulse and generally finds it quite regular.  There is no syncope or presyncope.    Personally reviewed.  BUN was 24 creatinine 1.2 on June 30, 2020       Physical Examination Review of Systems   Vitals:    11/06/20 1140   BP: 120/70   Pulse: 66   Resp: 12     Body mass index is 27.64 kg/m .  Wt Readings from Last 3 Encounters:   11/06/20 161 lb (73 kg)   10/02/19 156 lb 2 oz (70.8 kg)   04/30/19 158 lb 6.4 oz (71.8 kg)        Appearance:   no distress,    HEENT:   no scleral icterus, normal conjunctivae    Neck: no carotid bruits or thyromegaly   Chest/Lungs:   lungs are clear to auscultation, no rales or wheezing,    Cardiovascular:   Jugular venous pressure 5 cm, Apical pulse is very regular at 64 bpm. Normal S1,S2 with no murmurs or gallops,   Abdomen:  no  Hepatosplenomegaly., nontender,  bowel sounds are present   Extremities: no cyanosis or clubbing, No edema   Skin: no xanthelasma, warm.    Neurologic: No gross focal neurologic deficits   Mood/Affect: Alert, cooperative    General: WNL  Eyes: WNL  Ears/Nose/Throat: WNL  Lungs: WNL  Heart: WNL  Stomach: WNL  Bladder: WNL  Muscle/Joints: Joint Pain  Skin: WNL  Nervous System: WNL  Mental Health: Anxiety     Blood: WNL     Medical History  Surgical History Family History Social History   Past Medical History:   Diagnosis Date   ? Arrhythmia     take propafenone   ? Arthritis    ? Breast cancer (H) 1992    right Mastectomy   ? CKD (chronic kidney disease) stage 3, GFR 30-59 ml/min 3/21/2019   ? Disease of thyroid gland     hypothyroid   ? Hypercholesterolemia    ? Hypertension     ? 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   ? Sinus bradycardia     frequent PVC's   ? 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 BREAST SURGERY Right 1992    Mastectomy for Ca   ? CATARACT EXTRACTION W/  INTRAOCULAR LENS IMPLANT Bilateral    ? MASTECTOMY Right 1992   ? REPLACEMENT TOTAL KNEE Left 3/21/2019    Procedure: LEFT TOTAL KNEE ARTHROPLASTY,COMPUTER-ASSIST;  Surgeon: Fede Robert MD;  Location: [facility] Main OR;  Service: Orthopedics    Family History   Problem Relation Age of Onset   ? Rheum arthritis Mother    ? Heart disease Brother    ? CABG Brother    ? Heart disease Maternal Grandmother    ? Valvular heart disease Father    ? Hyperthyroidism Sister    ? Heart disease Sister    ? Clotting disorder Neg Hx     Social History     Socioeconomic History   ? Marital status:      Spouse name: Not on file   ? Number of children: Not on file   ? Years of education: Not on file   ? Highest education level: Not on file   Occupational History   ? Not on file   Social Needs   ? Financial resource strain: Not on file   ? Food insecurity     Worry: Not on file     Inability: Not on file   ? Transportation needs     Medical: Not on file     Non-medical: Not on file   Tobacco Use   ? Smoking status: Never Smoker   ? Smokeless tobacco: Never Used   Substance and Sexual Activity   ? Alcohol use: Yes     Alcohol/week: 2.0 standard drinks     Types: 2 Glasses of wine per week   ? Drug use: No   ? Sexual activity: Never   Lifestyle   ? Physical activity     Days per week: Not on file     Minutes per session: Not on file   ? Stress: Not on file   Relationships   ? Social connections     Talks on phone: Not on file     Gets together: Not on file     Attends Anabaptism service: Not on file     Active member of club or organization: Not on file     Attends meetings of clubs or organizations: Not on file     Relationship status: Not on file    ? Intimate partner violence     Fear of current or ex partner: Not on file     Emotionally abused: Not on file     Physically abused: Not on file     Forced sexual activity: Not on file   Other Topics Concern   ? Not on file   Social History Narrative   ? Not on file          Medications  Allergies   Current Outpatient Medications   Medication Sig Dispense Refill   ? acetaminophen (TYLENOL) 500 MG tablet Take 2 tablets (1,000 mg total) by mouth 3 (three) times a day.  0   ? calcium carbonate-vitamin D3 (CALCIUM 600 + D,3,) 600 mg(1,500mg) -400 unit per tablet Take 1 tablet by mouth 2 (two) times a day.     ? cholecalciferol, vitamin D3, 1,000 unit tablet Take 1,000 Units by mouth daily.     ? latanoprost (XALATAN) 0.005 % ophthalmic solution INSTILL 1 DROP INTO BOTH EYES ONCE DAILY IN THE EVENING  3   ? levothyroxine (SYNTHROID, LEVOTHROID) 25 MCG tablet TAKE 1 TABLET BY MOUTH DAILY AT 6 AM 90 tablet 3   ? lisinopriL (PRINIVIL,ZESTRIL) 10 MG tablet TAKE 1 TABLET BY MOUTH EVERY DAY 90 tablet 0   ? omega-3 fatty acids-fish oil (OMEGA 3 FISH OIL) 684-1,200 mg CpDR Take 2 tablets by mouth every morning.     ? propafenone (RYTHMOL) 150 MG tablet Take 1 tablet (150 mg total) by mouth 2 (two) times a day. 180 tablet 3     No current facility-administered medications for this visit.       No Known Allergies

## 2021-07-06 VITALS
DIASTOLIC BLOOD PRESSURE: 70 MMHG | WEIGHT: 160 LBS | SYSTOLIC BLOOD PRESSURE: 120 MMHG | OXYGEN SATURATION: 98 % | TEMPERATURE: 98.2 F | BODY MASS INDEX: 27.31 KG/M2 | HEART RATE: 72 BPM | HEIGHT: 64 IN

## 2021-07-13 ENCOUNTER — RECORDS - HEALTHEAST (OUTPATIENT)
Dept: ADMINISTRATIVE | Facility: CLINIC | Age: 81
End: 2021-07-13

## 2021-07-14 DIAGNOSIS — I10 HTN (HYPERTENSION): ICD-10-CM

## 2021-07-14 DIAGNOSIS — E03.9 HYPOTHYROIDISM: ICD-10-CM

## 2021-07-14 RX ORDER — LISINOPRIL 10 MG/1
10 TABLET ORAL DAILY
Qty: 90 TABLET | Refills: 1 | Status: SHIPPED | OUTPATIENT
Start: 2021-07-14 | End: 2022-01-11

## 2021-07-17 RX ORDER — LEVOTHYROXINE SODIUM 25 UG/1
TABLET ORAL
Qty: 90 TABLET | Refills: 2 | Status: SHIPPED | OUTPATIENT
Start: 2021-07-17 | End: 2022-04-08

## 2021-07-17 NOTE — TELEPHONE ENCOUNTER
"Last Written Prescription Date:  7/9/20  Last Fill Quantity: 90,  # refills: 3   Last office visit provider:  3/24/21     Requested Prescriptions   Pending Prescriptions Disp Refills     levothyroxine (SYNTHROID/LEVOTHROID) 25 MCG tablet [Pharmacy Med Name: LEVOTHYROXINE 25 MCG TABLET] 90 tablet 3     Sig: TAKE 1 TABLET BY MOUTH DAILY AT 6 AM       Thyroid Protocol Passed - 7/14/2021 12:29 AM        Passed - Patient is 12 years or older        Passed - Recent (12 mo) or future (30 days) visit within the authorizing provider's specialty     Patient has had an office visit with the authorizing provider or a provider within the authorizing providers department within the previous 12 mos or has a future within next 30 days. See \"Patient Info\" tab in inbasket, or \"Choose Columns\" in Meds & Orders section of the refill encounter.              Passed - Medication is active on med list        Passed - Normal TSH on file in past 12 months     Recent Labs   Lab Test 03/24/21  1101   TSH 3.15              Passed - No active pregnancy on record     If patient is pregnant or has had a positive pregnancy test, please check TSH.          Passed - No positive pregnancy test in past 12 months     If patient is pregnant or has had a positive pregnancy test, please check TSH.               mariely pablo RN 07/17/21 4:35 PM  "

## 2021-07-23 ENCOUNTER — RECORDS - HEALTHEAST (OUTPATIENT)
Dept: ADMINISTRATIVE | Facility: CLINIC | Age: 81
End: 2021-07-23

## 2021-09-03 ENCOUNTER — APPOINTMENT (OUTPATIENT)
Dept: RADIOLOGY | Facility: HOSPITAL | Age: 81
DRG: 563 | End: 2021-09-03
Attending: EMERGENCY MEDICINE
Payer: COMMERCIAL

## 2021-09-03 ENCOUNTER — HOSPITAL ENCOUNTER (INPATIENT)
Facility: HOSPITAL | Age: 81
LOS: 1 days | Discharge: HOME | DRG: 563 | End: 2021-09-05
Attending: EMERGENCY MEDICINE | Admitting: HOSPITALIST
Payer: COMMERCIAL

## 2021-09-03 DIAGNOSIS — S01.81XA FACIAL LACERATION, INITIAL ENCOUNTER: ICD-10-CM

## 2021-09-03 DIAGNOSIS — S22.32XA CLOSED FRACTURE OF ONE RIB OF LEFT SIDE, INITIAL ENCOUNTER: ICD-10-CM

## 2021-09-03 DIAGNOSIS — S42.352A CLOSED DISPLACED COMMINUTED FRACTURE OF SHAFT OF LEFT HUMERUS, INITIAL ENCOUNTER: ICD-10-CM

## 2021-09-03 LAB
ANION GAP SERPL CALCULATED.3IONS-SCNC: 9 MMOL/L (ref 5–18)
BUN SERPL-MCNC: 24 MG/DL (ref 8–28)
CALCIUM SERPL-MCNC: 9.6 MG/DL (ref 8.5–10.5)
CHLORIDE BLD-SCNC: 108 MMOL/L (ref 98–107)
CO2 SERPL-SCNC: 22 MMOL/L (ref 22–31)
CREAT SERPL-MCNC: 1.23 MG/DL (ref 0.6–1.1)
ERYTHROCYTE [DISTWIDTH] IN BLOOD BY AUTOMATED COUNT: 13.6 % (ref 10–15)
GFR SERPL CREATININE-BSD FRML MDRD: 42 ML/MIN/1.73M2
GLUCOSE BLD-MCNC: 107 MG/DL (ref 70–125)
HCT VFR BLD AUTO: 40.5 % (ref 35–47)
HGB BLD-MCNC: 12.9 G/DL (ref 11.7–15.7)
INR PPP: 0.97 (ref 0.9–1.15)
MCH RBC QN AUTO: 29.7 PG (ref 26.5–33)
MCHC RBC AUTO-ENTMCNC: 31.9 G/DL (ref 31.5–36.5)
MCV RBC AUTO: 93 FL (ref 78–100)
PLATELET # BLD AUTO: 208 10E3/UL (ref 150–450)
POTASSIUM BLD-SCNC: 4.7 MMOL/L (ref 3.5–5)
RBC # BLD AUTO: 4.35 10E6/UL (ref 3.8–5.2)
SARS-COV-2 RNA RESP QL NAA+PROBE: NEGATIVE
SODIUM SERPL-SCNC: 139 MMOL/L (ref 136–145)
WBC # BLD AUTO: 11.1 10E3/UL (ref 4–11)

## 2021-09-03 PROCEDURE — 71045 X-RAY EXAM CHEST 1 VIEW: CPT

## 2021-09-03 PROCEDURE — G0378 HOSPITAL OBSERVATION PER HR: HCPCS

## 2021-09-03 PROCEDURE — 80048 BASIC METABOLIC PNL TOTAL CA: CPT | Performed by: EMERGENCY MEDICINE

## 2021-09-03 PROCEDURE — 85027 COMPLETE CBC AUTOMATED: CPT | Performed by: EMERGENCY MEDICINE

## 2021-09-03 PROCEDURE — 36415 COLL VENOUS BLD VENIPUNCTURE: CPT | Performed by: EMERGENCY MEDICINE

## 2021-09-03 PROCEDURE — 250N000009 HC RX 250: Performed by: EMERGENCY MEDICINE

## 2021-09-03 PROCEDURE — 250N000011 HC RX IP 250 OP 636: Performed by: HOSPITALIST

## 2021-09-03 PROCEDURE — 96374 THER/PROPH/DIAG INJ IV PUSH: CPT

## 2021-09-03 PROCEDURE — 99285 EMERGENCY DEPT VISIT HI MDM: CPT | Mod: 25

## 2021-09-03 PROCEDURE — 85610 PROTHROMBIN TIME: CPT | Performed by: EMERGENCY MEDICINE

## 2021-09-03 PROCEDURE — C9803 HOPD COVID-19 SPEC COLLECT: HCPCS

## 2021-09-03 PROCEDURE — 12011 RPR F/E/E/N/L/M 2.5 CM/<: CPT

## 2021-09-03 PROCEDURE — 0HQ1XZZ REPAIR FACE SKIN, EXTERNAL APPROACH: ICD-10-PCS | Performed by: EMERGENCY MEDICINE

## 2021-09-03 PROCEDURE — 250N000013 HC RX MED GY IP 250 OP 250 PS 637: Performed by: HOSPITALIST

## 2021-09-03 PROCEDURE — 99220 PR INITIAL OBSERVATION CARE,LEVEL III: CPT | Performed by: HOSPITALIST

## 2021-09-03 PROCEDURE — 250N000013 HC RX MED GY IP 250 OP 250 PS 637: Performed by: EMERGENCY MEDICINE

## 2021-09-03 PROCEDURE — 87635 SARS-COV-2 COVID-19 AMP PRB: CPT | Performed by: EMERGENCY MEDICINE

## 2021-09-03 PROCEDURE — 96375 TX/PRO/DX INJ NEW DRUG ADDON: CPT

## 2021-09-03 PROCEDURE — 96372 THER/PROPH/DIAG INJ SC/IM: CPT | Performed by: HOSPITALIST

## 2021-09-03 PROCEDURE — 250N000011 HC RX IP 250 OP 636: Performed by: EMERGENCY MEDICINE

## 2021-09-03 PROCEDURE — 73060 X-RAY EXAM OF HUMERUS: CPT | Mod: LT

## 2021-09-03 RX ORDER — BISACODYL 10 MG
10 SUPPOSITORY, RECTAL RECTAL DAILY PRN
Status: DISCONTINUED | OUTPATIENT
Start: 2021-09-03 | End: 2021-09-05 | Stop reason: HOSPADM

## 2021-09-03 RX ORDER — NALOXONE HYDROCHLORIDE 0.4 MG/ML
0.4 INJECTION, SOLUTION INTRAMUSCULAR; INTRAVENOUS; SUBCUTANEOUS
Status: DISCONTINUED | OUTPATIENT
Start: 2021-09-03 | End: 2021-09-05 | Stop reason: HOSPADM

## 2021-09-03 RX ORDER — LISINOPRIL 5 MG/1
10 TABLET ORAL DAILY
Status: DISCONTINUED | OUTPATIENT
Start: 2021-09-04 | End: 2021-09-05 | Stop reason: HOSPADM

## 2021-09-03 RX ORDER — ONDANSETRON 2 MG/ML
4 INJECTION INTRAMUSCULAR; INTRAVENOUS EVERY 6 HOURS PRN
Status: DISCONTINUED | OUTPATIENT
Start: 2021-09-03 | End: 2021-09-05 | Stop reason: HOSPADM

## 2021-09-03 RX ORDER — FAMOTIDINE 20 MG/1
20 TABLET, FILM COATED ORAL DAILY PRN
Status: DISCONTINUED | OUTPATIENT
Start: 2021-09-03 | End: 2021-09-05 | Stop reason: HOSPADM

## 2021-09-03 RX ORDER — FENTANYL CITRATE 50 UG/ML
25 INJECTION, SOLUTION INTRAMUSCULAR; INTRAVENOUS ONCE
Status: COMPLETED | OUTPATIENT
Start: 2021-09-03 | End: 2021-09-03

## 2021-09-03 RX ORDER — LEVOTHYROXINE SODIUM 25 UG/1
25 TABLET ORAL EVERY EVENING
Status: DISCONTINUED | OUTPATIENT
Start: 2021-09-03 | End: 2021-09-05 | Stop reason: HOSPADM

## 2021-09-03 RX ORDER — LIDOCAINE 40 MG/G
CREAM TOPICAL
Status: DISCONTINUED | OUTPATIENT
Start: 2021-09-03 | End: 2021-09-05 | Stop reason: HOSPADM

## 2021-09-03 RX ORDER — OXYCODONE AND ACETAMINOPHEN 5; 325 MG/1; MG/1
1 TABLET ORAL ONCE
Status: COMPLETED | OUTPATIENT
Start: 2021-09-03 | End: 2021-09-03

## 2021-09-03 RX ORDER — ACETAMINOPHEN 325 MG/1
975 TABLET ORAL 3 TIMES DAILY
Status: DISCONTINUED | OUTPATIENT
Start: 2021-09-03 | End: 2021-09-05 | Stop reason: HOSPADM

## 2021-09-03 RX ORDER — PROPAFENONE HYDROCHLORIDE 150 MG/1
150 TABLET, COATED ORAL 2 TIMES DAILY
Status: DISCONTINUED | OUTPATIENT
Start: 2021-09-03 | End: 2021-09-05 | Stop reason: HOSPADM

## 2021-09-03 RX ORDER — LATANOPROST 50 UG/ML
1 SOLUTION/ DROPS OPHTHALMIC AT BEDTIME
Status: DISCONTINUED | OUTPATIENT
Start: 2021-09-03 | End: 2021-09-05 | Stop reason: HOSPADM

## 2021-09-03 RX ORDER — HEPARIN SODIUM 5000 [USP'U]/.5ML
5000 INJECTION, SOLUTION INTRAVENOUS; SUBCUTANEOUS EVERY 12 HOURS
Status: DISCONTINUED | OUTPATIENT
Start: 2021-09-03 | End: 2021-09-05 | Stop reason: HOSPADM

## 2021-09-03 RX ORDER — ACETAMINOPHEN 500 MG
500-1000 TABLET ORAL EVERY 6 HOURS PRN
COMMUNITY
End: 2022-07-27

## 2021-09-03 RX ORDER — ONDANSETRON 4 MG/1
4 TABLET, ORALLY DISINTEGRATING ORAL EVERY 6 HOURS PRN
Status: DISCONTINUED | OUTPATIENT
Start: 2021-09-03 | End: 2021-09-05 | Stop reason: HOSPADM

## 2021-09-03 RX ORDER — POLYETHYLENE GLYCOL 3350 17 G/17G
17 POWDER, FOR SOLUTION ORAL DAILY PRN
Status: DISCONTINUED | OUTPATIENT
Start: 2021-09-03 | End: 2021-09-05 | Stop reason: HOSPADM

## 2021-09-03 RX ORDER — FAMOTIDINE 20 MG/1
20 TABLET, FILM COATED ORAL 2 TIMES DAILY PRN
Status: DISCONTINUED | OUTPATIENT
Start: 2021-09-03 | End: 2021-09-03

## 2021-09-03 RX ORDER — TRAMADOL HYDROCHLORIDE 50 MG/1
50 TABLET ORAL EVERY 6 HOURS PRN
Status: DISCONTINUED | OUTPATIENT
Start: 2021-09-03 | End: 2021-09-05 | Stop reason: HOSPADM

## 2021-09-03 RX ORDER — OXYCODONE HYDROCHLORIDE 5 MG/1
5 TABLET ORAL EVERY 4 HOURS PRN
Status: DISCONTINUED | OUTPATIENT
Start: 2021-09-03 | End: 2021-09-05 | Stop reason: HOSPADM

## 2021-09-03 RX ORDER — CALCIUM CARBONATE 500 MG/1
1000 TABLET, CHEWABLE ORAL 4 TIMES DAILY PRN
Status: DISCONTINUED | OUTPATIENT
Start: 2021-09-03 | End: 2021-09-05 | Stop reason: HOSPADM

## 2021-09-03 RX ORDER — BACITRACIN ZINC 500 [USP'U]/G
OINTMENT TOPICAL ONCE
Status: COMPLETED | OUTPATIENT
Start: 2021-09-03 | End: 2021-09-03

## 2021-09-03 RX ORDER — NALOXONE HYDROCHLORIDE 0.4 MG/ML
0.2 INJECTION, SOLUTION INTRAMUSCULAR; INTRAVENOUS; SUBCUTANEOUS
Status: DISCONTINUED | OUTPATIENT
Start: 2021-09-03 | End: 2021-09-05 | Stop reason: HOSPADM

## 2021-09-03 RX ADMIN — EPINEPHRINE BITARTRATE 3 ML: 1 POWDER at 15:51

## 2021-09-03 RX ADMIN — FENTANYL CITRATE 25 MCG: 50 INJECTION, SOLUTION INTRAMUSCULAR; INTRAVENOUS at 16:22

## 2021-09-03 RX ADMIN — FENTANYL CITRATE 25 MCG: 50 INJECTION, SOLUTION INTRAMUSCULAR; INTRAVENOUS at 15:31

## 2021-09-03 RX ADMIN — PROPAFENONE HYDROCHLORIDE 150 MG: 150 TABLET, FILM COATED ORAL at 21:51

## 2021-09-03 RX ADMIN — TRAMADOL HYDROCHLORIDE 50 MG: 50 TABLET, FILM COATED ORAL at 19:25

## 2021-09-03 RX ADMIN — LEVOTHYROXINE SODIUM 25 MCG: 0.03 TABLET ORAL at 21:51

## 2021-09-03 RX ADMIN — OXYCODONE HYDROCHLORIDE AND ACETAMINOPHEN 1 TABLET: 5; 325 TABLET ORAL at 16:43

## 2021-09-03 RX ADMIN — LATANOPROST 1 DROP: 50 SOLUTION OPHTHALMIC at 21:51

## 2021-09-03 RX ADMIN — HEPARIN SODIUM 5000 UNITS: 10000 INJECTION, SOLUTION INTRAVENOUS; SUBCUTANEOUS at 19:20

## 2021-09-03 RX ADMIN — BACITRACIN ZINC: 500 OINTMENT TOPICAL at 18:07

## 2021-09-03 RX ADMIN — ACETAMINOPHEN 975 MG: 325 TABLET ORAL at 21:51

## 2021-09-03 ASSESSMENT — ACTIVITIES OF DAILY LIVING (ADL): DEPENDENT_IADLS:: INDEPENDENT

## 2021-09-03 ASSESSMENT — MIFFLIN-ST. JEOR
SCORE: 1160.52
SCORE: 1231.11

## 2021-09-03 NOTE — PHARMACY-ADMISSION MEDICATION HISTORY
Pharmacy Note - Admission Medication History    Pertinent Provider Information: none     ______________________________________________________________________    Prior To Admission (PTA) med list completed and updated in EMR.       PTA Med List   Medication Sig Last Dose     acetaminophen (TYLENOL) 500 MG tablet Take 500-1,000 mg by mouth every 6 hours as needed for mild pain Unknown at Unknown time     calcium carbonate-vitamin D3 (CALCIUM 600 + D,3,) 600 mg(1,500mg) -400 unit per tablet Take 2 tablets by mouth At Bedtime  9/2/2021 at Unknown time     cholecalciferol, vitamin D3, 1,000 unit tablet Take 1,000 Units by mouth At Bedtime  9/2/2021 at Unknown time     latanoprost (XALATAN) 0.005 % ophthalmic solution Place 1 drop into both eyes At Bedtime  9/2/2021 at Unknown time     levothyroxine (SYNTHROID/LEVOTHROID) 25 MCG tablet TAKE 1 TABLET BY MOUTH DAILY AT 6 AM (Patient taking differently: Take 25 mcg by mouth every evening ) 9/2/2021 at Unknown time     lisinopril (ZESTRIL) 10 MG tablet Take 1 tablet (10 mg) by mouth daily 9/3/2021 at am     omega-3 fatty acids-fish oil (OMEGA 3 FISH OIL) 684-1,200 mg CpDR [OMEGA-3 FATTY ACIDS-FISH OIL (OMEGA 3 FISH OIL) 684-1,200 MG CPDR] Take 2 tablets by mouth every morning. 9/3/2021 at am     propafenone (RYTHMOL) 150 MG tablet [PROPAFENONE (RYTHMOL) 150 MG TABLET] Take 1 tablet (150 mg total) by mouth 2 (two) times a day. 9/3/2021 at am       Information source(s): Patient and CareEverywhere/Shravanpts  Method of interview communication: in-person    Summary of Changes to PTA Med List  New: none  Discontinued: none  Changed: none    Patient was asked about OTC/herbal products specifically.  PTA med list reflects this.    In the past week, patient estimated taking medication this percent of the time:  greater than 90%.    Allergies were reviewed, assessed, and updated with the patient.      Unsure if patient has eye drops with her    The information provided in this  note is only as accurate as the sources available at the time of the update(s).    Thank you for the opportunity to participate in the care of this patient.    Miya Sandoval RPH  9/3/2021 4:26 PM

## 2021-09-03 NOTE — ED TRIAGE NOTES
At or approx 1400 hours the patient tripped over some shoes. The patient did not lose consciousness, but did injure her left upper arm. The patient also sustained a slight laceration over her left eye. The patient denies neck, back, rib, pelvic pain. The patient was given 75 mcg of Fentanyl for pain. The patient's BG was 89 by EMS. The patient is A&Ox3, QASIM. The patient was slightly hypotensive at the scene and was given 250ml bolus of NS. The patient has good feeling in the left arm, hand and fingers with strong radial and ulnar pulses present. The patient is not on blood thinners.

## 2021-09-03 NOTE — CONSULTS
Care Management Initial Consult    General Information  Assessment completed with: Patient, pt  Type of CM/SW Visit: Initial Assessment    Primary Care Provider verified and updated as needed: Yes   Readmission within the last 30 days: no previous admission in last 30 days   Return Category: Progression of disease  Reason for Consult: discharge planning  Advance Care Planning: Advance Care Planning Reviewed: no concerns identified          Communication Assessment  Patient's communication style: spoken language (English or Bilingual)    Hearing Difficulty or Deaf: no   Wear Glasses or Blind: no    Cognitive  Cognitive/Neuro/Behavioral: WDL                      Living Environment:   People in home: alone     Current living Arrangements:        Able to return to prior arrangements: yes       Family/Social Support:  Care provided by:    Provides care for: no one                Description of Support System:      Support Assessment: Adequate family and caregiver support    Current Resources:   Patient receiving home care services: No     Community Resources: None  Equipment currently used at home: none  Supplies currently used at home: None    Employment/Financial:  Employment Status:          Financial Concerns:             Lifestyle & Psychosocial Needs:  Social Determinants of Health     Tobacco Use: Low Risk      Smoking Tobacco Use: Never Smoker     Smokeless Tobacco Use: Never Used   Alcohol Use:      Frequency of Alcohol Consumption:      Average Number of Drinks:      Frequency of Binge Drinking:    Financial Resource Strain:      Difficulty of Paying Living Expenses:    Food Insecurity:      Worried About Running Out of Food in the Last Year:      Ran Out of Food in the Last Year:    Transportation Needs:      Lack of Transportation (Medical):      Lack of Transportation (Non-Medical):    Physical Activity:      Days of Exercise per Week:      Minutes of Exercise per Session:    Stress:      Feeling of Stress :     Social Connections:      Frequency of Communication with Friends and Family:      Frequency of Social Gatherings with Friends and Family:      Attends Congregational Services:      Active Member of Clubs or Organizations:      Attends Club or Organization Meetings:      Marital Status:    Intimate Partner Violence:      Fear of Current or Ex-Partner:      Emotionally Abused:      Physically Abused:      Sexually Abused:    Depression: Not at risk     PHQ-2 Score: 2   Housing Stability:      Unable to Pay for Housing in the Last Year:      Number of Places Lived in the Last Year:      Unstable Housing in the Last Year:        Functional Status:  Prior to admission patient needed assistance:   Dependent ADLs:: Independent  Dependent IADLs:: Independent  Assesssment of Functional Status: At functional baseline    Mental Health Status:  Mental Health Status: No Current Concerns       Chemical Dependency Status:  Chemical Dependency Status: No Current Concerns             Values/Beliefs:  Spiritual, Cultural Beliefs, Congregational Practices, Values that affect care:                 Additional Information:  LUCY assessed, lives alone and independent at baseline, no svcs and dtr Lisa to transport at discharge. Discussed [facility].       Jose Sanchez RN

## 2021-09-03 NOTE — ED NOTES
Bed: JNED-03  Expected date: 9/3/21  Expected time:   Means of arrival: Ambulance  Comments:  theron

## 2021-09-03 NOTE — H&P
[facility]    History and Physical - Hospitalist Service       Date of Admission:  9/3/2021  Alexandra Menezes,  1940, MRN [id number]  PCP: Donna Buchanan    Assessment & Plan      Alexandra Menezes is a 81 year old female admitted on 9/3/2021. 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    #Comminuted left humerus diaphyseal fracture  Mechanical fall, tripped on her shoe  XR shows comminuted and long segment fracture of the left humeral diaphyseal shaft with 2 points of angulation and displacement (3+ part fracture)  She did not feel able to return home.  We will ask physical therapy and Occupational Therapy to evaluate her.  Work on pain control with scheduled Tylenol, lidocaine patch, tramadol as needed.  More complex than a typical proximal humerus fracture and have asked orthopedics to assist to make sure good discharge plan in place.  Does not sound like surgery is being recommended at this time.  NWB LUE with sling  Needs osteoporosis workup as outpatient    #L brow lac  Sutured in ED  Suture removal 7-10 days    #CKD 2-3  Baseline Cr 1-1.2, here 1.23  Avoid nephrotoxins    #HLD  Dramatically elevated  last check in March  Not on lipid lowering therapy. Takes fish oil    #Hypothyroidism, home Synthroid  #Hypertension, hold home lisinopril as blood pressure little bit soft right now.  #PVCs, continue home propafenone       Diet: Combination Diet Regular Diet Adult    DVT Prophylaxis: Moderate risk. SQH    [place] Catheter: Not present  Central Lines: None  Code Status: Full Code      Clinically Significant Risk Factors Present on Admission                   Disposition Plan   Expected discharge: 2021 recommended to home vs TCU when safe discharge plan made     The patient's care was discussed with the Patient and  Patient's Family.    Lissette Mendez MD  [facility]  Text page via [facility] Paging/Directory      ______________________________________________________________________    Chief Complaint   Fall, arm pain    History of Present Illness   Alexandra Menezes is a 81 year old female who presents for mechanical fall followed by left arm pain  Patient reports she was walking [place] her [place]home when the toe of her shoe seem to get caught on something and she stumbled and fell.  There was no clear obstacle, she was walking across a hardwood floor.  She was feeling her normal state of health before the fall.  Denies any numbness in her feet.  She does not typically fall. She does not use a mobility device.  She states she did not lose consciousness.  She did sustain a laceration to her left brow.  She noted immediately that her left arm was very painful and she could not bear weight on it.  Because of this it took her a while to make her way to the stairs where she was able to lever herself to her feet.  She then was able to call for help.  She currently denies any complaints except for the bad pain in her left arm.  She is very nervous about using the sling, she feels most comfortable when the arm is held straight.  Did discuss rationale for sling.  Patient does not feel able to return home at this time.    Her son Derek is faculty for [employer]    Review of Systems    The 10 point Review of Systems is negative other than noted in the HPI or here.    Past Medical History    I have reviewed this patient's medical history and updated it with pertinent information if needed.   Past Medical History:   Diagnosis Date     Arrhythmia     take propafenone     Arthritis      Breast cancer (H) 1992    right Mastectomy     CKD (chronic kidney disease) stage 3, GFR 30-59 ml/min 3/21/2019     Disease of thyroid gland     hypothyroid     Hypercholesterolemia      Hypertension      Multiple  premature ventricular complexes 3/21/2019     Sinus bradycardia     frequent PVC's     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       Past Surgical History   I have reviewed this patient's surgical history and updated it with pertinent information if needed.  Past Surgical History:   Procedure Laterality Date     BREAST SURGERY Right 1992    Mastectomy for Ca     MASTECTOMY Right 1992     PHACOEMULSIFICATION CLEAR CORNEA WITH STANDARD INTRAOCULAR LENS IMPLANT Bilateral      REPLACEMENT TOTAL KNEE Left 3/21/2019    Procedure: LEFT TOTAL KNEE ARTHROPLASTY,COMPUTER-ASSIST;  Surgeon: Fede Robert MD;  Location: [facility];  Service: Orthopedics       Social History   I have reviewed this patient's social history and updated it with pertinent information if needed.  Social History     Tobacco Use     Smoking status: Never Smoker     Smokeless tobacco: Never Used   Substance Use Topics     Alcohol use: Yes     Alcohol/week: 2.0 standard drinks     Drug use: No       Family History   I have reviewed this patient's family history and updated it with pertinent information if needed.  Family History   Problem Relation Age of Onset     Rheumatoid Arthritis Mother      Heart Disease Brother      CABG Brother      Heart Disease Maternal Grandmother      Valvular heart disease Father      Hyperthyroidism Sister      Heart Disease Sister      Clotting Disorder No family hx of        Prior to Admission Medications   Prior to Admission Medications   Prescriptions Last Dose Informant Patient Reported? Taking?   acetaminophen (TYLENOL) 500 MG tablet Unknown at Unknown time  Yes Yes   Sig: Take 500-1,000 mg by mouth every 6 hours as needed for mild pain   calcium carbonate-vitamin D3 (CALCIUM 600 + D,3,) 600 mg(1,500mg) -400 unit per tablet 9/2/2021 at Unknown time  Yes Yes   Sig: Take 2 tablets by mouth At Bedtime    cholecalciferol, vitamin D3, 1,000 unit tablet 9/2/2021 at Unknown time  Yes Yes   Sig: Take 1,000  Units by mouth At Bedtime    latanoprost (XALATAN) 0.005 % ophthalmic solution 9/2/2021 at Unknown time  Yes Yes   Sig: Place 1 drop into both eyes At Bedtime    levothyroxine (SYNTHROID/LEVOTHROID) 25 MCG tablet 9/2/2021 at Unknown time  No Yes   Sig: TAKE 1 TABLET BY MOUTH DAILY AT 6 AM   Patient taking differently: Take 25 mcg by mouth every evening    lisinopril (ZESTRIL) 10 MG tablet 9/3/2021 at am  No Yes   Sig: Take 1 tablet (10 mg) by mouth daily   omega-3 fatty acids-fish oil (OMEGA 3 FISH OIL) 684-1,200 mg CpDR 9/3/2021 at am  Yes Yes   Sig: [OMEGA-3 FATTY ACIDS-FISH OIL (OMEGA 3 FISH OIL) 684-1,200 MG CPDR] Take 2 tablets by mouth every morning.   propafenone (RYTHMOL) 150 MG tablet 9/3/2021 at am  No Yes   Sig: [PROPAFENONE (RYTHMOL) 150 MG TABLET] Take 1 tablet (150 mg total) by mouth 2 (two) times a day.      Facility-Administered Medications: None     Allergies   No Known Allergies    Physical Exam   Vital Signs: Temp: 97.7  F (36.5  C) Temp src: Oral BP: 99/50 Pulse: 58   Resp: 18 SpO2: 95 % O2 Device: None (Room air)    Weight: 170 lbs 0 oz    General: in no apparent distress, non-toxic and alert female lying in hospital bed oriented x3  HEENT: Head normocephalic, oral mucosa moist. Sclerae anicteric  CV: Regular rhythm, normal rate, no murmurs  Resp: No wheezes, no rales or rhonchi, no focal consolidations  GI: Belly soft, nondistended, nontender, bowel sounds present  Skin: Lac left brow with sutured in place. Faint bruising R knee skin intact  Extremities: No peripheral edema  Psych: Normal affect, slightly irritable mood  Neuro: CNII-XII grossly intact, moving all 4 extremities    Data   Data reviewed today: I reviewed all medications, new labs and imaging results over the last 24 hours.    Recent Results (from the past 12 hour(s))   CBC (+ platelets, no diff)    Collection Time: 09/03/21  5:08 PM   Result Value Ref Range    WBC Count 11.1 (H) 4.0 - 11.0 10e3/uL    RBC Count 4.35 3.80 - 5.20  10e6/uL    Hemoglobin 12.9 11.7 - 15.7 g/dL    Hematocrit 40.5 35.0 - 47.0 %    MCV 93 78 - 100 fL    MCH 29.7 26.5 - 33.0 pg    MCHC 31.9 31.5 - 36.5 g/dL    RDW 13.6 10.0 - 15.0 %    Platelet Count 208 150 - 450 10e3/uL   Basic metabolic panel    Collection Time: 09/03/21  5:08 PM   Result Value Ref Range    Sodium 139 136 - 145 mmol/L    Potassium 4.7 3.5 - 5.0 mmol/L    Chloride 108 (H) 98 - 107 mmol/L    Carbon Dioxide (CO2) 22 22 - 31 mmol/L    Anion Gap 9 5 - 18 mmol/L    Urea Nitrogen 24 8 - 28 mg/dL    Creatinine 1.23 (H) 0.60 - 1.10 mg/dL    Calcium 9.6 8.5 - 10.5 mg/dL    Glucose 107 70 - 125 mg/dL    GFR Estimate 42 (L) >60 mL/min/1.73m2   INR    Collection Time: 09/03/21  5:08 PM   Result Value Ref Range    INR 0.97 0.90 - 1.15   Asymptomatic COVID-19 Virus (Coronavirus) by PCR Nasopharyngeal    Collection Time: 09/03/21  5:41 PM    Specimen: Nasopharyngeal; Swab   Result Value Ref Range    SARS CoV2 PCR Negative Negative

## 2021-09-03 NOTE — ED PROVIDER NOTES
Emergency Department Encounter     Evaluation Date & Time:   9/3/2021  2:36 PM    CHIEF COMPLAINT:  Fall      Triage Note:At or approx 1400 hours the patient tripped over some shoes. The patient did not lose consciousness, but did injure her left upper arm. The patient also sustained a slight laceration over her left eye. The patient denies neck, back, rib, pelvic pain. The patient was given 75 mcg of Fentanyl for pain. The patient's BG was 89 by EMS. The patient is A&Ox3, QASIM. The patient was slightly hypotensive at the scene and was given 250ml bolus of NS. The patient has good feeling in the left arm, hand and fingers with strong radial and ulnar pulses present. The patient is not on blood thinners.         Impression and Plan       FINAL IMPRESSION:    ICD-10-CM    1. Closed displaced comminuted fracture of shaft of left humerus, initial encounter  S42.352A    2. Closed fracture of one rib of left side, initial encounter  S22.32XA    3. Facial laceration, initial encounter  S01.81XA          ED COURSE & MEDICAL DECISION MAKIN:52 PM I met the patient and performed my initial interview and exam.   4:37 PM I spoke with Dr. Mendez, hospitalist, regarding patient.   5:14 PM I sutured the patient's laceration.   6:20 PM Patient was admitted.       81 year old female, history of HTN, HLD and hypothyroidism, who presents for evaluation after a mechanical fall ~30 minutes ago. She tripped over some shoes and fell onto her right arm with inability to move the arm since.     She also sustained a laceration above the left eyebrow from her eyeglasses; did not hit her head otherwise and denies LOC, headache. She is not anticoagulated. She reports chronic upper back / lower neck pain that is at baseline.     She states that her chest is a little sore around her axilla, but denies chest pain otherwise. No shortness of breath. She denies abdominal pain and back pain.    On exam, right upper arm with obvious deformity.  She has decreased ROM, but CMS otherwise is intact.    X-rays performed and demonstrated comminuted and long-segment fracture involving the left humeral diaphysis shaft; fracture begins at the humeral neck without significant angulation deformity, but extends throughout the central and proximal thirds of the diaphyseal shaft with 2 points of angulation and displacement; no evidence for involvement of the elbow; no evidence for dislocation of elbow or shoulder joint. Patient placed in sling.    She reports soreness in the axillary region for which CXR was performed and demonstrated anterior-lateral left 9th rib fracture without substantial displacement; possibility of adjacent 8th and 10th rib fractures, though difficult in evaluation; no PTX or pleural effusion.    There is a 2cm laceration above the left eyebrow, which was primarily repaired (see procedure note).  Tetanus is up-to-date.  Nothing in history or on exam to suggest significant head or cervical spine injury and I do not think emergent imaging to evaluate for such is indicated.    Patient lives alone and thus decision made to admit for pain control, PT / OT and likely orthopedic consult regarding plan.  Patient stable throughout ED course.      At the conclusion of the encounter I discussed the results of all the tests and the disposition. The questions were answered. The patient and family acknowledged understanding and were agreeable with the care plan.      MEDICATIONS GIVEN IN THE EMERGENCY DEPARTMENT:  Medications   acetaminophen (TYLENOL) tablet 975 mg (975 mg Oral Given 9/4/21 0824)   latanoprost (XALATAN) 0.005 % ophthalmic solution 1 drop (1 drop Both Eyes Given 9/3/21 2151)   levothyroxine (SYNTHROID/LEVOTHROID) tablet 25 mcg (25 mcg Oral Given 9/3/21 2151)   lisinopril (ZESTRIL) tablet 10 mg ( Oral Automatically Held 9/7/21 0900)   propafenone (RYTHMOL) tablet 150 mg (150 mg Oral Given 9/4/21 0824)   lidocaine 1 % 0.1-1 mL (has no  administration in time range)   lidocaine (LMX4) cream (has no administration in time range)   sodium chloride (PF) 0.9% PF flush 3 mL (3 mLs Intracatheter Given 9/4/21 0221)   sodium chloride (PF) 0.9% PF flush 3 mL (has no administration in time range)   heparin ANTICOAGULANT injection 5,000 Units (5,000 Units Subcutaneous Given 9/4/21 0625)   melatonin tablet 1 mg (has no administration in time range)   magnesium hydroxide (MILK OF MAGNESIA) suspension 30 mL (has no administration in time range)   polyethylene glycol (MIRALAX) Packet 17 g (has no administration in time range)   bisacodyl (DULCOLAX) Suppository 10 mg (has no administration in time range)   ondansetron (ZOFRAN-ODT) ODT tab 4 mg (has no administration in time range)     Or   ondansetron (ZOFRAN) injection 4 mg (has no administration in time range)   calcium carbonate (TUMS) chewable tablet 1,000 mg (has no administration in time range)   traMADol (ULTRAM) tablet 50 mg (50 mg Oral Given 9/4/21 0033)   oxyCODONE (ROXICODONE) tablet 5 mg (has no administration in time range)   naloxone (NARCAN) injection 0.2 mg (has no administration in time range)     Or   naloxone (NARCAN) injection 0.4 mg (has no administration in time range)     Or   naloxone (NARCAN) injection 0.2 mg (has no administration in time range)     Or   naloxone (NARCAN) injection 0.4 mg (has no administration in time range)   famotidine (PEPCID) tablet 20 mg (has no administration in time range)   lidocaine/EPINEPHrine/tetracaine (LET) solution KIT 3 mL (3 mLs Topical Given 9/3/21 1551)   fentaNYL (PF) (SUBLIMAZE) injection 25 mcg (25 mcg Intravenous Given 9/3/21 1531)   fentaNYL (PF) (SUBLIMAZE) injection 25 mcg (25 mcg Intravenous Given 9/3/21 1622)   oxyCODONE-acetaminophen (PERCOCET) 5-325 MG per tablet 1 tablet (1 tablet Oral Given 9/3/21 1643)   bacitracin ointment ( Topical Given 9/3/21 1807)   0.9% sodium chloride BOLUS (0 mLs Intravenous Stopped 9/4/21 2900)       NEW  "PRESCRIPTIONS STARTED AT TODAY'S ED VISIT:  Current Discharge Medication List          HPI     HPI     Alexandra Menezes is a 81 year old female with a pertinent history of hypertension, hypothyroidism, hypercholesterolemia who presents to this ED via ambulance from home for evaluation after a fall. Patient tripped over some shoes at ~2:00 PM (~30 minutes ago) causing her to fall. She \"fell flat forward\" onto her kitchen floor landing onto her left arm with immediate left upper arm pain. She notes that she is unable to move the arm on its own, stating \"it's like the bone is broken and skin is just holding it together\". She states the pain radiates to her left shoulder. Denies numbness.     She sustained a laceration above the left eyebrow from her eyeglasses; did not hit her head otherwise and denies LOC, headache. She does not take any blood thinners. She reports chronic upper back / lower neck pain that is at baseline.     She states that her chest is a little sore around her axilla, but denies chest pain otherwise. No shortness of breath. She denies abdominal pain and back pain.    She has otherwise been in her usual state of health and denies N/V/D, cough, fever or other concerns.    Patient notes she takes medication for her heart.     Her last tetanus shot was in May of 2013.     REVIEW OF SYSTEMS:  All other systems reviewed and are negative.      Medical History     Past Medical History:   Diagnosis Date     Arrhythmia      Arthritis      Breast cancer (H) 1992     CKD (chronic kidney disease) stage 3, GFR 30-59 ml/min 3/21/2019     Disease of thyroid gland      Hypercholesterolemia      Hypertension      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     Sinus bradycardia      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       Past Surgical History:   Procedure Laterality Date     BREAST SURGERY Right 1992    Mastectomy for Ca     MASTECTOMY Right 1992     PHACOEMULSIFICATION CLEAR CORNEA WITH " "STANDARD INTRAOCULAR LENS IMPLANT Bilateral      REPLACEMENT TOTAL KNEE Left 3/21/2019    Procedure: LEFT TOTAL KNEE ARTHROPLASTY,COMPUTER-ASSIST;  Surgeon: Fede Robert MD;  Location: [facility] Main OR;  Service: Orthopedics       Family History   Problem Relation Age of Onset     Rheumatoid Arthritis Mother      Heart Disease Brother      CABG Brother      Heart Disease Maternal Grandmother      Valvular heart disease Father      Hyperthyroidism Sister      Heart Disease Sister      Clotting Disorder No family hx of        Social History     Tobacco Use     Smoking status: Never Smoker     Smokeless tobacco: Never Used   Substance Use Topics     Alcohol use: Yes     Alcohol/week: 2.0 standard drinks     Drug use: No       No current outpatient medications on file.      Physical Exam     First Vitals:  Patient Vitals for the past 24 hrs:   BP Temp Temp src Pulse Resp SpO2 Height Weight   09/04/21 0801 118/56 97.5  F (36.4  C) Oral 55 18 97 % -- --   09/04/21 0357 107/57 97.8  F (36.6  C) Oral 63 14 -- -- --   09/04/21 0220 118/59 -- -- 55 -- -- -- --   09/03/21 2358 (!) 84/44 97.7  F (36.5  C) Oral 56 16 96 % -- 70.1 kg (154 lb 7 oz)   09/03/21 2044 99/50 97.7  F (36.5  C) Oral 58 18 95 % -- --   09/03/21 1832 129/67 97.8  F (36.6  C) Oral 59 16 97 % -- --   09/03/21 1444 (!) 147/67 97.8  F (36.6  C) Oral 55 18 97 % 1.626 m (5' 4\") 77.1 kg (170 lb)       PHYSICAL EXAM:   Physical Exam    GENERAL: Awake, alert.  In mild acute distress.   HEENT: Normocephalic. Laceration above left eyebrow 2cm. Pupils equal, round and reactive. Conjunctiva normal. EOMI without entrapment or nystagmus.  NECK: No midline tenderness to palpation of cervical spine. No stridor.  PULMONARY: Chest is atraumatic and non-tender to palpation.  No palpable subcutaneous emphysema.  Symmetrical breath sounds without distress.  Lungs clear to auscultation bilaterally without wheezes, rhonchi or rales.  CARDIO: Regular rate and rhythm.  No " significant murmur, rub or gallop.  Radial pulses strong and symmetrical.  ABDOMINAL: Abdomen atraumatic, soft, non-distended and non-tender to palpation.    EXTREMITIES: Right upper arm with obvious deformity.  Radial pulses are strong and symmetrical. She has intact median / ulnar / radial nerve strength and hand  with sensation right arm grossly intact to light touch. No lower extremity swelling or edema.      NEURO: GCS 15. Alert and oriented to person, place and time.  Cranial nerves grossly intact.  No focal motor deficit.  PSYCH: Normal mood and affect.  SKIN: Laceration above left eyebrow , as noted above.     Results     LAB:  All pertinent labs reviewed and interpreted  Labs Ordered and Resulted from Time of ED Arrival Up to the Time of Departure from the ED   CBC WITH PLATELETS - Abnormal; Notable for the following components:       Result Value    WBC Count 11.1 (*)     All other components within normal limits   BASIC METABOLIC PANEL - Abnormal; Notable for the following components:    Chloride 108 (*)     Creatinine 1.23 (*)     GFR Estimate 42 (*)     All other components within normal limits   INR - Normal   [facility]       RADIOLOGY:  XR Chest 1 View   Final Result   IMPRESSION:       Incompletely visualized acute, comminuted and angulated proximal left humerus fracture.       Anterior-lateral left ninth rib fracture without substantial displacement (possibility of adjacent eighth and tenth rib fractures, though difficult in evaluation). No other definitive rib fracture by radiography.       No pleural effusion or pneumothorax. Normal heart size. Lungs are clear.      NOTE: ABNORMAL REPORT      THE DICTATION ABOVE DESCRIBES AN ABNORMALITY FOR WHICH FOLLOW-UP IS NEEDED.          Humerus XR,  G/E 2 views, left   Final Result   IMPRESSION: Comminuted and long-segment fracture involvement of the left humeral diaphyseal shaft. Fracture begins at the humeral neck without significant angulation  deformity but extends throughout the central and proximal thirds of the diaphyseal shaft    with 2 points of angulation and displacement seen 9.7 cm below the humeral head and 20.8 cm below the humeral head respectively. No evidence for involvement of the elbow. No evidence for dislocation at the elbow or shoulder joint.          PROCEDURES:  Procedures:  PROCEDURE: Laceration Repair   INDICATIONS: Laceration   PROCEDURE PROVIDER: Radhika Rodriguez    SITE: Left eyebrow   TYPE/SIZE: simple, clean and no foreign body visualized  2 cm (total length)   FUNCTIONAL ASSESSMENT: Distal sensation, circulation and motor intact   MEDICATION: 3 mLs of 1% Lidocaine with epinephrine after LET   PREPARATION: irrigation with Normal saline   DEBRIDEMENT: no debridement   CLOSURE:  Wound was closed in   one layer: Skin closed with 5 stitches of 6-0 nylon, interrupted fashion    Total number of sutures/staples placed: 5       I, Jaimie Coleman, am serving as a scribe to document services personally performed by Radhika Rodriguez MD based on my observation and the provider's statements to me. IRadhika MD attest that Jaimie Coleman is acting in a scribe capacity, has observed my performance of the services and has documented them in accordance with my direction.    Radhika Rodriguez MD  Emergency Medicine  [facility] EMERGENCY DEPARTMENT         Radhika Rodriguez MD  09/04/21 4138

## 2021-09-04 ENCOUNTER — APPOINTMENT (OUTPATIENT)
Dept: OCCUPATIONAL THERAPY | Facility: HOSPITAL | Age: 81
DRG: 563 | End: 2021-09-04
Attending: HOSPITALIST
Payer: COMMERCIAL

## 2021-09-04 ENCOUNTER — APPOINTMENT (OUTPATIENT)
Dept: PHYSICAL THERAPY | Facility: HOSPITAL | Age: 81
DRG: 563 | End: 2021-09-04
Attending: HOSPITALIST
Payer: COMMERCIAL

## 2021-09-04 LAB
ERYTHROCYTE [DISTWIDTH] IN BLOOD BY AUTOMATED COUNT: 13.5 % (ref 10–15)
HCT VFR BLD AUTO: 34.8 % (ref 35–47)
HGB BLD-MCNC: 11.1 G/DL (ref 11.7–15.7)
LACTATE SERPL-SCNC: 1.1 MMOL/L (ref 0.7–2)
MCH RBC QN AUTO: 29.3 PG (ref 26.5–33)
MCHC RBC AUTO-ENTMCNC: 31.9 G/DL (ref 31.5–36.5)
MCV RBC AUTO: 92 FL (ref 78–100)
PLATELET # BLD AUTO: 202 10E3/UL (ref 150–450)
RBC # BLD AUTO: 3.79 10E6/UL (ref 3.8–5.2)
TROPONIN I SERPL-MCNC: <0.01 NG/ML (ref 0–0.29)
WBC # BLD AUTO: 6.9 10E3/UL (ref 4–11)

## 2021-09-04 PROCEDURE — G0378 HOSPITAL OBSERVATION PER HR: HCPCS

## 2021-09-04 PROCEDURE — 96361 HYDRATE IV INFUSION ADD-ON: CPT

## 2021-09-04 PROCEDURE — 250N000011 HC RX IP 250 OP 636: Performed by: HOSPITALIST

## 2021-09-04 PROCEDURE — 93005 ELECTROCARDIOGRAM TRACING: CPT

## 2021-09-04 PROCEDURE — 97166 OT EVAL MOD COMPLEX 45 MIN: CPT | Mod: GO

## 2021-09-04 PROCEDURE — 120N000001 HC R&B MED SURG/OB

## 2021-09-04 PROCEDURE — 83605 ASSAY OF LACTIC ACID: CPT | Performed by: HOSPITALIST

## 2021-09-04 PROCEDURE — 97116 GAIT TRAINING THERAPY: CPT | Mod: GP

## 2021-09-04 PROCEDURE — 99207 PR CDG-CODE CATEGORY CHANGED: CPT | Performed by: INTERNAL MEDICINE

## 2021-09-04 PROCEDURE — 99232 SBSQ HOSP IP/OBS MODERATE 35: CPT | Performed by: INTERNAL MEDICINE

## 2021-09-04 PROCEDURE — 93010 ELECTROCARDIOGRAM REPORT: CPT | Performed by: INTERNAL MEDICINE

## 2021-09-04 PROCEDURE — 36415 COLL VENOUS BLD VENIPUNCTURE: CPT | Performed by: HOSPITALIST

## 2021-09-04 PROCEDURE — 250N000013 HC RX MED GY IP 250 OP 250 PS 637: Performed by: HOSPITALIST

## 2021-09-04 PROCEDURE — 84484 ASSAY OF TROPONIN QUANT: CPT | Performed by: HOSPITALIST

## 2021-09-04 PROCEDURE — 97535 SELF CARE MNGMENT TRAINING: CPT | Mod: GO

## 2021-09-04 PROCEDURE — 85027 COMPLETE CBC AUTOMATED: CPT | Performed by: HOSPITALIST

## 2021-09-04 PROCEDURE — 97161 PT EVAL LOW COMPLEX 20 MIN: CPT | Mod: GP

## 2021-09-04 PROCEDURE — 96372 THER/PROPH/DIAG INJ SC/IM: CPT | Performed by: HOSPITALIST

## 2021-09-04 PROCEDURE — 258N000003 HC RX IP 258 OP 636: Performed by: HOSPITALIST

## 2021-09-04 RX ADMIN — SODIUM CHLORIDE 1000 ML: 9 INJECTION, SOLUTION INTRAVENOUS at 00:37

## 2021-09-04 RX ADMIN — ACETAMINOPHEN 975 MG: 325 TABLET ORAL at 13:34

## 2021-09-04 RX ADMIN — ACETAMINOPHEN 975 MG: 325 TABLET ORAL at 20:36

## 2021-09-04 RX ADMIN — PROPAFENONE HYDROCHLORIDE 150 MG: 150 TABLET, FILM COATED ORAL at 20:35

## 2021-09-04 RX ADMIN — PROPAFENONE HYDROCHLORIDE 150 MG: 150 TABLET, FILM COATED ORAL at 08:24

## 2021-09-04 RX ADMIN — LEVOTHYROXINE SODIUM 25 MCG: 0.03 TABLET ORAL at 20:35

## 2021-09-04 RX ADMIN — TRAMADOL HYDROCHLORIDE 50 MG: 50 TABLET, FILM COATED ORAL at 00:33

## 2021-09-04 RX ADMIN — LATANOPROST 1 DROP: 50 SOLUTION OPHTHALMIC at 20:37

## 2021-09-04 RX ADMIN — HEPARIN SODIUM 5000 UNITS: 10000 INJECTION, SOLUTION INTRAVENOUS; SUBCUTANEOUS at 06:25

## 2021-09-04 RX ADMIN — HEPARIN SODIUM 5000 UNITS: 10000 INJECTION, SOLUTION INTRAVENOUS; SUBCUTANEOUS at 19:41

## 2021-09-04 RX ADMIN — ACETAMINOPHEN 975 MG: 325 TABLET ORAL at 08:24

## 2021-09-04 ASSESSMENT — ACTIVITIES OF DAILY LIVING (ADL)
WHICH_OF_THE_ABOVE_FUNCTIONAL_RISKS_HAD_A_RECENT_ONSET_OR_CHANGE?: FALL HISTORY
PREVIOUS_RESPONSIBILITIES: MEAL PREP;HOUSEKEEPING;LAUNDRY;SHOPPING;MEDICATION MANAGEMENT;FINANCES;DRIVING
TOILETING_ISSUES: NO

## 2021-09-04 NOTE — PLAN OF CARE
PRIMARY DIAGNOSIS: ACUTE PAIN  OUTPATIENT/OBSERVATION GOALS TO BE MET BEFORE DISCHARGE:  1. Pain Status: Improved-controlled with oral pain medications.    2. Return to near baseline physical activity: No    3. Cleared for discharge by consultants (if involved): No    Discharge Planner Nurse   Safe discharge environment identified: Yes  Barriers to discharge: Yes: Pain on left UE. Left arm is on sling.        Entered by: Ari Crews 09/04/2021 10:18 AM     Please review provider order for any additional goals.   Nurse to notify provider when observation goals have been met and patient is ready for discharge.

## 2021-09-04 NOTE — UTILIZATION REVIEW
Admission Status; Secondary Review Determination   Under the authority of the Utilization Management Committee, the utilization review process indicated a secondary review on Alexandra Menezes. The review outcome is based on review of the medical records, discussions with staff, and applying clinical experience noted on the date of the review.   (x) Inpatient Status Appropriate - This patient's medical care is consistent with medical management for inpatient care and reasonable inpatient medical practice.     RATIONALE FOR DETERMINATION   81 yr old female with remote breast cancer, frequent PVCs, HLD, CKD presented after fall with comminuted left humerus fracture.  Non-operative. Having uncontrolled pain.  Received several doses IV pain meds, tramadol and tylenol.   Crossing 2nd night for pain control.    At the time of admission with the information available to the attending physician more than 2 nights Hospital complex care was anticipated, based on patient risk of adverse outcome if treated as outpatient and complex care required. Inpatient admission is appropriate based on the Medicare guidelines.   The information on this document is developed by the utilization review team in order for the business office to ensure compliance. This only denotes the appropriateness of proper admission status and does not reflect the quality of care rendered.   The definitions of Inpatient Status and Observation Status used in making the determination above are those provided in the CMS Coverage Manual, Chapter 1 and Chapter 6, section 70.4.   Sincerely,   Fernanda Red MD  Utilization Review  Physician Advisor  [facility]

## 2021-09-04 NOTE — PLAN OF CARE
PRIMARY DIAGNOSIS: ACUTE PAIN  OUTPATIENT/OBSERVATION GOALS TO BE MET BEFORE DISCHARGE:  1. Pain Status: Improved-controlled with oral pain medications.    2. Return to near baseline physical activity: No    3. Cleared for discharge by consultants (if involved): No    Discharge Planner Nurse   Safe discharge environment identified: Yes  Barriers to discharge: Yes       Entered by: Jaclyn Michelle 09/03/2021 9:26 PM     Please review provider order for any additional goals.   Nurse to notify provider when observation goals have been met and patient is ready for discharge.    Pt pain down to a 5 with Tramadol.  Goal pain is 3.  Left arm in a sling.  Ortho consult was done.

## 2021-09-04 NOTE — PLAN OF CARE
Orthopedic Note - full consult to follow    Pt. Is a 82 y/o female c/o left upper arm pain after mechanical fall at home.  X-rays show comminuted segmental left humerus diaphyseal fracture.      IMP  Comminuted segmental left humerus diaphyseal fracture    REC  Review x-rays.  Plan nonoperative treatment with fracture brace and sling  Pain management  Your medical management  Ortho stable for discharge after brace fitting and pain controlled    Thank you,    To Emerson MD, DO on 9/3/2021 at 7:30 PM

## 2021-09-04 NOTE — PLAN OF CARE
PRIMARY DIAGNOSIS: ACUTE PAIN  OUTPATIENT/OBSERVATION GOALS TO BE MET BEFORE DISCHARGE:  1. Pain Status: Improved-controlled with oral pain medications.    2. Return to near baseline physical activity: No    3. Cleared for discharge by consultants (if involved): No    Discharge Planner Nurse   Safe discharge environment identified: No  Barriers to discharge: Yes       Entered by: Jaclyn Michelle 09/03/2021 11:03 PM     Please review provider order for any additional goals.   Nurse to notify provider when observation goals have been met and patient is ready for discharge.

## 2021-09-04 NOTE — PLAN OF CARE
PRIMARY DIAGNOSIS: ACUTE PAIN  OUTPATIENT/OBSERVATION GOALS TO BE MET BEFORE DISCHARGE:  1. Pain Status: Improved-controlled with oral pain medications.    2. Return to near baseline physical activity: No    3. Cleared for discharge by consultants (if involved): No    Discharge Planner Nurse   Safe discharge environment identified: Yes  Barriers to discharge: Yes: Pending orthosis brace       Entered by: Ari Crews 09/04/2021 2:16 PM     Please review provider order for any additional goals.   Nurse to notify provider when observation goals have been met and patient is ready for discharge.

## 2021-09-04 NOTE — PLAN OF CARE
Problem: Neurovascular Compromise (Orthopaedic Fracture)  Goal: Effective Tissue Perfusion  Outcome: Improving: Patient denies any numbness of tingling. CMS intact.      Problem: Pain (Orthopaedic Fracture)  Goal: Acceptable Pain Control  Outcome: Improving: Left arm is sling. Patient rate pain at 3/10. Tylenol and Ice pack applied. Patient states that ice really helping. Continue to monitor.

## 2021-09-04 NOTE — PLAN OF CARE
"PRIMARY DIAGNOSIS: \"GENERIC\" NURSING  OUTPATIENT/OBSERVATION GOALS TO BE MET BEFORE DISCHARGE:  ADLs back to baseline: No    Activity and level of assistance: Up with standby assistance.    Pain status: Improved-controlled with oral pain medications.    Return to near baseline physical activity: No     Discharge Planner Nurse   Safe discharge environment identified: No  Barriers to discharge: Yes       Entered by: Breanne Romeo 09/04/2021 1:43 AM   Pt pain managed with PRN Tramadol; Low BP, received IV bolus.   Please review provider order for any additional goals.   Nurse to notify provider when observation goals have been met and patient is ready for discharge.  "

## 2021-09-04 NOTE — CONSULTS
[facility] Orthopedic Consultation    Alexandra Menezes MRN# [id number]   Age: 81 year old YOB: 1940     Date of Admission:  9/3/2021    Reason for consult: Left humeral shaft fracture       Requesting physician: Dr. Mendez       Level of consult: One-time consult to assist in determining a diagnosis and to recommend an appropriate treatment plan           Assessment and Plan:   Assessment:   Segmental left diaphyseal humeral shaft fracture, acute      Plan:   1. NWB left arm  2. Functional brace (fleming) to be fit while inpatient  3. Sling for comfort  4. Followup with [facility] in 1-2 weeks at [facility]            Chief Complaint:   Right arm pain       History is obtained from the patient         History of Present Illness:   This patient is a 81 year old female who presents with the following condition requiring a hospital admission:      Alexandra is a 81 year-old right hand dominant female who tripped yesterday on her shoes and landed on the left arm.  She had immediate pain and swelling.  She was brought to [facility] ED where radiographs showed a segmental left diaphyseal humeral shaft fracture.  Denies numbness or tingling.  Pain is moderate.           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     Sinus bradycardia     frequent PVC's     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             Past Surgical History:     Past Surgical History:   Procedure Laterality Date     BREAST SURGERY Right 1992    Mastectomy for Ca     MASTECTOMY Right 1992     PHACOEMULSIFICATION CLEAR CORNEA WITH STANDARD INTRAOCULAR LENS IMPLANT  Bilateral      REPLACEMENT TOTAL KNEE Left 3/21/2019    Procedure: LEFT TOTAL KNEE ARTHROPLASTY,COMPUTER-ASSIST;  Surgeon: Fede Robert MD;  Location: [facility] OR;  Service: Orthopedics             Social History:     Social History     Tobacco Use     Smoking status: Never Smoker     Smokeless tobacco: Never Used   Substance Use Topics     Alcohol use: Yes     Alcohol/week: 2.0 standard drinks             Family History:     Family History   Problem Relation Age of Onset     Rheumatoid Arthritis Mother      Heart Disease Brother      CABG Brother      Heart Disease Maternal Grandmother      Valvular heart disease Father      Hyperthyroidism Sister      Heart Disease Sister      Clotting Disorder No family hx of      Family history reviewed and updated in [facility]          Immunizations:   Immunization status is unknown          Allergies:   No Known Allergies          Medications:     Current Facility-Administered Medications   Medication     acetaminophen (TYLENOL) tablet 975 mg     bisacodyl (DULCOLAX) Suppository 10 mg     calcium carbonate (TUMS) chewable tablet 1,000 mg     famotidine (PEPCID) tablet 20 mg     heparin ANTICOAGULANT injection 5,000 Units     latanoprost (XALATAN) 0.005 % ophthalmic solution 1 drop     levothyroxine (SYNTHROID/LEVOTHROID) tablet 25 mcg     lidocaine (LMX4) cream     lidocaine 1 % 0.1-1 mL     [Held by provider] lisinopril (ZESTRIL) tablet 10 mg     magnesium hydroxide (MILK OF MAGNESIA) suspension 30 mL     melatonin tablet 1 mg     naloxone (NARCAN) injection 0.2 mg    Or     naloxone (NARCAN) injection 0.4 mg    Or     naloxone (NARCAN) injection 0.2 mg    Or     naloxone (NARCAN) injection 0.4 mg     ondansetron (ZOFRAN-ODT) ODT tab 4 mg    Or     ondansetron (ZOFRAN) injection 4 mg     oxyCODONE (ROXICODONE) tablet 5 mg     polyethylene glycol (MIRALAX) Packet 17 g     propafenone (RYTHMOL) tablet 150 mg     sodium chloride (PF) 0.9% PF flush 3 mL     sodium chloride  (PF) 0.9% PF flush 3 mL     traMADol (ULTRAM) tablet 50 mg             Review of Systems:   The Review of Systems is negative other than noted in the HPI          Physical Exam:   Vitals were reviewed  All vitals have been reviewed  Musculoskeletal:   Left arm in sling  Moderate ecchymosis and swelling axilla  Normal sensation in the superficial radial, median, ulnar nerve distributions.  Able to make a fist, extend all digits/thumb, make a fist  Normal sensation axillary  Nerve distribution  Palpable radial pulse             Data:   All laboratory data reviewed  All imaging studies reviewed by me.   AP and lateral of the left humerus show segmental diaphyseal humeral shaft fracture.  Attestation:  I have reviewed today's vital signs, notes, medications, labs and imaging.    Haroon Justin MD

## 2021-09-04 NOTE — PLAN OF CARE
PRIMARY DIAGNOSIS: ACUTE PAIN  OUTPATIENT/OBSERVATION GOALS TO BE MET BEFORE DISCHARGE:  1. Pain Status: Improved-controlled with oral pain medications.    2. Return to near baseline physical activity: Yes    3. Cleared for discharge by consultants (if involved): Yes    Discharge Planner Nurse   Safe discharge environment identified: Yes  Barriers to discharge: Yes       Entered by: Breanne Romeo 09/04/2021 4:05 AM   Pain well managed with PRN pain med. Soft BP, getting better post bolus IV.  Please review provider order for any additional goals.   Nurse to notify provider when observation goals have been met and patient is ready for discharge.

## 2021-09-04 NOTE — PROGRESS NOTES
[facility] Internal Medicine Progress Note      ASSESSMENT:    Active Problems:    Facial laceration, initial encounter    Closed displaced comminuted fracture of shaft of left humerus, initial encounter    Closed fracture of one rib of left side, initial encounter      PLAN:   81-year-old female with history of remote breast cancer, 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      Left humerus fracture: Ortho recommends functional brace and outpatient follow-up.  We will continue supportive cares.  OT feels patient can return home.  Will await PT opinion.  Nonweightbearing left upper extremity.      Left brow laceration: Sutured in the ED, plan suture removal in 7 to 10 days      Anterior-lateral left ninth rib fracture without substantial displacement with possibility of adjacent eighth and tenth rib fractures  --Denies symptoms, No pleural effusion or pneumothorax noted on chest x-ray  --Recommend ambulation, deep breaths, patient and son feel she doesn't need to go home with IS       CKD 3: GFR near baseline, will trend      Hypothyroidism: Continue home Synthroid      Hypertension: BP rather soft, hold home lisinopril      Frequent PVCs: Continue home propafenone      Hyperlipidemia: Patient with elevated LDL on last check in March and not on lipid lowering therapy  --Continue fish oil, defer further management to PCP                DVT PPX: Subcu heparin    Needs for Discharge: slow clinical improvement, pain management and mobility progress     ESTIMATED DISCHARGE:  1D  Home versus TCU        Bill Carmichael D.O.              -------------------------------------------------------------------------------------------------------------  SUBJECTIVE: NAD. Denies any nausea, vomiting, abdominal pain, chest pain, SOB, fevers, chills, confusion or headache.     Exam:  /63 (BP Location: Right arm)   Pulse 57   Temp 98.5  F (36.9  C) (Oral)   " Resp 18   Ht 1.626 m (5' 4\")   Wt 70.1 kg (154 lb 7 oz)   SpO2 97%   BMI 26.51 kg/m    General: NAD  RESPIRATORY: Breathing nonlabored  CARDIOVASCULAR: No le edema bilat.   ABDOMEN: soft and non-tender  MUSCULOSKELETAL: Left upper extremity immobilized  NEUROLOGIC: Motor and sensory intact, speech clear           Diagnostics Reviewed:      Recent Results (from the past 24 hour(s))   CBC (+ platelets, no diff)    Collection Time: 09/03/21  5:08 PM   Result Value Ref Range    WBC Count 11.1 (H) 4.0 - 11.0 10e3/uL    RBC Count 4.35 3.80 - 5.20 10e6/uL    Hemoglobin 12.9 11.7 - 15.7 g/dL    Hematocrit 40.5 35.0 - 47.0 %    MCV 93 78 - 100 fL    MCH 29.7 26.5 - 33.0 pg    MCHC 31.9 31.5 - 36.5 g/dL    RDW 13.6 10.0 - 15.0 %    Platelet Count 208 150 - 450 10e3/uL   Basic metabolic panel    Collection Time: 09/03/21  5:08 PM   Result Value Ref Range    Sodium 139 136 - 145 mmol/L    Potassium 4.7 3.5 - 5.0 mmol/L    Chloride 108 (H) 98 - 107 mmol/L    Carbon Dioxide (CO2) 22 22 - 31 mmol/L    Anion Gap 9 5 - 18 mmol/L    Urea Nitrogen 24 8 - 28 mg/dL    Creatinine 1.23 (H) 0.60 - 1.10 mg/dL    Calcium 9.6 8.5 - 10.5 mg/dL    Glucose 107 70 - 125 mg/dL    GFR Estimate 42 (L) >60 mL/min/1.73m2   INR    Collection Time: 09/03/21  5:08 PM   Result Value Ref Range    INR 0.97 0.90 - 1.15   Asymptomatic COVID-19 Virus (Coronavirus) by PCR Nasopharyngeal    Collection Time: 09/03/21  5:41 PM    Specimen: Nasopharyngeal; Swab   Result Value Ref Range    SARS CoV2 PCR Negative Negative   Lactic acid whole blood    Collection Time: 09/04/21 12:35 AM   Result Value Ref Range    Lactic Acid 1.1 0.7 - 2.0 mmol/L   CBC with platelets    Collection Time: 09/04/21 12:35 AM   Result Value Ref Range    WBC Count 6.9 4.0 - 11.0 10e3/uL    RBC Count 3.79 (L) 3.80 - 5.20 10e6/uL    Hemoglobin 11.1 (L) 11.7 - 15.7 g/dL    Hematocrit 34.8 (L) 35.0 - 47.0 %    MCV 92 78 - 100 fL    MCH 29.3 26.5 - 33.0 pg    MCHC 31.9 31.5 - 36.5 g/dL    " RDW 13.5 10.0 - 15.0 %    Platelet Count 202 150 - 450 10e3/uL   Troponin I    Collection Time: 09/04/21 12:35 AM   Result Value Ref Range    Troponin I <0.01 0.00 - 0.29 ng/mL   ECG 12-LEAD WITH MUSE (LHE)    Collection Time: 09/04/21  7:11 AM   Result Value Ref Range    Systolic Blood Pressure  mmHg    Diastolic Blood Pressure  mmHg    Ventricular Rate 56 BPM    Atrial Rate 56 BPM    NE Interval 172 ms    QRS Duration 96 ms     ms    QTc 426 ms    P Axis 60 degrees    R AXIS -19 degrees    T Axis -9 degrees    Interpretation ECG       Sinus bradycardia  Otherwise normal ECG  When compared with ECG of 08-MAR-2019 13:26,  Inverted T waves have replaced nonspecific T wave abnormality in Inferior leads

## 2021-09-04 NOTE — PROGRESS NOTES
Psychiatric      OUTPATIENT OCCUPATIONAL THERAPY  EVALUATION  PLAN OF TREATMENT FOR OUTPATIENT REHABILITATION  (COMPLETE FOR INITIAL CLAIMS ONLY)  Patient's Last Name, First Name, M.I.  YOB: 1940  Alexandra Menezes                          Provider's Name  Psychiatric Medical Record No.  [id number]                               Onset Date:  09/03/21   Start of Care Date:  (P) 09/04/21     Type:     ___PT   _X_OT   ___SLP Medical Diagnosis:                           OT Diagnosis:  weakness, fatigue, pain   Visits from SOC:  1   _________________________________________________________________________________  Plan of Treatment/Functional Goals    Planned Interventions: ADL retraining, IADL retraining   Goals: See Occupational Therapy Goals on Care Plan in [employer] electronic health record.    Therapy Frequency: Daily  Predicted Duration of Therapy Intervention: 7 days  _________________________________________________________________________________    I CERTIFY THE NEED FOR THESE SERVICES FURNISHED UNDER        THIS PLAN OF TREATMENT AND WHILE UNDER MY CARE     (Physician co-signature of this document indicates review and certification of the therapy plan).                Certification date from: (P) 09/04/21, Certification date to: (P) 09/10/21    Referring Physician: Dr Carmichael            Initial Assessment        See Occupational Therapy evaluation dated (P) 09/04/21 in Epic electronic health record.

## 2021-09-04 NOTE — PROGRESS NOTES
Occupational Therapy     09/04/21 1037   Quick Adds   Type of Visit Initial Occupational Therapy Evaluation   Living Environment   People in home alone   Current Living Arrangements other (see comments)  ([facility])   Home Accessibility stairs to enter home;stairs within home   Number of Stairs, Main Entrance 2   Stair Railings, Main Entrance railings safe and in good condition   Number of Stairs, Within Home, Primary greater than 10 stairs   Stair Railings, Within Home, Primary railings safe and in good condition   Transportation Anticipated car, drives self   Self-Care   Usual Activity Tolerance excellent   Current Activity Tolerance fair   Regular Exercise Yes   Activity/Exercise Type swimming;walking   Exercise Amount/Frequency 3-5 times/wk   Equipment Currently Used at Home raised toilet seat;shower chair   Instrumental Activities of Daily Living (IADL)   Previous Responsibilities meal prep;housekeeping;laundry;shopping;medication management;finances;driving   Disability/Function   Hearing Difficulty or Deaf no   Wear Glasses or Blind yes   Concentrating, Remembering or Making Decisions Difficulty no   Difficulty Communicating no   Difficulty Eating/Swallowing no   Walking or Climbing Stairs Difficulty no   Dressing/Bathing Difficulty no   Doing Errands Independently Difficulty (such as shopping) no   Fall history within last six months yes   General Information   Onset of Illness/Injury or Date of Surgery 09/03/21   Referring Physician Dr Carmichael   Patient/Family Therapy Goal Statement (OT) go home   Additional Occupational Profile Info/Pertinent History of Current Problem mod complexity   Existing Precautions/Restrictions other (see comments)  (LUE NWB and sling worn)   Limitations/Impairments other (see comments)  (Decreased use of LUE)   Left Upper Extremity (Weight-bearing Status) non weight-bearing (NWB)   Right Upper Extremity (Weight-bearing Status) full weight-bearing (FWB)   Left Lower Extremity  (Weight-bearing Status) full weight-bearing (FWB)   Right Lower Extremity (Weight-bearing Status) full weight-bearing (FWB)   Cognitive Status Examination   Orientation Status orientation to person, place and time   Affect/Mental Status (Cognitive) WNL   Follows Commands WNL   Visual Perception   Visual Impairment/Limitations WNL;corrective lenses full-time   Sensory   Sensory Quick Adds No deficits were identified   Pain Assessment   Patient Currently in Pain Yes, see Vital Sign flowsheet   Posture   Posture forward head position   Range of Motion Comprehensive   General Range of Motion upper extremity range of motion deficits identified   Comment, General Range of Motion LUE immobilized   General Upper Extremity Assessment (Range of Motion)   Upper Extremity: Range of Motion other (see comments)  (LUE immobilized)   Strength Comprehensive (MMT)   General Manual Muscle Testing (MMT) Assessment upper extremity strength deficits identified   Comment, General Manual Muscle Testing (MMT) Assessment due to LUE fx/immobilized   Muscle Tone Assessment   Muscle Tone Quick Adds No deficits were identified   Coordination   Upper Extremity Coordination Left UE impaired  (due to LUE fx)   Bed Mobility   Bed Mobility supine-sit;sit-supine   Supine-Sit [place] (Bed Mobility) minimum assist (75% patient effort)   Sit-Supine [place] (Bed Mobility) minimum assist (75% patient effort)   Assistive Device (Bed Mobility) bed rails   Transfers   Transfers bed-chair transfer   Transfer Comments cues for safety   Transfer Skill: Bed to Chair/Chair to Bed   Bed-Chair [place] (Transfers) contact guard   Assistive Device (Bed-Chair Transfers) other (see comments)  (none)   Transfer Comments cues for hand placement of RUE   Balance   Balance Assessment standing balance: dynamic   Standing Balance: Dynamic fair balance   Balance Comments CGA   Activities of Daily Living   BADL Assessment lower body dressing   Lower Body Dressing  Assessment   [place] Level (Lower Body Dressing) minimum assist (75% patient effort)   Assistive Devices (Lower Body Dressing) other (see comments)  (none)   Position (Lower Body Dressing) unsupported sitting;unsupported standing   Clinical Impression   Criteria for Skilled Therapeutic Interventions Met (OT) skilled treatment is necessary   OT Diagnosis weakness, fatigue, pain   OT Problem List-Impairments impacting ADL balance;strength;pain   Assessment of Occupational Performance 3-5 Performance Deficits   Identified Performance Deficits weakness, fatigue, pain   Planned Therapy Interventions (OT) ADL retraining;IADL retraining   Clinical Decision Making Complexity (OT) moderate complexity   Therapy Frequency (OT) Daily   Predicted Duration of Therapy 7 days   Anticipated Equipment Needs Upon Discharge (OT) other (see comments)  (none)   Risk & Benefits of therapy have been explained care plan/treatment goals reviewed   OT Discharge Planning    OT Discharge Recommendation (DC Rec) Home with assist   Therapy Certification   Start of Care Date 09/04/21   Certification date from 09/04/21   Certification date to 09/10/21   Total Evaluation Time (Minutes)   Total Evaluation Time (Minutes) 15

## 2021-09-04 NOTE — SIGNIFICANT EVENT
Significant Event Note    Time of event: 12:28 AM September 4, 2021    Description of event:  Called by RN due to hypotension - 84/44. Patient does feel dizzy/lightheaded, is in pain from humerus fracture. Did not receive antihypertensives upon arrival to the floor.     Plan:  1LNS bolus. Ordered stat lactate, CBC, troponin, EKG. Watch BP closely.    Discussed with: bedside nurse    Maribeth Rachel MD  [facility] [place], [place] [facility], PGY-1  Pager #: [phone]

## 2021-09-05 ENCOUNTER — DOCUMENTATION ONLY (OUTPATIENT)
Dept: ORTHOPEDICS | Facility: CLINIC | Age: 81
End: 2021-09-05

## 2021-09-05 ENCOUNTER — APPOINTMENT (OUTPATIENT)
Dept: PHYSICAL THERAPY | Facility: HOSPITAL | Age: 81
DRG: 563 | End: 2021-09-05
Payer: COMMERCIAL

## 2021-09-05 VITALS
RESPIRATION RATE: 16 BRPM | TEMPERATURE: 98 F | OXYGEN SATURATION: 97 % | BODY MASS INDEX: 26.37 KG/M2 | HEIGHT: 64 IN | DIASTOLIC BLOOD PRESSURE: 39 MMHG | WEIGHT: 154.44 LBS | HEART RATE: 77 BPM | SYSTOLIC BLOOD PRESSURE: 107 MMHG

## 2021-09-05 LAB
ANION GAP SERPL CALCULATED.3IONS-SCNC: 10 MMOL/L (ref 5–18)
BUN SERPL-MCNC: 14 MG/DL (ref 8–28)
CALCIUM SERPL-MCNC: 9.2 MG/DL (ref 8.5–10.5)
CHLORIDE BLD-SCNC: 108 MMOL/L (ref 98–107)
CO2 SERPL-SCNC: 23 MMOL/L (ref 22–31)
CREAT SERPL-MCNC: 0.96 MG/DL (ref 0.6–1.1)
GFR SERPL CREATININE-BSD FRML MDRD: 56 ML/MIN/1.73M2
GLUCOSE BLD-MCNC: 106 MG/DL (ref 70–125)
POTASSIUM BLD-SCNC: 4.5 MMOL/L (ref 3.5–5)
SODIUM SERPL-SCNC: 141 MMOL/L (ref 136–145)

## 2021-09-05 PROCEDURE — 250N000011 HC RX IP 250 OP 636: Performed by: HOSPITALIST

## 2021-09-05 PROCEDURE — 250N000013 HC RX MED GY IP 250 OP 250 PS 637: Performed by: HOSPITALIST

## 2021-09-05 PROCEDURE — 99239 HOSP IP/OBS DSCHRG MGMT >30: CPT | Performed by: INTERNAL MEDICINE

## 2021-09-05 PROCEDURE — 36415 COLL VENOUS BLD VENIPUNCTURE: CPT | Performed by: INTERNAL MEDICINE

## 2021-09-05 PROCEDURE — 250N000013 HC RX MED GY IP 250 OP 250 PS 637: Performed by: INTERNAL MEDICINE

## 2021-09-05 PROCEDURE — 80048 BASIC METABOLIC PNL TOTAL CA: CPT | Performed by: INTERNAL MEDICINE

## 2021-09-05 RX ORDER — LISINOPRIL 5 MG/1
10 TABLET ORAL ONCE
Status: COMPLETED | OUTPATIENT
Start: 2021-09-05 | End: 2021-09-05

## 2021-09-05 RX ORDER — OXYCODONE HYDROCHLORIDE 5 MG/1
5 TABLET ORAL EVERY 6 HOURS PRN
Qty: 10 TABLET | Refills: 0 | Status: SHIPPED | OUTPATIENT
Start: 2021-09-05 | End: 2022-05-11

## 2021-09-05 RX ADMIN — HEPARIN SODIUM 5000 UNITS: 10000 INJECTION, SOLUTION INTRAVENOUS; SUBCUTANEOUS at 06:12

## 2021-09-05 RX ADMIN — ACETAMINOPHEN 975 MG: 325 TABLET ORAL at 08:33

## 2021-09-05 RX ADMIN — LISINOPRIL 10 MG: 5 TABLET ORAL at 10:23

## 2021-09-05 RX ADMIN — TRAMADOL HYDROCHLORIDE 50 MG: 50 TABLET, FILM COATED ORAL at 12:28

## 2021-09-05 RX ADMIN — PROPAFENONE HYDROCHLORIDE 150 MG: 150 TABLET, FILM COATED ORAL at 08:33

## 2021-09-05 RX ADMIN — ACETAMINOPHEN 975 MG: 325 TABLET ORAL at 14:32

## 2021-09-05 NOTE — PLAN OF CARE
Problem: Adult Inpatient Plan of Care  Goal: Plan of Care Review  Outcome: Improving     Problem: Adult Inpatient Plan of Care  Goal: Patient-Specific Goal (Individualized)  Outcome: Improving     Problem: Adult Inpatient Plan of Care  Goal: Absence of Hospital-Acquired Illness or Injury  Intervention: Prevent Skin Injury  Recent Flowsheet Documentation  Taken 9/4/2021 1942 by Breanne Romeo RN  Body Position: position changed independently     Problem: Adult Inpatient Plan of Care  Goal: Optimal Comfort and Wellbeing  Outcome: Improving     Problem: Functional Ability Impaired (Orthopaedic Fracture)  Goal: Optimal Functional Ability  Outcome: Improving  Intervention: Optimize Functional Ability  Recent Flowsheet Documentation  Taken 9/4/2021 1942 by Breanne Romeo RN  Activity Management:   ambulated in room   ambulated to bathroom  Positioning/Transfer Devices:   pillows   in use     Problem: Fracture Stabilization and Management (Orthopaedic Fracture)  Goal: Fracture Stability  Outcome: Improving     Problem: Pain (Orthopaedic Fracture)  Goal: Acceptable Pain Control  Outcome: Improving   PAIN WELL MANAGED WITH ICE PACK AND SCHEDULE PAIN MEDICATION AT THIS TIME. WILL CONTINUE TO MONITOR.

## 2021-09-05 NOTE — PROGRESS NOTES
Care Management Follow Up    Length of Stay (days): 1    Expected Discharge Date: 09/06/2021     Concerns to be Addressed:     Pain mgmt, Mobility progression  Patient plan of care discussed at interdisciplinary rounds: No    Anticipated Discharge Disposition:  Home anticipated     Anticipated Discharge Services:  None identified at this time  Anticipated Discharge DME:          Additional Information:  Pt lives alone and independent at baseline, no svcs and dtr Lisa to transport at discharge.    ED Delacruz

## 2021-09-05 NOTE — DISCHARGE SUMMARY
[facility] MEDICINE  DISCHARGE SUMMARY      Primary Care Physician: Donna Buchanan              Admission Date: 9/3/2021    Discharge Provider: Bill Carmichael DO Discharge Date: 9/5/2021    Diet: see discharge orders below Code Status: Full Code    Activity: as tolerated       Condition at Discharge: Stable      REASON FOR ADMISSION (See Admission Note for Details)      Mechanical fall with left humerus fracture    PRINCIPAL DISCHARGE DIAGNOSIS    Active Problems:    Facial laceration, initial encounter    Closed displaced comminuted fracture of shaft of left humerus, initial encounter    Closed fracture of one rib of left side, initial encounter        SIGNIFICANT FINDINGS (Imaging, labs):      See below    PENDING LABS      None    PROCEDURES ( this hospitalization only)       None    RECOMMENDATION FOR F/U VISIT      See below    DISPOSITION ( home, home care, TCU...)      Home    SUMMARY OF HOSPITAL COURSE:       81-year-old female with history of remote breast cancer, 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        Left humerus fracture:   --Ortho recommends functional brace and outpatient follow-up.    --PT/OT feels patient safe to return to home.   --Nonweightbearing left upper extremity.   -- follow up with orthopedic surgery 1-2 weeks  -- modest pain control ordered for after discharge       Left brow laceration: Sutured in the ED, plan suture removal in 5-7 days        Anterior-lateral left ninth rib fracture without substantial displacement with possibility of adjacent eighth and tenth rib fractures  --Denies symptoms, No pleural effusion or pneumothorax noted on chest x-ray  --Recommend ambulation, deep breaths, patient and son feel she doesn't need to go home with IS         CKD 3: GFR baseline        Hypothyroidism: Continue home Synthroid        Hypertension: continue home  lisinopril        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 If we are uncertain of the size of tablets/capsules you have at home, strength may be listed as something that might have changed.      Dose / Directions   levothyroxine 25 MCG tablet  Commonly known as: SYNTHROID/LEVOTHROID  This may have changed: See the new instructions.  Used for: Hypothyroidism      TAKE 1 TABLET BY MOUTH DAILY AT 6 AM  Quantity: 90 tablet  Refills: 2        CONTINUE these medicines which have NOT CHANGED      Dose / Directions   acetaminophen 500 MG tablet  Commonly known as: TYLENOL      Dose: 500-1,000 mg  Take 500-1,000 mg by mouth every 6 hours as needed for mild pain  Refills: 0     calcium carbonate 600 mg-vitamin D 400 units 600-400 MG-UNIT per tablet  Commonly known as: CALTRATE      Dose: 2 tablet  Take 2 tablets by mouth At Bedtime  Refills: 0     cholecalciferol 25 MCG (1000 UT) Tabs      Dose: 1,000 Units  Take 1,000 Units by mouth At Bedtime  Refills: 0     latanoprost 0.005 % ophthalmic solution  Commonly known as: XALATAN      Dose: 1 drop  Place 1 drop into both eyes At Bedtime  Refills: 3     lisinopril 10 MG tablet  Commonly known as: ZESTRIL  Used for: HTN (hypertension)      Dose: 10 mg  Take 1 tablet (10 mg) by mouth daily  Quantity: 90 tablet  Refills: 1     omega 3 1200 MG Caps      Dose: 2 tablet  [OMEGA-3 FATTY ACIDS-FISH OIL (OMEGA 3 FISH OIL) 684-1,200 MG CPDR]  "Take 2 tablets by mouth every morning.  Refills: 0     propafenone 150 MG Tabs tablet  Commonly known as: RYTHMOL  Used for: Frequent unifocal premature ventricular contractions      Dose: 150 mg  [PROPAFENONE (RYTHMOL) 150 MG TABLET] Take 1 tablet (150 mg total) by mouth 2 (two) times a day.  Quantity: 180 tablet  Refills: 3           Where to get your medicines      These medications were sent to [place] [id number] [facility] - [place], [place] - [address]  [address] , [place] [address]    Phone: [phone]     oxyCODONE 5 MG tablet                     Discharge Procedure Orders   Reason for your hospital stay   Order Comments: Left arm fracture     Follow-up and recommended labs and tests    Order Comments: Follow up with primary care provider, Donna Buchanan, within 7 days for hospital follow- up.     Activity   Order Comments: Your activity upon discharge: Nonweightbearing left upper extremity     Order Specific Question Answer Comments   Is discharge order? Yes      Discharge Instructions   Order Comments: Remove sutures in 5-7 days  Followup with [facility] in 1-2 weeks at [facility]     Diet   Order Comments: Follow this diet upon discharge: Orders Placed This Encounter      Combination Diet Regular Diet Adult     Order Specific Question Answer Comments   Is discharge order? Yes          Subjective       NAD. Denies any nausea, vomiting, abdominal pain, chest pain, SOB, new swelling, fevers, chills, confusion or headache. Pain controlled    Examination      Vital Signs in last 24 hours:   Vital signs:  Temp: 98  F (36.7  C) Temp src: Oral BP: (!) 107/39 (nurse notified) Pulse: 77   Resp: 16 SpO2: 97 % O2 Device: None (Room air)   Height: 162.6 cm (5' 4\") Weight: 70.1 kg (154 lb 7 oz)  Estimated body mass index is 26.51 kg/m  as calculated from the following:    Height as of this encounter: 1.626 m (5' 4\").    Weight as of this encounter: 70.1 kg (154 lb 7 " oz).            General: NAD  HEENT: Without congestion or inflammation  RESPIRATORY: Respirations nonlabored  MUSCULOSKELETAL: LUE in brace  NEUROLOGIC: Motor grossly intact, speech is clear      Please see EMR for more detailed significant labs, imaging, consultant notes etc.  Total time spent on discharge: >30 minutes    Bill Carmichael D.O.        CC:Donna Buchanan

## 2021-09-05 NOTE — PROGRESS NOTES
S: Alexandra Menezes was seen at [facility], Room P1 110-01, for a Left Humeral Fracture Orthosis.    Dx: Left Humeral Fracture    O:  The patient s bicep circumference is 15   inches.  The patient was fit with a Size Large Lateral shell and medium medial shell (cuff) of a [employer] Humeral Fracture Orthosis.  The large medial cuff prevented articulation of the elbow.  The modified brace allowed the patient s elbow to have a greater range of motion, better humeral compression, and better overall comfort.    A: I explained the purpose and function of the orthosis. I demonstrated the donning and doffing to the patient.      P: The orthosis should be worn according to the physician s directions.  Please contact the [facility] at [phone] if you have any questions.  Thank you, Jimbo    Note electronically signed by Jimbo Perez CPO, LPO

## 2021-09-05 NOTE — PROGRESS NOTES
Patient states that pain level decreased to 2/10.  CMS on left forearm/hand is intact from below the elbow. Orthosis brace is in place. Continue to monitor and update.

## 2021-09-05 NOTE — PLAN OF CARE
Patient is discharged to home. Patient has left arm is orthosis brace in place. She report that brace feels comfortable. CMS is intact. Patient has all her belongings. She received discharge instructions and her questions answered. Patient's son Derek  is transporting patient to home.

## 2021-09-05 NOTE — PLAN OF CARE
Problem: Adult Inpatient Plan of Care  Goal: Plan of Care Review  9/5/2021 0453 by Breanne Romeo RN  Outcome: Improving  9/4/2021 2216 by Breanne Romeo RN  Outcome: Improving     Problem: Adult Inpatient Plan of Care  Goal: Absence of Hospital-Acquired Illness or Injury  9/5/2021 0453 by Breanne Romeo RN  Outcome: Improving  9/4/2021 2216 by Breanne Romeo RN  Outcome: Improving  Intervention: Identify and Manage Fall Risk  Recent Flowsheet Documentation  Taken 9/4/2021 2321 by Breanne Romeo RN  Safety Promotion/Fall Prevention: activity supervised  Taken 9/4/2021 1942 by Breanne Romeo RN  Safety Promotion/Fall Prevention: activity supervised  Intervention: Prevent Skin Injury  Recent Flowsheet Documentation  Taken 9/4/2021 2321 by Breanne Romeo RN  Body Position: position changed independently  Taken 9/4/2021 1942 by Breanne Romeo RN  Body Position: position changed independently     Problem: Adult Inpatient Plan of Care  Goal: Absence of Hospital-Acquired Illness or Injury  Intervention: Prevent Skin Injury  Recent Flowsheet Documentation  Taken 9/4/2021 2321 by Breanne Romeo RN  Body Position: position changed independently  Taken 9/4/2021 1942 by Breanne Romeo RN  Body Position: position changed independently     Problem: Adult Inpatient Plan of Care  Goal: Optimal Comfort and Wellbeing  9/5/2021 0453 by Breanne Romeo RN  Outcome: Improving  9/4/2021 2216 by Breanne Romeo RN  Outcome: Improving     Problem: Adult Inpatient Plan of Care  Goal: Readiness for Transition of Care  9/5/2021 0453 by Breanne Romeo RN  Outcome: Improving  9/4/2021 2216 by Breanne Romeo RN  Outcome: Improving   A/o x 4. Denied pain. Up to the bathroom with SBA of 1. Awaiting orthosis brace. Will continued to monitor.

## 2021-09-06 NOTE — PROGRESS NOTES
[facility]      OUTPATIENT PHYSICAL THERAPY EVALUATION  PLAN OF TREATMENT FOR OUTPATIENT REHABILITATION  (COMPLETE FOR INITIAL CLAIMS ONLY)  Patient's Last Name, First Name, M.I.  YOB: 1940  MarkamairaniAlexandra LUNDY                        Provider's Name  [facility] Medical Record No.  [id number]                               Onset Date:      Start of Care Date:  (P) 09/04/21      Type:     _X_PT   ___OT   ___SLP Medical Diagnosis:  (P) humerus fracture                        PT Diagnosis:      Visits from SOC:  1   _________________________________________________________________________________  Plan of Treatment/Functional Goals    Planned Interventions:       Goals: See Physical Therapy Goals on Care Plan in [employer] electronic health record.    Therapy Frequency:    Predicted Duration of Therapy Intervention:    _________________________________________________________________________________    I CERTIFY THE NEED FOR THESE SERVICES FURNISHED UNDER        THIS PLAN OF TREATMENT AND WHILE UNDER MY CARE     (Physician co-signature of this document indicates review and certification of the therapy plan).                Certification date from: (P) 09/04/21, Certification date to: (P) 09/11/21                 Initial Assessment        See Physical Therapy evaluation dated (P) 09/04/21 in Epic electronic health record.

## 2021-09-07 LAB
ATRIAL RATE - MUSE: 56 BPM
DIASTOLIC BLOOD PRESSURE - MUSE: NORMAL MMHG
INTERPRETATION ECG - MUSE: NORMAL
P AXIS - MUSE: 60 DEGREES
PR INTERVAL - MUSE: 172 MS
QRS DURATION - MUSE: 96 MS
QT - MUSE: 442 MS
QTC - MUSE: 426 MS
R AXIS - MUSE: -19 DEGREES
SYSTOLIC BLOOD PRESSURE - MUSE: NORMAL MMHG
T AXIS - MUSE: -9 DEGREES
VENTRICULAR RATE- MUSE: 56 BPM

## 2021-09-07 NOTE — PLAN OF CARE
Occupational Therapy Discharge Summary    Reason for therapy discharge:    Discharged home.    Progress towards therapy goal(s). See goals on Care Plan in [facility] electronic health record for goal details.  Goals not met d/t limited OT prior to discharge.    Therapy recommendation(s):    Recommend continued OT.

## 2021-09-08 NOTE — PLAN OF CARE
Physical Therapy Discharge Summary    Reason for therapy discharge:    All goals and outcomes met, no further needs identified.    Progress towards therapy goal(s). See goals on Care Plan in [facility] electronic health record for goal details.  Goals met    Therapy recommendation(s):    No further therapy is recommended.    Renee Plascencia, PT

## 2021-09-08 NOTE — PROGRESS NOTES
Physical Therapy        09/04/21 1550   Quick Adds   Quick Adds Certification   Type of Visit Initial PT Evaluation   Living Environment   People in home alone   Living Environment Comments sister lives next door   Self-Care   Activity/Exercise/Self-Care Comment see OT comments   General Information   Onset of Illness/Injury or Date of Surgery 09/03/21   Referring Physician Lissette Mendez   Patient/Family Therapy Goals Statement (PT) go home   Pertinent History of Current Problem (include personal factors and/or comorbidities that impact the POC) left humerus fracture   Weight-Bearing Status - LUE nonweight-bearing   Weight-Bearing Status - RUE full weight-bearing   Weight-Bearing Status - LLE full weight-bearing   Weight-Bearing Status - RLE full weight-bearing   Cognition   Orientation Status (Cognition) oriented x 4   Affect/Mental Status (Cognition) WNL   Pain Assessment   Patient Currently in Pain Yes, see Vital Sign flowsheet   Posture    Posture Not impaired   Range of Motion (ROM)   ROM Comment WFL except LUE   Strength   Strength Comments WFL except LUE   Transfers   Transfer Safety Comments SBA   Gait/Stairs (Locomotion)   Distance in Feet (Required for LE Total Joints) 150', 150'   Comment (Gait/Stairs) see daily doc flowsheet   Sensory Examination   Sensory Perception Comments WFL except LUE   Coordination   Coordination Comments WFL except LUE   Muscle Tone   Muscle Tone no deficits were identified   Clinical Impression   Criteria for Skilled Therapeutic Intervention yes, treatment indicated   PT Diagnosis (PT) impaired ambulation   Influenced by the following impairments restricted extremity   Functional limitations due to impairments ADL dependence   Clinical Presentation Stable/Uncomplicated   Clinical Presentation Rationale presents as medically diagnosed   Clinical Decision Making (Complexity) moderate complexity   Therapy Frequency (PT) One time eval and treatment only   Predicted Duration of  Therapy Intervention (days/wks) once   Planned Therapy Interventions (PT) stair training;gait training   Anticipated Equipment Needs at Discharge (PT) cane, straight  (prn)   Risk & Benefits of therapy have been explained evaluation/treatment results reviewed;care plan/treatment goals reviewed;participants voiced agreement with care plan;participants included;patient;son   PT Discharge Planning    PT Discharge Recommendation (DC Rec) home with assist   PT Rationale for DC Rec sister and other family available any time. Safely ambulatory   Therapy Certification   Start of care date 09/04/21   Certification date from 09/04/21   Certification date to 09/11/21   Medical Diagnosis humerus fracture   Total Evaluation Time   Total Evaluation Time (Minutes) 10       Renee Plascencia DPT 9/8/2021

## 2021-09-16 ENCOUNTER — TRANSFERRED RECORDS (OUTPATIENT)
Dept: HEALTH INFORMATION MANAGEMENT | Facility: CLINIC | Age: 81
End: 2021-09-16

## 2021-09-28 ENCOUNTER — TRANSFERRED RECORDS (OUTPATIENT)
Dept: HEALTH INFORMATION MANAGEMENT | Facility: CLINIC | Age: 81
End: 2021-09-28

## 2021-09-30 ENCOUNTER — TRANSFERRED RECORDS (OUTPATIENT)
Dept: HEALTH INFORMATION MANAGEMENT | Facility: CLINIC | Age: 81
End: 2021-09-30

## 2021-10-16 ENCOUNTER — HEALTH MAINTENANCE LETTER (OUTPATIENT)
Age: 81
End: 2021-10-16

## 2021-11-29 ENCOUNTER — HOSPITAL ENCOUNTER (OUTPATIENT)
Dept: CARDIOLOGY | Facility: HOSPITAL | Age: 81
Discharge: HOME | End: 2021-11-29
Attending: INTERNAL MEDICINE | Admitting: INTERNAL MEDICINE
Payer: COMMERCIAL

## 2021-11-29 DIAGNOSIS — I49.3 FREQUENT UNIFOCAL PREMATURE VENTRICULAR CONTRACTIONS: ICD-10-CM

## 2021-11-29 DIAGNOSIS — R94.31 ABNORMAL ELECTROCARDIOGRAM (ECG) (EKG): ICD-10-CM

## 2021-11-29 PROCEDURE — 93321 DOPPLER ECHO F-UP/LMTD STD: CPT | Mod: 26 | Performed by: INTERNAL MEDICINE

## 2021-11-29 PROCEDURE — 93325 DOPPLER ECHO COLOR FLOW MAPG: CPT | Mod: 26 | Performed by: INTERNAL MEDICINE

## 2021-11-29 PROCEDURE — 93350 STRESS TTE ONLY: CPT | Mod: 26 | Performed by: INTERNAL MEDICINE

## 2021-11-29 PROCEDURE — 93352 ADMIN ECG CONTRAST AGENT: CPT | Performed by: INTERNAL MEDICINE

## 2021-11-29 PROCEDURE — 255N000002 HC RX 255 OP 636: Performed by: INTERNAL MEDICINE

## 2021-11-29 PROCEDURE — C8928 TTE W OR W/O FOL W/CON,STRES: HCPCS

## 2021-11-29 PROCEDURE — 93016 CV STRESS TEST SUPVJ ONLY: CPT | Performed by: GENERAL ACUTE CARE HOSPITAL

## 2021-11-29 PROCEDURE — 93018 CV STRESS TEST I&R ONLY: CPT | Performed by: INTERNAL MEDICINE

## 2021-11-29 RX ADMIN — PERFLUTREN 4 ML: 6.52 INJECTION, SUSPENSION INTRAVENOUS at 10:58

## 2021-12-01 DIAGNOSIS — I49.3 PVC'S (PREMATURE VENTRICULAR CONTRACTIONS): ICD-10-CM

## 2021-12-01 DIAGNOSIS — R94.31 ABNORMAL ELECTROCARDIOGRAM (ECG) (EKG): Primary | ICD-10-CM

## 2021-12-01 DIAGNOSIS — R94.39 ABNORMAL CARDIOVASCULAR STRESS TEST: ICD-10-CM

## 2021-12-06 DIAGNOSIS — R94.39 ABNORMAL CARDIOVASCULAR STRESS TEST: Primary | ICD-10-CM

## 2022-01-04 ENCOUNTER — HOSPITAL ENCOUNTER (OUTPATIENT)
Dept: CT IMAGING | Facility: CLINIC | Age: 82
Discharge: HOME | End: 2022-01-04
Attending: INTERNAL MEDICINE | Admitting: INTERNAL MEDICINE
Payer: COMMERCIAL

## 2022-01-04 VITALS
WEIGHT: 154 LBS | BODY MASS INDEX: 26.29 KG/M2 | HEIGHT: 64 IN | SYSTOLIC BLOOD PRESSURE: 119 MMHG | DIASTOLIC BLOOD PRESSURE: 50 MMHG

## 2022-01-04 DIAGNOSIS — R94.39 ABNORMAL CARDIOVASCULAR STRESS TEST: ICD-10-CM

## 2022-01-04 DIAGNOSIS — R94.39 ABNORMAL CARDIOVASCULAR STRESS TEST: Primary | ICD-10-CM

## 2022-01-04 LAB
BSA FOR ECHO PROCEDURE: 0 M2
CREAT BLD-MCNC: 1 MG/DL (ref 0.6–1.1)
CV CALCIUM SCORE AGATSTON LM: 0
CV CALCIUM SCORING AGATSON LAD: 35
CV CALCIUM SCORING AGATSTON CX: 0
CV CALCIUM SCORING AGATSTON RCA: 0
CV CALCIUM SCORING AGATSTON TOTAL: 35
GFR SERPL CREATININE-BSD FRML MDRD: 57 ML/MIN/1.73M2

## 2022-01-04 PROCEDURE — 75574 CT ANGIO HRT W/3D IMAGE: CPT | Mod: 26 | Performed by: INTERNAL MEDICINE

## 2022-01-04 PROCEDURE — 82565 ASSAY OF CREATININE: CPT

## 2022-01-04 PROCEDURE — 250N000013 HC RX MED GY IP 250 OP 250 PS 637: Performed by: INTERNAL MEDICINE

## 2022-01-04 PROCEDURE — 250N000011 HC RX IP 250 OP 636: Performed by: INTERNAL MEDICINE

## 2022-01-04 PROCEDURE — 75574 CT ANGIO HRT W/3D IMAGE: CPT

## 2022-01-04 RX ORDER — NITROGLYCERIN 0.4 MG/1
0.4 TABLET SUBLINGUAL ONCE
Status: COMPLETED | OUTPATIENT
Start: 2022-01-04 | End: 2022-01-04

## 2022-01-04 RX ORDER — ATORVASTATIN CALCIUM 20 MG/1
20 TABLET, FILM COATED ORAL DAILY
Qty: 90 TABLET | Refills: 1 | Status: SHIPPED | OUTPATIENT
Start: 2022-01-04 | End: 2022-05-24

## 2022-01-04 RX ORDER — IOPAMIDOL 755 MG/ML
100 INJECTION, SOLUTION INTRAVASCULAR ONCE
Status: COMPLETED | OUTPATIENT
Start: 2022-01-04 | End: 2022-01-04

## 2022-01-04 RX ORDER — METOPROLOL TARTRATE 1 MG/ML
5-20 INJECTION, SOLUTION INTRAVENOUS
Status: DISCONTINUED | OUTPATIENT
Start: 2022-01-04 | End: 2022-01-05 | Stop reason: HOSPADM

## 2022-01-04 RX ORDER — DILTIAZEM HYDROCHLORIDE 5 MG/ML
10 INJECTION INTRAVENOUS
Status: DISCONTINUED | OUTPATIENT
Start: 2022-01-04 | End: 2022-01-05 | Stop reason: HOSPADM

## 2022-01-04 RX ORDER — LIDOCAINE 40 MG/G
CREAM TOPICAL
Status: DISCONTINUED | OUTPATIENT
Start: 2022-01-04 | End: 2022-01-05 | Stop reason: HOSPADM

## 2022-01-04 RX ORDER — DILTIAZEM HYDROCHLORIDE 5 MG/ML
5-25 INJECTION INTRAVENOUS
Status: DISCONTINUED | OUTPATIENT
Start: 2022-01-04 | End: 2022-01-05 | Stop reason: HOSPADM

## 2022-01-04 RX ADMIN — NITROGLYCERIN 0.4 MG: 0.4 TABLET SUBLINGUAL at 08:57

## 2022-01-04 RX ADMIN — IOPAMIDOL 100 ML: 755 INJECTION, SOLUTION INTRAVENOUS at 09:04

## 2022-01-04 ASSESSMENT — MIFFLIN-ST. JEOR: SCORE: 1158.54

## 2022-01-09 DIAGNOSIS — I49.3 FREQUENT UNIFOCAL PREMATURE VENTRICULAR CONTRACTIONS: Primary | ICD-10-CM

## 2022-01-09 DIAGNOSIS — I10 HTN (HYPERTENSION): ICD-10-CM

## 2022-01-11 RX ORDER — LISINOPRIL 10 MG/1
TABLET ORAL
Qty: 90 TABLET | Refills: 0 | Status: SHIPPED | OUTPATIENT
Start: 2022-01-11 | End: 2022-04-06

## 2022-01-28 ENCOUNTER — OFFICE VISIT (OUTPATIENT)
Dept: CARDIOLOGY | Facility: CLINIC | Age: 82
End: 2022-01-28
Attending: INTERNAL MEDICINE
Payer: COMMERCIAL

## 2022-01-28 VITALS
HEART RATE: 65 BPM | WEIGHT: 160 LBS | BODY MASS INDEX: 27.46 KG/M2 | OXYGEN SATURATION: 99 % | DIASTOLIC BLOOD PRESSURE: 68 MMHG | SYSTOLIC BLOOD PRESSURE: 120 MMHG | RESPIRATION RATE: 16 BRPM

## 2022-01-28 DIAGNOSIS — I49.3 FREQUENT UNIFOCAL PREMATURE VENTRICULAR CONTRACTIONS: Primary | ICD-10-CM

## 2022-01-28 DIAGNOSIS — I25.10 CORONARY ARTERY DISEASE INVOLVING NATIVE CORONARY ARTERY OF NATIVE HEART WITHOUT ANGINA PECTORIS: ICD-10-CM

## 2022-01-28 PROCEDURE — 99213 OFFICE O/P EST LOW 20 MIN: CPT | Performed by: INTERNAL MEDICINE

## 2022-01-28 NOTE — PROGRESS NOTES
ELECTROPHYSIOLOGY NOTE    Today I had the opportunity to see  Alexandra Menezes for follow-up evaluation of frequent ventricular premature beat.      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    Plan:  Continue current medications  Fasting lipid profile could be obtained from your primary care physician  Call if troublesome palpitations or shortness of breath  Follow up appointment:   Dr. Smith in 1 year         History of Present Illness     Alexandra Menezes is a 81 year old female with frequent ventricular premature beats previously associated with symptoms of fatigue and palpitations. In 2008 she had frequent ventricular premature beats, up to 17,000 over 24 hours.  PVC morphology was consistent with left ventricular origin probably the posterior papillary muscle.  Rythmol 225 mg 3 times a day has been very successful in suppressing this ventricular ectopy. She has had no evidence for PVC-induced cardiomyopathy. Coronary angiography showed normal coronaries in December 2012.  Stress echo 1/17/2017 showed normal left ventricular size and function and no evidence for inducible ischemia.  Propafenone was decreased to 225 mg twice daily from 3 times daily on October 2, 2019.  Holter monitor several days later showed only 4 PVCs.  Stress echocardiogram 11/6/2021 showed some anterior and anterior lateral hypokinesia.  There were occasional ventricular premature beats and couplets noted  during the stress test.  Subsequent CTA showed a calcium score of 35 and a 20 to 30% LAD lesion.  Alexandra is felt well.  She denies troublesome palpitations lightheadedness or syncope.  There is no exertional chest pain or dyspnea on exertion.  She recently started atorvastatin 20 mg daily.  Personally reviewed.  Records above       Physical Examination Review of Systems   There were no vitals taken for this visit.  There is no height or weight on file to calculate BMI.  Wt Readings from Last 3 Encounters:   01/04/22 69.9 kg (154 lb)   09/03/21 70.1 kg (154 lb 7 oz)   05/24/21 72.6 kg (160 lb)        Appearance:   no distress,    HEENT:   no scleral icterus, normal conjunctivae    Neck: no carotid bruits or thyromegaly   Chest/Lungs:   lungs are clear to auscultation, no rales or wheezing,    Cardiovascular:   Jugular venous pressure less than 5 cm, Apical pulse is quite regular without extrasystoles. Normal S1,S2 with no murmurs or gallops,   Abdomen:  no  Hepatosplenomegaly., nontender,  bowel sounds are present   Extremities: no cyanosis or clubbing, No edema   Skin: no xanthelasma, warm.    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    Past Surgical History:   Procedure Laterality Date     BREAST SURGERY Right 1992    Mastectomy for Ca     MASTECTOMY Right 1992     PHACOEMULSIFICATION CLEAR CORNEA WITH STANDARD INTRAOCULAR LENS  IMPLANT Bilateral      REPLACEMENT TOTAL KNEE Left 3/21/2019    Procedure: LEFT TOTAL KNEE ARTHROPLASTY,COMPUTER-ASSIST;  Surgeon: Fede Robert MD;  Location: [facility] Main OR;  Service: Orthopedics    Family History   Problem Relation Age of Onset     Rheumatoid Arthritis Mother      Heart Disease Brother      CABG Brother      Heart Disease Maternal Grandmother      Valvular heart disease Father      Hyperthyroidism Sister      Heart Disease Sister      Clotting Disorder No family hx of     Social History     Socioeconomic History     Marital status:      Spouse name: Not on file     Number of children: Not on file     Years of education: Not on file     Highest education level: Not on file   Occupational History     Not on file   Tobacco Use     Smoking status: Never Smoker     Smokeless tobacco: Never Used   Substance and Sexual Activity     Alcohol use: Yes     Alcohol/week: 2.0 standard drinks     Drug use: No     Sexual activity: Never   Other Topics Concern     Not on file   Social History Narrative     Not on file     Social Determinants of Health     Financial Resource Strain: Not on file   Food Insecurity: Not on file   Transportation Needs: Not on file   Physical Activity: Not on file   Stress: Not on file   Social Connections: Not on file   Intimate Partner Violence: Not on file   Housing Stability: Not on file          Medications  Allergies   Current Outpatient Medications   Medication Sig Dispense Refill     acetaminophen (TYLENOL) 500 MG tablet Take 500-1,000 mg by mouth every 6 hours as needed for mild pain       atorvastatin (LIPITOR) 20 MG tablet Take 1 tablet (20 mg) by mouth daily 90 tablet 1     calcium carbonate-vitamin D3 (CALCIUM 600 + D,3,) 600 mg(1,500mg) -400 unit per tablet Take 2 tablets by mouth At Bedtime        cholecalciferol, vitamin D3, 1,000 unit tablet Take 1,000 Units by mouth At Bedtime        latanoprost (XALATAN) 0.005 % ophthalmic solution Place 1 drop into  both eyes At Bedtime   3     levothyroxine (SYNTHROID/LEVOTHROID) 25 MCG tablet TAKE 1 TABLET BY MOUTH DAILY AT 6 AM (Patient taking differently: Take 25 mcg by mouth every evening ) 90 tablet 2     lisinopril (ZESTRIL) 10 MG tablet TAKE 1 TABLET BY MOUTH EVERY DAY 90 tablet 0     omega-3 fatty acids-fish oil (OMEGA 3 FISH OIL) 684-1,200 mg CpDR [OMEGA-3 FATTY ACIDS-FISH OIL (OMEGA 3 FISH OIL) 684-1,200 MG CPDR] Take 2 tablets by mouth every morning.       oxyCODONE (ROXICODONE) 5 MG tablet Take 1 tablet (5 mg) by mouth every 6 hours as needed for breakthrough pain 10 tablet 0     propafenone (RYTHMOL) 150 MG tablet [PROPAFENONE (RYTHMOL) 150 MG TABLET] Take 1 tablet (150 mg total) by mouth 2 (two) times a day. 180 tablet 3      No Known Allergies

## 2022-01-28 NOTE — LETTER
1/28/2022    Donna Buchanan MD  [address]  [facility] [address]    RE: Alexandra Menezes       Dear Colleague,     I had the pleasure of seeing Alexandra Menezes in the [facility].           ELECTROPHYSIOLOGY NOTE    Today I had the opportunity to see  Alexandra Menezes for follow-up evaluation of frequent ventricular premature beat.      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    Plan:  Continue current medications  Fasting lipid profile could be obtained from your primary care physician  Call if troublesome palpitations or shortness of breath  Follow up appointment:   Dr. Smith in 1 year         History of Present Illness     Alexandra Menezes is a 81 year old female with frequent ventricular premature beats previously associated with symptoms of fatigue and palpitations. In 2008 she had frequent ventricular premature beats, up to 17,000 over 24 hours.  PVC morphology was consistent with left ventricular origin probably the posterior papillary muscle.  Rythmol 225 mg 3 times a day has been very successful in suppressing this ventricular ectopy. She has had no evidence for PVC-induced cardiomyopathy. Coronary angiography showed normal coronaries in December 2012.  Stress echo 1/17/2017 showed normal left ventricular size and function and no evidence for inducible ischemia.  Propafenone was decreased to 225 mg twice daily from 3 times daily on October 2, 2019.   Holter monitor several days later showed only 4 PVCs.  Stress echocardiogram 11/6/2021 showed some anterior and anterior lateral hypokinesia.  There were occasional ventricular premature beats and couplets noted during the stress test.  Subsequent CTA showed a calcium score of 35 and a 20 to 30% LAD lesion.  Alexandra is felt well.  She denies troublesome palpitations lightheadedness or syncope.  There is no exertional chest pain or dyspnea on exertion.  She recently started atorvastatin 20 mg daily.  Personally reviewed.  Records above       Physical Examination Review of Systems   There were no vitals taken for this visit.  There is no height or weight on file to calculate BMI.  Wt Readings from Last 3 Encounters:   01/04/22 69.9 kg (154 lb)   09/03/21 70.1 kg (154 lb 7 oz)   05/24/21 72.6 kg (160 lb)        Appearance:   no distress,    HEENT:   no scleral icterus, normal conjunctivae    Neck: no carotid bruits or thyromegaly   Chest/Lungs:   lungs are clear to auscultation, no rales or wheezing,    Cardiovascular:   Jugular venous pressure less than 5 cm, Apical pulse is quite regular without extrasystoles. Normal S1,S2 with no murmurs or gallops,   Abdomen:  no  Hepatosplenomegaly., nontender,  bowel sounds are present   Extremities: no cyanosis or clubbing, No edema   Skin: no xanthelasma, warm.    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  june 2015    Past Surgical History:   Procedure Laterality Date     BREAST SURGERY Right 1992    Mastectomy for Ca     MASTECTOMY Right 1992     PHACOEMULSIFICATION CLEAR CORNEA WITH STANDARD INTRAOCULAR LENS IMPLANT Bilateral      REPLACEMENT TOTAL KNEE Left 3/21/2019    Procedure: LEFT TOTAL KNEE ARTHROPLASTY,COMPUTER-ASSIST;  Surgeon: Fede Robert MD;  Location: [facility] OR;  Service: Orthopedics    Family History   Problem Relation Age of Onset     Rheumatoid Arthritis Mother      Heart Disease Brother      CABG Brother      Heart Disease Maternal Grandmother      Valvular heart disease Father      Hyperthyroidism Sister      Heart Disease Sister      Clotting Disorder No family hx of     Social History     Socioeconomic History     Marital status:      Spouse name: Not on file     Number of children: Not on file     Years of education: Not on file     Highest education level: Not on file   Occupational History     Not on file   Tobacco Use     Smoking status: Never Smoker     Smokeless tobacco: Never Used   Substance and Sexual Activity     Alcohol use: Yes     Alcohol/week: 2.0 standard drinks     Drug use: No     Sexual activity: Never   Other Topics Concern     Not on file   Social History Narrative     Not on file     Social Determinants of Health     Financial Resource Strain: Not on file   Food Insecurity: Not on file   Transportation Needs: Not on file   Physical Activity: Not on file   Stress: Not on file   Social Connections: Not on file   Intimate Partner Violence: Not on file   Housing Stability: Not on file          Medications  Allergies   Current Outpatient Medications   Medication Sig Dispense Refill     acetaminophen (TYLENOL) 500 MG tablet Take 500-1,000 mg by mouth every 6 hours as needed for mild pain       atorvastatin (LIPITOR) 20 MG tablet Take 1 tablet (20 mg) by mouth daily 90 tablet 1     calcium carbonate-vitamin D3 (CALCIUM 600 + D,3,) 600 mg(1,500mg) -400 unit per  tablet Take 2 tablets by mouth At Bedtime        cholecalciferol, vitamin D3, 1,000 unit tablet Take 1,000 Units by mouth At Bedtime        latanoprost (XALATAN) 0.005 % ophthalmic solution Place 1 drop into both eyes At Bedtime   3     levothyroxine (SYNTHROID/LEVOTHROID) 25 MCG tablet TAKE 1 TABLET BY MOUTH DAILY AT 6 AM (Patient taking differently: Take 25 mcg by mouth every evening ) 90 tablet 2     lisinopril (ZESTRIL) 10 MG tablet TAKE 1 TABLET BY MOUTH EVERY DAY 90 tablet 0     omega-3 fatty acids-fish oil (OMEGA 3 FISH OIL) 684-1,200 mg CpDR [OMEGA-3 FATTY ACIDS-FISH OIL (OMEGA 3 FISH OIL) 684-1,200 MG CPDR] Take 2 tablets by mouth every morning.       oxyCODONE (ROXICODONE) 5 MG tablet Take 1 tablet (5 mg) by mouth every 6 hours as needed for breakthrough pain 10 tablet 0     propafenone (RYTHMOL) 150 MG tablet [PROPAFENONE (RYTHMOL) 150 MG TABLET] Take 1 tablet (150 mg total) by mouth 2 (two) times a day. 180 tablet 3      No Known Allergies              Thank you for allowing me to participate in the care of your patient.      Sincerely,     Aakash Smith MD     [facility]  cc:   Aakash Smith MD  [address]  [facility] [place] [address]

## 2022-01-28 NOTE — PATIENT INSTRUCTIONS
Alexandra Menezes    It was a pleasure to see you today for a clinic visit.    Continue current medications  Fasting lipid profile could be obtained from your primary care physician  Call if troublesome palpitations or shortness of breath  Follow up appointment:   Dr. Smith in 1 year    Contact EP Nurse Clinicians at [phone] with questions or concerns.    Aakash Smith MD

## 2022-02-08 ENCOUNTER — TRANSFERRED RECORDS (OUTPATIENT)
Dept: HEALTH INFORMATION MANAGEMENT | Facility: CLINIC | Age: 82
End: 2022-02-08
Payer: COMMERCIAL

## 2022-02-21 DIAGNOSIS — I49.3 FREQUENT UNIFOCAL PREMATURE VENTRICULAR CONTRACTIONS: ICD-10-CM

## 2022-02-21 RX ORDER — PROPAFENONE HYDROCHLORIDE 150 MG/1
150 TABLET, COATED ORAL 2 TIMES DAILY
Qty: 180 TABLET | Refills: 3 | Status: SHIPPED | OUTPATIENT
Start: 2022-02-21 | End: 2023-02-24

## 2022-03-23 ENCOUNTER — TRANSFERRED RECORDS (OUTPATIENT)
Dept: HEALTH INFORMATION MANAGEMENT | Facility: CLINIC | Age: 82
End: 2022-03-23
Payer: COMMERCIAL

## 2022-04-07 DIAGNOSIS — E03.9 HYPOTHYROIDISM: ICD-10-CM

## 2022-04-08 RX ORDER — LEVOTHYROXINE SODIUM 25 UG/1
TABLET ORAL
Qty: 90 TABLET | Refills: 2 | Status: SHIPPED | OUTPATIENT
Start: 2022-04-08 | End: 2023-01-10

## 2022-04-08 NOTE — TELEPHONE ENCOUNTER
"Routing refill request to provider for review/approval because:  Labs not current:  TSH  Patient needs to be seen because it has been more than 1 year since last office visit.    Last Written Prescription Date:  7/17/2021  Last Fill Quantity: 90,  # refills: 2   Last office visit provider:  3/24/2021     Requested Prescriptions   Pending Prescriptions Disp Refills     levothyroxine (SYNTHROID/LEVOTHROID) 25 MCG tablet [Pharmacy Med Name: LEVOTHYROXINE 25 MCG TABLET] 90 tablet 2     Sig: TAKE 1 TABLET BY MOUTH DAILY AT 6 AM       Thyroid Protocol Failed - 4/7/2022 12:33 AM        Failed - Normal TSH on file in past 12 months     Recent Labs   Lab Test 03/24/21  1101   TSH 3.15              Passed - Patient is 12 years or older        Passed - Recent (12 mo) or future (30 days) visit within the authorizing provider's specialty     Patient has had an office visit with the authorizing provider or a provider within the authorizing providers department within the previous 12 mos or has a future within next 30 days. See \"Patient Info\" tab in inbasket, or \"Choose Columns\" in Meds & Orders section of the refill encounter.              Passed - Medication is active on med list        Passed - No active pregnancy on record     If patient is pregnant or has had a positive pregnancy test, please check TSH.          Passed - No positive pregnancy test in past 12 months     If patient is pregnant or has had a positive pregnancy test, please check TSH.               Belen Junior RN 04/08/22 7:36 AM  "

## 2022-04-17 PROBLEM — S42.352A CLOSED DISPLACED COMMINUTED FRACTURE OF SHAFT OF LEFT HUMERUS, INITIAL ENCOUNTER: Status: RESOLVED | Noted: 2021-09-03 | Resolved: 2022-04-17

## 2022-04-17 PROBLEM — S01.81XA FACIAL LACERATION, INITIAL ENCOUNTER: Status: RESOLVED | Noted: 2021-09-03 | Resolved: 2022-04-17

## 2022-04-17 PROBLEM — R79.89 ELEVATED PARATHYROID HORMONE: Status: ACTIVE | Noted: 2022-04-17

## 2022-04-17 PROBLEM — S22.32XA CLOSED FRACTURE OF ONE RIB OF LEFT SIDE, INITIAL ENCOUNTER: Status: RESOLVED | Noted: 2021-09-03 | Resolved: 2022-04-17

## 2022-05-10 ASSESSMENT — ACTIVITIES OF DAILY LIVING (ADL): CURRENT_FUNCTION: NO ASSISTANCE NEEDED

## 2022-05-12 ENCOUNTER — OFFICE VISIT (OUTPATIENT)
Dept: FAMILY MEDICINE | Facility: CLINIC | Age: 82
End: 2022-05-12
Payer: COMMERCIAL

## 2022-05-12 VITALS
TEMPERATURE: 97.8 F | WEIGHT: 160.8 LBS | SYSTOLIC BLOOD PRESSURE: 126 MMHG | OXYGEN SATURATION: 94 % | DIASTOLIC BLOOD PRESSURE: 70 MMHG | HEIGHT: 64 IN | RESPIRATION RATE: 16 BRPM | BODY MASS INDEX: 27.45 KG/M2 | HEART RATE: 80 BPM

## 2022-05-12 DIAGNOSIS — E03.9 HYPOTHYROIDISM, UNSPECIFIED TYPE: ICD-10-CM

## 2022-05-12 DIAGNOSIS — I25.10 CORONARY ARTERY DISEASE INVOLVING NATIVE CORONARY ARTERY OF NATIVE HEART WITHOUT ANGINA PECTORIS: ICD-10-CM

## 2022-05-12 DIAGNOSIS — I49.3 FREQUENT UNIFOCAL PREMATURE VENTRICULAR CONTRACTIONS: ICD-10-CM

## 2022-05-12 DIAGNOSIS — E78.00 PURE HYPERCHOLESTEROLEMIA: ICD-10-CM

## 2022-05-12 DIAGNOSIS — N18.31 STAGE 3A CHRONIC KIDNEY DISEASE (H): ICD-10-CM

## 2022-05-12 DIAGNOSIS — I10 ESSENTIAL HYPERTENSION: ICD-10-CM

## 2022-05-12 DIAGNOSIS — Z23 ENCOUNTER FOR IMMUNIZATION: ICD-10-CM

## 2022-05-12 DIAGNOSIS — Z00.00 MEDICARE ANNUAL WELLNESS VISIT, SUBSEQUENT: Primary | ICD-10-CM

## 2022-05-12 DIAGNOSIS — Z85.3 HISTORY OF BREAST CANCER: ICD-10-CM

## 2022-05-12 DIAGNOSIS — E55.9 VITAMIN D DEFICIENCY: ICD-10-CM

## 2022-05-12 DIAGNOSIS — Z78.0 ASYMPTOMATIC POSTMENOPAUSAL STATUS: ICD-10-CM

## 2022-05-12 DIAGNOSIS — R79.89 ELEVATED PARATHYROID HORMONE: ICD-10-CM

## 2022-05-12 LAB
ALBUMIN SERPL-MCNC: 4.1 G/DL (ref 3.5–5)
ALBUMIN UR-MCNC: NEGATIVE MG/DL
ALP SERPL-CCNC: 92 U/L (ref 45–120)
ALT SERPL W P-5'-P-CCNC: 22 U/L (ref 0–45)
ANION GAP SERPL CALCULATED.3IONS-SCNC: 12 MMOL/L (ref 5–18)
APPEARANCE UR: CLEAR
AST SERPL W P-5'-P-CCNC: 27 U/L (ref 0–40)
BACTERIA #/AREA URNS HPF: ABNORMAL /HPF
BILIRUB DIRECT SERPL-MCNC: 0.3 MG/DL
BILIRUB SERPL-MCNC: 0.6 MG/DL (ref 0–1)
BILIRUB UR QL STRIP: NEGATIVE
BUN SERPL-MCNC: 33 MG/DL (ref 8–28)
CALCIUM SERPL-MCNC: 10 MG/DL (ref 8.5–10.5)
CHLORIDE BLD-SCNC: 105 MMOL/L (ref 98–107)
CHOLEST SERPL-MCNC: 215 MG/DL
CO2 SERPL-SCNC: 24 MMOL/L (ref 22–31)
COLOR UR AUTO: YELLOW
CREAT SERPL-MCNC: 1.26 MG/DL (ref 0.6–1.1)
CREAT UR-MCNC: 34 MG/DL
ERYTHROCYTE [DISTWIDTH] IN BLOOD BY AUTOMATED COUNT: 13.4 % (ref 10–15)
FASTING STATUS PATIENT QL REPORTED: YES
GFR SERPL CREATININE-BSD FRML MDRD: 43 ML/MIN/1.73M2
GLUCOSE BLD-MCNC: 72 MG/DL (ref 70–125)
GLUCOSE UR STRIP-MCNC: NEGATIVE MG/DL
HCT VFR BLD AUTO: 41.9 % (ref 35–47)
HDLC SERPL-MCNC: 90 MG/DL
HGB BLD-MCNC: 13.4 G/DL (ref 11.7–15.7)
HGB UR QL STRIP: NEGATIVE
KETONES UR STRIP-MCNC: NEGATIVE MG/DL
LDLC SERPL CALC-MCNC: 113 MG/DL
LEUKOCYTE ESTERASE UR QL STRIP: ABNORMAL
MCH RBC QN AUTO: 29.5 PG (ref 26.5–33)
MCHC RBC AUTO-ENTMCNC: 32 G/DL (ref 31.5–36.5)
MCV RBC AUTO: 92 FL (ref 78–100)
MICROALBUMIN UR-MCNC: 0.58 MG/DL (ref 0–1.99)
MICROALBUMIN/CREAT UR: 17.1 MG/G CR
NITRATE UR QL: NEGATIVE
PH UR STRIP: 5 [PH] (ref 5–8)
PHOSPHATE SERPL-MCNC: 3.8 MG/DL (ref 2.5–4.5)
PLATELET # BLD AUTO: 208 10E3/UL (ref 150–450)
POTASSIUM BLD-SCNC: 4.6 MMOL/L (ref 3.5–5)
PROT SERPL-MCNC: 6.6 G/DL (ref 6–8)
PTH-INTACT SERPL-MCNC: 107 PG/ML (ref 10–86)
RBC # BLD AUTO: 4.54 10E6/UL (ref 3.8–5.2)
RBC #/AREA URNS AUTO: ABNORMAL /HPF
SODIUM SERPL-SCNC: 141 MMOL/L (ref 136–145)
SP GR UR STRIP: 1.01 (ref 1–1.03)
SQUAMOUS #/AREA URNS AUTO: ABNORMAL /LPF
TRIGL SERPL-MCNC: 59 MG/DL
TSH SERPL DL<=0.005 MIU/L-ACNC: 2.25 UIU/ML (ref 0.3–5)
UROBILINOGEN UR STRIP-ACNC: 0.2 E.U./DL
WBC # BLD AUTO: 4 10E3/UL (ref 4–11)
WBC #/AREA URNS AUTO: ABNORMAL /HPF

## 2022-05-12 PROCEDURE — 84443 ASSAY THYROID STIM HORMONE: CPT | Performed by: FAMILY MEDICINE

## 2022-05-12 PROCEDURE — 83970 ASSAY OF PARATHORMONE: CPT | Performed by: FAMILY MEDICINE

## 2022-05-12 PROCEDURE — 81001 URINALYSIS AUTO W/SCOPE: CPT | Performed by: FAMILY MEDICINE

## 2022-05-12 PROCEDURE — 91306 COVID-19,PF,MODERNA (18+ YRS BOOSTER .25ML): CPT | Performed by: FAMILY MEDICINE

## 2022-05-12 PROCEDURE — 82248 BILIRUBIN DIRECT: CPT | Performed by: FAMILY MEDICINE

## 2022-05-12 PROCEDURE — 36415 COLL VENOUS BLD VENIPUNCTURE: CPT | Performed by: FAMILY MEDICINE

## 2022-05-12 PROCEDURE — 85027 COMPLETE CBC AUTOMATED: CPT | Performed by: FAMILY MEDICINE

## 2022-05-12 PROCEDURE — 84100 ASSAY OF PHOSPHORUS: CPT | Performed by: FAMILY MEDICINE

## 2022-05-12 PROCEDURE — 82043 UR ALBUMIN QUANTITATIVE: CPT | Performed by: FAMILY MEDICINE

## 2022-05-12 PROCEDURE — 99214 OFFICE O/P EST MOD 30 MIN: CPT | Mod: 25 | Performed by: FAMILY MEDICINE

## 2022-05-12 PROCEDURE — 0064A COVID-19,PF,MODERNA (18+ YRS BOOSTER .25ML): CPT | Performed by: FAMILY MEDICINE

## 2022-05-12 PROCEDURE — 99397 PER PM REEVAL EST PAT 65+ YR: CPT | Mod: 25 | Performed by: FAMILY MEDICINE

## 2022-05-12 PROCEDURE — 80061 LIPID PANEL: CPT | Performed by: FAMILY MEDICINE

## 2022-05-12 PROCEDURE — 82306 VITAMIN D 25 HYDROXY: CPT | Performed by: FAMILY MEDICINE

## 2022-05-12 PROCEDURE — 80053 COMPREHEN METABOLIC PANEL: CPT | Performed by: FAMILY MEDICINE

## 2022-05-12 ASSESSMENT — ACTIVITIES OF DAILY LIVING (ADL): CURRENT_FUNCTION: NO ASSISTANCE NEEDED

## 2022-05-12 NOTE — PROGRESS NOTES
"SUBJECTIVE:   Alexandra Menezes is a 81 year old female who presents for Preventive Visit.      Patient has been advised of split billing requirements and indicates understanding: Yes  Are you in the first 12 months of your Medicare coverage?  No    Doing quite well from a health standpoint.  We review her diagnosis of chronic kidney disease stage IIIa, she remains on lisinopril and avoids nephrotoxic agents.  Remains on lisinopril management of hypertension as well.  Followed by Dr. Smith in regards to frequent PVCs, this is controlled with propafenone.  Known nonobstructive coronary artery disease not requiring intervention.  Was started on atorvastatin in January by Dr. Smith, due for follow-up cholesterol check.  History of hyperparathyroidism that is due for follow-up.  Doing well on levothyroxine and management of hypothyroidism, will due for follow-up of this.  She also has a history of vitamin D deficiency due for follow-up.  Bone density scan previously has been normal.  Prior history of breast cancer status post right mastectomy, has not had evidence of recurrence.  She is overdue for mammogram, she is considering whether or not she like to continue these.  She broke her left humerus in September, healing well without need for surgery.  She had carpal tunnel release on her left hand just over a month ago and is doing well with that.  Exercising by swimming 5 days/week and feeling great with this.  Doing well with her eating habits though struggling with some increased evening portion sizes and snacking.    Healthy Habits:     In general, how would you rate your overall health?  Good    Frequency of exercise:  4-5 days/week    Duration of exercise:  30-45 minutes    Do you usually eat at least 4 servings of fruit and vegetables a day, include whole grains    & fiber and avoid regularly eating high fat or \"junk\" foods?  Yes    Medication side effects:  None    Ability to successfully perform activities of " daily living:  No assistance needed    Home Safety:  Lack of grab bars in the bathroom    Hearing Impairment:  No hearing concerns    In the past 6 months, have you been bothered by leaking of urine?  No    In general, how would you rate your overall mental or emotional health?  Good      PHQ-2 Total Score: 1    Additional concerns today:  Yes    Do you feel safe in your environment? Yes    Have you ever done Advance Care Planning? (For example, a Health Directive, POLST, or a discussion with a medical provider or your loved ones about your wishes): Yes, advance care planning is on file.       Fall risk     Fall last September leading to left humerus fracture.  No other fears of falling and no other falls.  Cognitive Screening   1) Repeat 3 items (Leader, Season, Table)    2) Clock draw: NORMAL  3) 3 item recall: Recalls 3 objects  Results: 3 items recalled: COGNITIVE IMPAIRMENT LESS LIKELY    Mini-CogTM Copyright S Jeremias. Licensed by the author for use in [facility]; reprinted with permission (sotye@[facility]). All rights reserved.        Reviewed and updated as needed this visit by clinical staff   Tobacco  Allergies  Meds                Reviewed and updated as needed this visit by Provider                   Social History     Tobacco Use     Smoking status: Never Smoker     Smokeless tobacco: Never Used   Substance Use Topics     Alcohol use: Yes     Alcohol/week: 2.0 standard drinks         Alcohol Use 5/10/2022   Prescreen: >3 drinks/day or >7 drinks/week? No         Current providers sharing in care for this patient include:   Patient Care Team:  Donna Buchanan MD as PCP - General  Donna Buchanan MD as Assigned PCP  Aakash Smith MD as Assigned Heart and Vascular Provider  Bhupinder Terrell DPM as Assigned Musculoskeletal Provider    The following health maintenance items are reviewed in Epic and correct as of today:  Health Maintenance Due   Topic Date Due     MICROALBUMIN  Never done      ANNUAL REVIEW OF HM ORDERS  Never done     URINALYSIS  Never done     ZOSTER IMMUNIZATION (2 of 3) 02/25/2010     COVID-19 Vaccine (4 - Booster for Pfizer series) 02/22/2022     MEDICARE ANNUAL WELLNESS VISIT  03/24/2022     LIPID  03/24/2022     FALL RISK ASSESSMENT  03/24/2022     BP Readings from Last 3 Encounters:   05/12/22 126/70   01/28/22 120/68   01/04/22 119/50    Wt Readings from Last 3 Encounters:   05/12/22 72.9 kg (160 lb 12.8 oz)   01/28/22 72.6 kg (160 lb)   01/04/22 69.9 kg (154 lb)                  Patient Active Problem List   Diagnosis     Sinus bradycardia     Hypercholesterolemia     Hypothyroidism     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     Localized Osteoarthritis Of The Knee     History of breast cancer     Frequent unifocal premature ventricular contractions     Essential hypertension     Vitamin D deficiency     CKD (chronic kidney disease) stage 3, GFR 30-59 ml/min (H)     Coronary artery disease involving native coronary artery without angina pectoris     Elevated parathyroid hormone     Past Surgical History:   Procedure Laterality Date     BREAST SURGERY Right 1992    Mastectomy for Ca     MASTECTOMY Right 1992     PHACOEMULSIFICATION CLEAR CORNEA WITH STANDARD INTRAOCULAR LENS IMPLANT Bilateral      REPLACEMENT TOTAL KNEE Left 3/21/2019    Procedure: LEFT TOTAL KNEE ARTHROPLASTY,COMPUTER-ASSIST;  Surgeon: Fede Robert MD;  Location: [facility];  Service: Orthopedics       Social History     Tobacco Use     Smoking status: Never Smoker     Smokeless tobacco: Never Used   Substance Use Topics     Alcohol use: Yes     Alcohol/week: 2.0 standard drinks     Family History   Problem Relation Age of Onset     Rheumatoid Arthritis Mother      Heart Disease Brother      CABG Brother      Heart Disease Maternal Grandmother      Valvular heart disease Father      Hyperthyroidism Sister      Heart Disease Sister      Clotting Disorder No family hx of   "        Current Outpatient Medications   Medication Sig Dispense Refill     acetaminophen (TYLENOL) 500 MG tablet Take 500-1,000 mg by mouth every 6 hours as needed for mild pain       atorvastatin (LIPITOR) 20 MG tablet Take 1 tablet (20 mg) by mouth daily 90 tablet 1     calcium carbonate-vitamin D3 (CALCIUM 600 + D,3,) 600 mg(1,500mg) -400 unit per tablet Take 2 tablets by mouth At Bedtime        cholecalciferol 50 MCG (2000 UT) tablet Take 2,000 Units by mouth At Bedtime        latanoprost (XALATAN) 0.005 % ophthalmic solution Place 1 drop into both eyes At Bedtime   3     levothyroxine (SYNTHROID/LEVOTHROID) 25 MCG tablet TAKE 1 TABLET BY MOUTH DAILY AT 6 AM 90 tablet 2     lisinopril (ZESTRIL) 10 MG tablet TAKE 1 TABLET BY MOUTH EVERY DAY 90 tablet 2     omega-3 fatty acids-fish oil (OMEGA 3 FISH OIL) 684-1,200 mg CpDR [OMEGA-3 FATTY ACIDS-FISH OIL (OMEGA 3 FISH OIL) 684-1,200 MG CPDR] Take 2 tablets by mouth every morning.       propafenone (RYTHMOL) 150 MG TABS tablet Take 1 tablet (150 mg) by mouth 2 times daily 180 tablet 3     No Known Allergies  Pneumonia Vaccine: Up-to-date  Mammogram Screening: Mammogram Screening - Patient over age 75, has elected to discontinue screenings.  She suspects but is uncertain.  Pertinent mammograms are reviewed under the imaging tab.    Review of Systems  Constitutional, HEENT, cardiovascular, pulmonary, GI, , musculoskeletal, neuro, skin, endocrine and psych systems are negative, except as otherwise noted.    OBJECTIVE:   Resp 16   Ht 1.613 m (5' 3.5\")   Wt 72.9 kg (160 lb 12.8 oz)   BMI 28.04 kg/m   Estimated body mass index is 28.04 kg/m  as calculated from the following:    Height as of this encounter: 1.613 m (5' 3.5\").    Weight as of this encounter: 72.9 kg (160 lb 12.8 oz).  Physical Exam  GENERAL APPEARANCE: healthy, alert and no distress  EYES: Eyes grossly normal to inspection, PERRL and conjunctivae and sclerae normal  HENT: ear canals and TM's normal, " nose and mouth without ulcers or lesions, oropharynx clear and oral mucous membranes moist  NECK: no adenopathy, no asymmetry, masses, or scars and thyroid normal to palpation  RESP: lungs clear to auscultation - no rales, rhonchi or wheezes  BREAST: normal without masses, tenderness or nipple discharge and no palpable axillary masses or adenopathy  CV: regular rate and rhythm, normal S1 S2, no S3 or S4, no murmur, click or rub, no peripheral edema and peripheral pulses strong  ABDOMEN: soft, nontender, no hepatosplenomegaly, no masses and bowel sounds normal  MS: no musculoskeletal defects are noted and gait is age appropriate without ataxia  SKIN: no suspicious lesions or rashes  NEURO: Normal strength and tone, sensory exam grossly normal, mentation intact and speech normal  PSYCH: mentation appears normal and affect normal/bright    Diagnostic Test Results:  Labs reviewed in Epic    ASSESSMENT / PLAN:     Medicare annual wellness visit, subsequent  At today's visit, we discussed lifestyle interventions to promote self-management and wellness, including maintenance of a healthy weight, healthy diet, regular physical activity and exercise, and falls prevention.  COVID second booster administered today.  Encourage consideration of Shingrix, vaccines otherwise up-to-date.  Orders placed for screening bone density scan.  We will check fasting lipids and fasting glucose today.  Discussed mammogram screening, she is uncertain.    Stage 3a chronic kidney disease (H)  We reviewed nature of condition.  Avoid nephrotoxic agents.  Continue lisinopril.  Will check urinalysis as it is not clear this is been performed previously, and urine microalbumin today.  - Albumin Random Urine Quantitative with Creat Ratio; Future  - UA reflex to Microscopic and Culture; Future    Essential hypertension  Encourage efforts at healthy lifestyle habits.  Continue lisinopril.  Will check renal function and monitoring of this.    Coronary  "artery disease involving native coronary artery of native heart without angina pectoris  Nonobstructive, requires no intervention.  Followed by cardiology.  Continue atorvastatin.    Frequent unifocal premature ventricular contractions  Remains on propafenone under the direction of Dr. Smith.    Hypercholesterolemia  Tolerating atorvastatin well.  Encouraged healthy lifestyle habits.  We will check follow-up fasting lipids today.  - Lipid panel reflex to direct LDL Fasting; Future    Elevated parathyroid hormone  We will recheck parathyroid hormone level today along with vitamin D level and other electrolytes and her renal panel.  Order placed for future bone density scan.  - Parathyroid Hormone Intact; Future  - Renal panel; Future  - Vitamin D Deficiency; Future  - DX Hip/Pelvis/Spine; Future    Hypothyroidism, unspecified type  Continue levothyroxine.  Will check TSH today.  - TSH; Future    Vitamin D deficiency  Current daily supplement.  Will check vitamin D level today.  - Vitamin D Deficiency; Future    History of breast cancer  No evidence of recurrence.  She is uncertain if she would like to continue breast cancer screening.  We will check hepatic profile, CBC, and renal function to assess for risk of recurrence.  - Hepatic panel (Albumin, ALT, AST, Bili, Alk Phos, TP); Future  - CBC with platelets; Future    Asymptomatic postmenopausal status  Placed for screening bone density scan.  - DX Hip/Pelvis/Spine; Future    Encounter for immunization  - COVID-19,PF,MODERNA (18+ YRS BOOSTER .25ML)     Patient has been advised of split billing requirements and indicates understanding: Yes    COUNSELING:  Reviewed preventive health counseling, as reflected in patient instructions    Estimated body mass index is 28.04 kg/m  as calculated from the following:    Height as of this encounter: 1.613 m (5' 3.5\").    Weight as of this encounter: 72.9 kg (160 lb 12.8 oz).    Weight management plan: Discussed healthy diet and " exercise guidelines    She reports that she has never smoked. She has never used smokeless tobacco.      Appropriate preventive services were discussed with this patient, including applicable screening as appropriate for cardiovascular disease, diabetes, osteopenia/osteoporosis, and glaucoma.  As appropriate for age/gender, discussed screening for colorectal cancer, prostate cancer, breast cancer, and cervical cancer. Checklist reviewing preventive services available has been given to the patient.    Reviewed patients plan of care and provided an AVS. The Basic Care Plan (routine screening as documented in Health Maintenance) for Alexandra meets the Care Plan requirement. This Care Plan has been established and reviewed with the Patient.    Counseling Resources:  ATP IV Guidelines  Pooled Cohorts Equation Calculator  Breast Cancer Risk Calculator  Breast Cancer: Medication to Reduce Risk  FRAX Risk Assessment  ICSI Preventive Guidelines  Dietary Guidelines for Americans, 2010  USDA's MyPlate  ASA Prophylaxis  Lung CA Screening    Donna Buchanan MD  [facility]    Identified Health Risks:

## 2022-05-12 NOTE — PATIENT INSTRUCTIONS
Consider Shingrix (the new shingles vaccine, 2 dose series) to reduce your risk of shingles.  Check your insurance coverage first.     Patient Education   Personalized Prevention Plan  You are due for the preventive services outlined below.  Your care team is available to assist you in scheduling these services.  If you have already completed any of these items, please share that information with your care team to update in your medical record.  Health Maintenance Due   Topic Date Due    Kidney Microalbumin Urine Test  Never done    Urine Test  Never done    Zoster (Shingles) Vaccine (2 of 3) 02/25/2010    COVID-19 Vaccine (4 - Booster for Pfizer series) 02/22/2022    Cholesterol Lab  03/24/2022    FALL RISK ASSESSMENT  03/24/2022

## 2022-05-13 LAB — DEPRECATED CALCIDIOL+CALCIFEROL SERPL-MC: 66 UG/L (ref 20–75)

## 2022-05-23 DIAGNOSIS — R94.39 ABNORMAL CARDIOVASCULAR STRESS TEST: ICD-10-CM

## 2022-05-24 RX ORDER — ATORVASTATIN CALCIUM 20 MG/1
TABLET, FILM COATED ORAL
Qty: 90 TABLET | Refills: 1 | Status: SHIPPED | OUTPATIENT
Start: 2022-05-24 | End: 2022-12-13

## 2022-05-31 NOTE — PROGRESS NOTES
Alexandra is a 81 year old who is being evaluated via a billable telephone visit.      What phone number would you like to be contacted at? [phone]  How would you like to obtain your AVS? MyChart    Assessment & Plan     Infection due to 2019 novel coronavirus    30 minutes spent on the date of the encounter doing chart review, history and exam, documentation and further activities per the note     See Patient Instructions: Discussed using Flonase BID which can also help fight off viral infection.  Discussed if worsening sinus/ bronchitis symptoms in one week, we can sent in antibiotic treatment for her.  If significant worsening symptoms- SOB, fever, she should go in for in person evaluation. Pt it agreement.    No follow-ups on file.    QUAN Maynard  [facility]    Wen Morris is a 81 year old who presents for the following health issues     HPI       COVID-19 Symptom Review  How many days ago did these symptoms start?Saturday/Sunday 5/29/22- symptoms are improving.  Discussed that since her symptoms are improving, she is at the 5/6 day marlene for symptom onset, she is vaccinated against COVID and she has medications that cause interaction with the antivirals for COVID (lipitor and propafenone), GFR 43, she is probably best to not take antiviral treatment at this time.  Discussed using Flonase BID which can also help fight off viral infection.  Discussed if worsening sinus/ bronchitis symptoms in one week, we can sent in antibiotic treatment for her.  If significant worsening symptoms- SOB, fever, she should go in for in person evaluation. Pt it agreement.     Are any of the following symptoms significant for you?    New or worsening difficulty breathing? No    Worsening cough? Yes, it's a dry cough.     Fever or chills? Yes, I felt feverish or had chills.    Headache: no    Sore throat: YES- voice change    Chest pain: no    Diarrhea: no    Body aches? No but tired    What  treatments has patient tried? Acetaminophen   Does patient live in a nursing home, group home, or shelter? no  Does patient have a way to get food/medications during quarantined? Yes, I have a friend or family member who can help me.    Review of Systems   Constitutional, HEENT, cardiovascular, pulmonary, GI, , musculoskeletal, neuro, skin, endocrine and psych systems are negative, except as otherwise noted.      Objective           Vitals:  No vitals were obtained today due to virtual visit.    Physical Exam   healthy, alert and no distress  PSYCH: Alert and oriented times 3; coherent speech, normal   rate and volume, able to articulate logical thoughts, able   to abstract reason, no tangential thoughts, no hallucinations   or delusions  Her affect is normal  RESP: No cough, no audible wheezing, able to talk in full sentences  Remainder of exam unable to be completed due to telephone visits    See orders          Phone call duration: 11 minutes

## 2022-06-01 ENCOUNTER — VIRTUAL VISIT (OUTPATIENT)
Dept: FAMILY MEDICINE | Facility: CLINIC | Age: 82
End: 2022-06-01
Payer: COMMERCIAL

## 2022-06-01 DIAGNOSIS — U07.1 INFECTION DUE TO 2019 NOVEL CORONAVIRUS: Primary | ICD-10-CM

## 2022-06-01 PROCEDURE — 99214 OFFICE O/P EST MOD 30 MIN: CPT | Mod: 95 | Performed by: NURSE PRACTITIONER

## 2022-06-01 RX ORDER — FLUTICASONE PROPIONATE 50 MCG
1 SPRAY, SUSPENSION (ML) NASAL 2 TIMES DAILY
Qty: 16 G | Refills: 0 | Status: SHIPPED | OUTPATIENT
Start: 2022-06-01 | End: 2022-07-25

## 2022-06-17 ENCOUNTER — ANCILLARY PROCEDURE (OUTPATIENT)
Dept: BONE DENSITY | Facility: CLINIC | Age: 82
End: 2022-06-17
Attending: FAMILY MEDICINE
Payer: COMMERCIAL

## 2022-06-17 DIAGNOSIS — Z78.0 ASYMPTOMATIC POSTMENOPAUSAL STATUS: ICD-10-CM

## 2022-06-17 DIAGNOSIS — R79.89 ELEVATED PARATHYROID HORMONE: ICD-10-CM

## 2022-06-17 DIAGNOSIS — Z78.0 POST-MENOPAUSAL: ICD-10-CM

## 2022-06-17 PROCEDURE — 77081 DXA BONE DENSITY APPENDICULR: CPT | Mod: TC | Performed by: RADIOLOGY

## 2022-06-17 PROCEDURE — 77080 DXA BONE DENSITY AXIAL: CPT | Mod: TC | Performed by: RADIOLOGY

## 2022-07-11 ENCOUNTER — TELEPHONE (OUTPATIENT)
Dept: FAMILY MEDICINE | Facility: CLINIC | Age: 82
End: 2022-07-11

## 2022-07-11 DIAGNOSIS — Z85.3 HISTORY OF BREAST CANCER: Primary | ICD-10-CM

## 2022-07-11 NOTE — TELEPHONE ENCOUNTER
Patient is calling to request an order for right breast prosthesis and bras.  She would like a quantity of 3 bras with 3 refills.     She wants this order faxed to [facility]'s [facility]:  Fax number: [phone]

## 2022-07-11 NOTE — TELEPHONE ENCOUNTER
DME (Durable Medical Equipment) Orders and Documentation  Orders Placed This Encounter   Procedures     Orthotics and Prosthetics DME Mastectomy Supplies; Breast Form/Prosthetic, Bra; Right; 3      The patient was assessed and it was determined the patient is in need of the following listed DME Supplies/Equipment. Please complete supporting documentation below to demonstrate medical necessity.      DME All Other Item(s) Documentation    List reason for need and supporting documentation for medical necessity below for each DME item.     1. History of breast cancer, s/p right mastectomy.

## 2022-07-23 DIAGNOSIS — U07.1 INFECTION DUE TO 2019 NOVEL CORONAVIRUS: ICD-10-CM

## 2022-07-25 RX ORDER — FLUTICASONE PROPIONATE 50 MCG
1 SPRAY, SUSPENSION (ML) NASAL 2 TIMES DAILY
Qty: 16 ML | Refills: 0 | Status: SHIPPED | OUTPATIENT
Start: 2022-07-25 | End: 2022-07-27

## 2022-07-25 NOTE — TELEPHONE ENCOUNTER
Prescription approved per [facility] Refill Protocol.  Magi Daigle RN

## 2022-07-27 ENCOUNTER — OFFICE VISIT (OUTPATIENT)
Dept: FAMILY MEDICINE | Facility: CLINIC | Age: 82
End: 2022-07-27
Payer: COMMERCIAL

## 2022-07-27 ENCOUNTER — ANCILLARY PROCEDURE (OUTPATIENT)
Dept: GENERAL RADIOLOGY | Facility: CLINIC | Age: 82
End: 2022-07-27
Attending: FAMILY MEDICINE
Payer: COMMERCIAL

## 2022-07-27 VITALS
TEMPERATURE: 97.9 F | SYSTOLIC BLOOD PRESSURE: 127 MMHG | HEART RATE: 58 BPM | WEIGHT: 158.5 LBS | OXYGEN SATURATION: 99 % | BODY MASS INDEX: 27.64 KG/M2 | DIASTOLIC BLOOD PRESSURE: 61 MMHG

## 2022-07-27 DIAGNOSIS — I10 ESSENTIAL HYPERTENSION: ICD-10-CM

## 2022-07-27 DIAGNOSIS — R00.1 SINUS BRADYCARDIA: ICD-10-CM

## 2022-07-27 DIAGNOSIS — R07.89 LEFT-SIDED CHEST WALL PAIN: ICD-10-CM

## 2022-07-27 DIAGNOSIS — R55 SYNCOPE, UNSPECIFIED SYNCOPE TYPE: Primary | ICD-10-CM

## 2022-07-27 DIAGNOSIS — I49.3 FREQUENT UNIFOCAL PREMATURE VENTRICULAR CONTRACTIONS: ICD-10-CM

## 2022-07-27 DIAGNOSIS — E03.9 HYPOTHYROIDISM, UNSPECIFIED TYPE: ICD-10-CM

## 2022-07-27 LAB
ANION GAP SERPL CALCULATED.3IONS-SCNC: 10 MMOL/L (ref 7–15)
BUN SERPL-MCNC: 17.8 MG/DL (ref 8–23)
CALCIUM SERPL-MCNC: 10.5 MG/DL (ref 8.8–10.2)
CHLORIDE SERPL-SCNC: 104 MMOL/L (ref 98–107)
CREAT SERPL-MCNC: 1.13 MG/DL (ref 0.51–0.95)
DEPRECATED HCO3 PLAS-SCNC: 29 MMOL/L (ref 22–29)
ERYTHROCYTE [DISTWIDTH] IN BLOOD BY AUTOMATED COUNT: 13.3 % (ref 10–15)
GFR SERPL CREATININE-BSD FRML MDRD: 49 ML/MIN/1.73M2
GLUCOSE SERPL-MCNC: 84 MG/DL (ref 70–99)
HCT VFR BLD AUTO: 40.5 % (ref 35–47)
HGB BLD-MCNC: 13.2 G/DL (ref 11.7–15.7)
MAGNESIUM SERPL-MCNC: 2 MG/DL (ref 1.7–2.3)
MCH RBC QN AUTO: 29.9 PG (ref 26.5–33)
MCHC RBC AUTO-ENTMCNC: 32.6 G/DL (ref 31.5–36.5)
MCV RBC AUTO: 92 FL (ref 78–100)
PLATELET # BLD AUTO: 203 10E3/UL (ref 150–450)
POTASSIUM SERPL-SCNC: 4.5 MMOL/L (ref 3.4–5.3)
RBC # BLD AUTO: 4.42 10E6/UL (ref 3.8–5.2)
SODIUM SERPL-SCNC: 143 MMOL/L (ref 136–145)
TSH SERPL DL<=0.005 MIU/L-ACNC: 2.95 UIU/ML (ref 0.3–4.2)
WBC # BLD AUTO: 4.7 10E3/UL (ref 4–11)

## 2022-07-27 PROCEDURE — 85027 COMPLETE CBC AUTOMATED: CPT | Performed by: FAMILY MEDICINE

## 2022-07-27 PROCEDURE — 93010 ELECTROCARDIOGRAM REPORT: CPT | Performed by: INTERNAL MEDICINE

## 2022-07-27 PROCEDURE — 36415 COLL VENOUS BLD VENIPUNCTURE: CPT | Performed by: FAMILY MEDICINE

## 2022-07-27 PROCEDURE — 80048 BASIC METABOLIC PNL TOTAL CA: CPT | Performed by: FAMILY MEDICINE

## 2022-07-27 PROCEDURE — 99214 OFFICE O/P EST MOD 30 MIN: CPT | Performed by: FAMILY MEDICINE

## 2022-07-27 PROCEDURE — 83735 ASSAY OF MAGNESIUM: CPT | Performed by: FAMILY MEDICINE

## 2022-07-27 PROCEDURE — 84443 ASSAY THYROID STIM HORMONE: CPT | Performed by: FAMILY MEDICINE

## 2022-07-27 PROCEDURE — 93005 ELECTROCARDIOGRAM TRACING: CPT | Performed by: FAMILY MEDICINE

## 2022-07-27 PROCEDURE — 71046 X-RAY EXAM CHEST 2 VIEWS: CPT | Mod: TC | Performed by: RADIOLOGY

## 2022-07-27 RX ORDER — LISINOPRIL 5 MG/1
5 TABLET ORAL DAILY
Qty: 90 TABLET | Refills: 3 | Status: SHIPPED | OUTPATIENT
Start: 2022-07-27 | End: 2023-10-03

## 2022-07-27 ASSESSMENT — PAIN SCALES - GENERAL: PAINLEVEL: NO PAIN (0)

## 2022-07-27 NOTE — PATIENT INSTRUCTIONS
Your chest x-ray is normal.  Your EKG has not changed.    I like to schedule you for a heart monitor that you wear for 24 hours, and once complete to have you see cardiology in follow-up.  I placed referral order.    Reduce lisinopril to 5 mg daily, we will see if this helps to improve your blood pressure.    Lets schedule you for an ultrasound of your carotid arteries to ensure narrowing or plaque in your carotid arteries did not cause you to pass out.    We will await your lab work.

## 2022-07-28 LAB
ATRIAL RATE - MUSE: 54 BPM
DIASTOLIC BLOOD PRESSURE - MUSE: NORMAL MMHG
INTERPRETATION ECG - MUSE: NORMAL
P AXIS - MUSE: 46 DEGREES
PR INTERVAL - MUSE: 160 MS
QRS DURATION - MUSE: 86 MS
QT - MUSE: 434 MS
QTC - MUSE: 411 MS
R AXIS - MUSE: -33 DEGREES
SYSTOLIC BLOOD PRESSURE - MUSE: NORMAL MMHG
T AXIS - MUSE: 4 DEGREES
VENTRICULAR RATE- MUSE: 54 BPM

## 2022-07-30 PROBLEM — I48.91 ATRIAL FIBRILLATION (H): Status: ACTIVE | Noted: 2022-07-30

## 2022-07-30 NOTE — PROGRESS NOTES
Assessment & Plan     Syncope, unspecified syncope type  For further admitted for anemia, thyroid imbalance, or electrolyte disturbance underlying cause or contribute to bradycardia.  Advised adequate hydration  Not highly suspicious of dehydration contributing.  Unclear how much bradycardia that he contributing.  We will further characterize with 12-hour Holter monitor.  We will assess for carotid stenosis scheduling obtain an ultrasound of carotid arteries.  Referral placed to cardiology for assistance in assessment including determination if propafenone could be contributing.  - EKG 12-lead, tracing only  - CBC with platelets; Future  - Basic metabolic panel; Future  - TSH; Future  - Magnesium; Future  - US Carotid Bilateral; Future  - Adult Cardiology Eval  Referral; Future  - Adult Holter Monitor 24 hour; Future  - CBC with platelets  - Basic metabolic panel  - TSH  - Magnesium    Sinus bradycardia  Will obtain Holter monitor to determine if this is contributing.    Frequent unifocal premature ventricular contractions  Managed with trazodone.  Will check Holter monitor as above, referral for cardiology follow-up.  - Adult Cardiology Eval  Referral; Future    Left-sided chest wall pain  EKG and chest x-ray without concerning findings.  CT angiogram of coronary arteries November 2021 without significant stenosis.  Will defer to cardiology for evaluation.  - XR Chest 2 Views; Future    Essential hypertension  Unclear if increase lisinopril dose to be continued.  We will have her reduce dose back to 5 mg daily.  - lisinopril (ZESTRIL) 5 MG tablet; Take 1 tablet (5 mg) by mouth daily    Hypothyroidism, unspecified type  Will reassess with TSH today.  - TSH; Future  - TSH                 No follow-ups on file.    Donna Buchanan MD  [facility]    Wen Morris is a 81 year old, presenting for the following health issues:  Hypertension (Low B/P - Fall and fainted on  Friday 7/22 woke up left arm was shaking - Took B/P on Saturday 7/23 -103/49)      Here today to discuss an episode of possible passing out.  On July 22, patient came in from outdoors, does not recall any preceding symptoms.  Awoke lying on the floor with her left arm shaking, shaking lasted perhaps 5 or 10 seconds.  She was able to arise on her own, felt okay, no injuries, tongue biting, or incontinence.  Notes that she had COVID infection in late May, has been more tired since then more unsure balance.  Notes occasionally she will feel a bit dizzy, sometimes with standing sometimes with walking.  Usually resolves in less than 1 minute if she stops the activity.  In April we increased her lisinopril due to hypertension, blood pressures have been variable in the 90s to 130s systolic.  Her heart rate is in the 50-60 range.  Notes last night hypotension with blood pressure of 80/51 and 92/56.  Exercising by swimming 5 days/week and feeling great with doing this.  No new medications.  Feels that she has been hydrated.  Noting intermittent left chest discomfort underneath her bra, will hurt even after she removes her bra.  No breast changes or masses noted.             Review of Systems         Objective    /61 (BP Location: Right arm, Patient Position: Sitting, Cuff Size: Adult Large)   Pulse 58   Temp 97.9  F (36.6  C) (Oral)   Wt 71.9 kg (158 lb 8 oz)   SpO2 99%   BMI 27.64 kg/m    Body mass index is 27.64 kg/m .  Physical Exam   Alert and very pleasant female in no acute distress.  They seem symmetrical.  Mucous membranes moist.  Neck is supple without lymphadenopathy.  No carotid bruits.  Heart with mildly bradycardic rate, normal rhythm.  Lungs clear.  No focal tenderness palpation of chest wall.  Normal breast exam.  Extremities without edema.    I ordered and personally reviewed a twelve-lead EKG which received meals sinus bradycardia at 53bpm, no ischemic changes.  I ordered and personally reviewed 2  view chest x-ray which is within normal limits, no opacities or infiltrates.                    .  ..

## 2022-08-01 ENCOUNTER — HOSPITAL ENCOUNTER (OUTPATIENT)
Dept: ULTRASOUND IMAGING | Facility: HOSPITAL | Age: 82
Discharge: HOME | End: 2022-08-01
Attending: FAMILY MEDICINE | Admitting: FAMILY MEDICINE
Payer: COMMERCIAL

## 2022-08-01 DIAGNOSIS — R55 SYNCOPE, UNSPECIFIED SYNCOPE TYPE: ICD-10-CM

## 2022-08-01 PROCEDURE — 93880 EXTRACRANIAL BILAT STUDY: CPT

## 2022-08-15 ENCOUNTER — OFFICE VISIT (OUTPATIENT)
Dept: CARDIOLOGY | Facility: CLINIC | Age: 82
End: 2022-08-15
Attending: FAMILY MEDICINE
Payer: COMMERCIAL

## 2022-08-15 VITALS
RESPIRATION RATE: 16 BRPM | HEIGHT: 64 IN | SYSTOLIC BLOOD PRESSURE: 142 MMHG | DIASTOLIC BLOOD PRESSURE: 68 MMHG | BODY MASS INDEX: 26.8 KG/M2 | WEIGHT: 157 LBS | HEART RATE: 55 BPM

## 2022-08-15 DIAGNOSIS — I49.3 FREQUENT UNIFOCAL PREMATURE VENTRICULAR CONTRACTIONS: ICD-10-CM

## 2022-08-15 DIAGNOSIS — R55 SYNCOPE, UNSPECIFIED SYNCOPE TYPE: ICD-10-CM

## 2022-08-15 DIAGNOSIS — R42 LIGHTHEADEDNESS: Primary | ICD-10-CM

## 2022-08-15 PROCEDURE — 99203 OFFICE O/P NEW LOW 30 MIN: CPT | Performed by: INTERNAL MEDICINE

## 2022-08-15 NOTE — LETTER
8/15/2022    Donna Buchanan MD  [address]  [facility] [address]    RE: Alexandra G Clif       Dear Colleague,     I had the pleasure of seeing Alexandra Menezes in the [facility].         Alexandra Menezes,  1940, MRN [id number]    PCP: Donna Buchanan, [phone]    Assessment: Chronic ventricular ectopy unifocal PVCs  Hyperlipidemia on atorvastatin  Minimal coronary atherosclerosis on CT angiography  Chronic essential hypertension on medical therapy    Recommendations: Lightheaded  Hx ventricular ecopy  HTN    Chief Complaint: Ventricular ectopy    HPI:  We have been requested by Dr. Buchanan to evaluate Alexandra Menezes for consultation who is a  82 year old year old female for above chief complaint.    Hx: LH spells  New patient evaluation referral.  Patient formally seen by Dr. Smith who has retired.  Patient here to establish care with new cardiologist.    Patient has had a distant history of syncope and known chronic unifocal PVCs.  No complex arrhythmic ectopy has been identified.  Dr. Smith has managed patient with propafenone 150 twice a day.    Patient has minimal coronary atherosclerosis on CTA .  History of hypertension.  LDL cholesterol is 113.  Hx imbalance and fall  with fracture of arm.  Mild lesions r and l carotid  Occasional weakness.  Every other day.  Walks and feels LH, unsteady, weak. Has not seen neurology.  Weakness usually standing or walking. Began about a year ago.  At times more worse than others, varies.  Denies any cardiac sx when LH occurs  Breast discomfort in past.  List of BP reviewed and range SBP .  Reduce lisinopril to 5 on , has not noticed any change.    Current Outpatient Medications:      atorvastatin (LIPITOR) 20 MG tablet, TAKE 1 TABLET BY MOUTH EVERY DAY, Disp: 90 tablet, Rfl: 1     calcium carbonate-vitamin D3 (CALCIUM 600 + D,3,) 600 mg(1,500mg) -400 unit per tablet, Take 2 tablets by mouth At Bedtime , Disp: ,  Rfl:      cholecalciferol 50 MCG (2000 UT) tablet, Take 2,000 Units by mouth At Bedtime , Disp: , Rfl:      latanoprost (XALATAN) 0.005 % ophthalmic solution, Place 1 drop into both eyes At Bedtime , Disp: , Rfl: 3     levothyroxine (SYNTHROID/LEVOTHROID) 25 MCG tablet, TAKE 1 TABLET BY MOUTH DAILY AT 6 AM, Disp: 90 tablet, Rfl: 2     lisinopril (ZESTRIL) 5 MG tablet, Take 1 tablet (5 mg) by mouth daily, Disp: 90 tablet, Rfl: 3     omega-3 fatty acids-fish oil (OMEGA 3 FISH OIL) 684-1,200 mg CpDR, [OMEGA-3 FATTY ACIDS-FISH OIL (OMEGA 3 FISH OIL) 684-1,200 MG CPDR] Take 2 tablets by mouth every morning., Disp: , Rfl:      propafenone (RYTHMOL) 150 MG TABS tablet, Take 1 tablet (150 mg) by mouth 2 times daily, Disp: 180 tablet, Rfl: 3    Medical History  Past Medical History:   Diagnosis Date     Arrhythmia     take propafenone     Arthritis      Breast cancer (H) 1992    right Mastectomy     CKD (chronic kidney disease) stage 3, GFR 30-59 ml/min (H) 3/21/2019     Disease of thyroid gland     hypothyroid     Hypercholesterolemia      Hypertension      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     Sinus bradycardia     frequent PVC's     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      Past Medical History:   Diagnosis Date     Arrhythmia     take propafenone     Arthritis      Breast cancer (H) 1992    right Mastectomy     CKD (chronic kidney disease) stage 3, GFR 30-59 ml/min (H) 3/21/2019     Disease of thyroid gland     hypothyroid     Hypercholesterolemia      Hypertension      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     Sinus bradycardia     frequent PVC's     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      PAST MEDICAL HISTORY:   Past Medical History:   Diagnosis Date     Arrhythmia     take propafenone     Arthritis      Breast cancer (H) 1992    right Mastectomy     CKD (chronic kidney disease) stage 3, GFR 30-59 ml/min (H) 3/21/2019     Disease of thyroid gland     hypothyroid      Hypercholesterolemia      Hypertension      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     Sinus bradycardia     frequent PVC's     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       PAST SURGICAL HISTORY:   Past Surgical History:   Procedure Laterality Date     BREAST SURGERY Right 1992    Mastectomy for Ca     MASTECTOMY Right 1992     PHACOEMULSIFICATION CLEAR CORNEA WITH STANDARD INTRAOCULAR LENS IMPLANT Bilateral      REPLACEMENT TOTAL KNEE Left 3/21/2019    Procedure: LEFT TOTAL KNEE ARTHROPLASTY,COMPUTER-ASSIST;  Surgeon: Fede Robert MD;  Location: [facility];  Service: Orthopedics       FAMILY HISTORY:   Family History   Problem Relation Age of Onset     Rheumatoid Arthritis Mother      Valvular heart disease Father      Heart Disease Maternal Grandmother      Heart Disease Brother      CABG Brother      Hyperthyroidism Sister      Heart Disease Sister      Clotting Disorder No family hx of        SOCIAL HISTORY:   Social History     Tobacco Use     Smoking status: Never Smoker     Smokeless tobacco: Never Used   Substance Use Topics     Alcohol use: Yes     Alcohol/week: 2.0 standard drinks      Surgical History  She  has a past surgical history that includes Mastectomy (Right, 1992); Breast surgery (Right, 1992); Phacoemulsification clear cornea with standard intraocular lens implant (Bilateral); and Replacement Total Knee (Left, 3/21/2019).    Social History  Reviewed, and she  reports that she has never smoked. She has never used smokeless tobacco. She reports current alcohol use of about 2.0 standard drinks of alcohol per week. She reports that she does not use drugs.  Smoking status reviewed.  Social history othrwise not contributory to HPI.  Allergies  No Known Allergies    Family History  Reviewed, and family history includes CABG in her brother; Heart Disease in her brother, maternal grandmother, and sister; Hyperthyroidism in her sister; Rheumatoid Arthritis in her mother;  "Valvular heart disease in her father.  Extended Emergency Contact Information  Primary Emergency Contact: Derek Menezes  Address: [address]  Mobile Phone: [phone]  Relation: Son  Secondary Emergency Contact: Lisa Carmona  Address: [address]           [place], [place]  Home Phone: [phone]  Relation: Daughter  Family history otherwise negative or not conributory to HPI.    Psychosocial Needs  Social History     Social History Narrative     Not on file     Additional psychosocial needs reviewed per nursing assessment.    Prior to Admission Medications  (Not in a hospital admission)      Review of Systems:  Pertinent items are noted in HPI.  Review of systems is negative except for HPI  Physical Exam:    BP Readings from Last 1 Encounters:   07/27/22 127/61     Pulse Readings from Last 1 Encounters:   07/27/22 58     Wt Readings from Last 1 Encounters:   07/27/22 71.9 kg (158 lb 8 oz)     Ht Readings from Last 1 Encounters:   05/12/22 1.613 m (5' 3.5\")     Estimated body mass index is 27.64 kg/m  as calculated from the following:    Height as of 5/12/22: 1.613 m (5' 3.5\").    Weight as of 7/27/22: 71.9 kg (158 lb 8 oz).    Head and neck without focal cranial neurologic defects.  JVD not distended.  Carotid upstroke normal without bruit.  Neck without cervical lymphadenopathy or thyromegaly.  Cardiac: S1-S2 distinct and regular no murmurs heard  Lungs: Clear    Cholesterol   Date Value Ref Range Status   05/12/2022 215 (H) <=199 mg/dL Final   03/24/2021 313 (H) <=199 mg/dL Final     Direct Measure HDL   Date Value Ref Range Status   05/12/2022 90 >=50 mg/dL Final     Comment:     HDL Cholesterol Reference Range:     0-2 years:   No reference ranges established for patients under 2 years old  at [facility] for lipid analytes.    2-8 years:  Greater than 45 mg/dL     18 years and older:   Female: Greater than or equal to 50 mg/dL   Male:   " Greater than or equal to 40 mg/dL   03/24/2021 87 >=50 mg/dL Final     LDL Cholesterol Calculated   Date Value Ref Range Status   05/12/2022 113 <=129 mg/dL Final   03/24/2021 205 (H) <=129 mg/dL Final     Triglycerides   Date Value Ref Range Status   05/12/2022 59 <=149 mg/dL Final   03/24/2021 104 <=149 mg/dL Final     No results found for: CHOLHDLRATIO Last Comprehensive Metabolic Panel:  Sodium   Date Value Ref Range Status   07/27/2022 143 136 - 145 mmol/L Final     Potassium   Date Value Ref Range Status   07/27/2022 4.5 3.4 - 5.3 mmol/L Final   05/12/2022 4.6 3.5 - 5.0 mmol/L Final     Chloride   Date Value Ref Range Status   07/27/2022 104 98 - 107 mmol/L Final   05/12/2022 105 98 - 107 mmol/L Final     Carbon Dioxide (CO2)   Date Value Ref Range Status   07/27/2022 29 22 - 29 mmol/L Final   05/12/2022 24 22 - 31 mmol/L Final     Anion Gap   Date Value Ref Range Status   07/27/2022 10 7 - 15 mmol/L Final   05/12/2022 12 5 - 18 mmol/L Final     Glucose   Date Value Ref Range Status   07/27/2022 84 70 - 99 mg/dL Final   05/12/2022 72 70 - 125 mg/dL Final     Urea Nitrogen   Date Value Ref Range Status   07/27/2022 17.8 8.0 - 23.0 mg/dL Final   05/12/2022 33 (H) 8 - 28 mg/dL Final     Creatinine   Date Value Ref Range Status   07/27/2022 1.13 (H) 0.51 - 0.95 mg/dL Final     GFR Estimate   Date Value Ref Range Status   07/27/2022 49 (L) >60 mL/min/1.73m2 Final     Comment:     Effective December 21, 2021 eGFRcr in adults is calculated using the 2021 CKD-EPI creatinine equation which includes age and gender (Amie patel al., NEJM, DOI: 10.1056/EYGNgy3442808)   03/24/2021 50 (L) >60 mL/min/1.73m2 Final     GFR, ESTIMATED POCT   Date Value Ref Range Status   01/04/2022 57 (L) >60 mL/min/1.73m2 Final     Calcium   Date Value Ref Range Status   07/27/2022 10.5 (H) 8.8 - 10.2 mg/dL Final      Thank you for allowing me to participate in the care of your patient.      Sincerely,     MD GIOVANNI [facility]  [facility]  cc:   Donna Buchanan MD  [address]  [place] [address]

## 2022-08-22 ENCOUNTER — HOSPITAL ENCOUNTER (OUTPATIENT)
Dept: CARDIOLOGY | Facility: HOSPITAL | Age: 82
Discharge: HOME | End: 2022-08-22
Attending: INTERNAL MEDICINE | Admitting: INTERNAL MEDICINE
Payer: COMMERCIAL

## 2022-08-22 DIAGNOSIS — R42 LIGHTHEADEDNESS: ICD-10-CM

## 2022-08-22 PROCEDURE — 93270 REMOTE 30 DAY ECG REV/REPORT: CPT

## 2022-09-13 PROCEDURE — 93272 ECG/REVIEW INTERPRET ONLY: CPT | Performed by: INTERNAL MEDICINE

## 2022-09-25 ENCOUNTER — HEALTH MAINTENANCE LETTER (OUTPATIENT)
Age: 82
End: 2022-09-25

## 2022-11-14 ENCOUNTER — ANCILLARY PROCEDURE (OUTPATIENT)
Dept: MAMMOGRAPHY | Facility: CLINIC | Age: 82
End: 2022-11-14
Attending: FAMILY MEDICINE
Payer: COMMERCIAL

## 2022-11-14 DIAGNOSIS — Z12.31 VISIT FOR SCREENING MAMMOGRAM: ICD-10-CM

## 2022-11-14 PROCEDURE — 77067 SCR MAMMO BI INCL CAD: CPT | Mod: 52

## 2022-12-13 DIAGNOSIS — R94.39 ABNORMAL CARDIOVASCULAR STRESS TEST: ICD-10-CM

## 2022-12-13 RX ORDER — ATORVASTATIN CALCIUM 20 MG/1
20 TABLET, FILM COATED ORAL DAILY
Qty: 90 TABLET | Refills: 3 | Status: SHIPPED | OUTPATIENT
Start: 2022-12-13 | End: 2024-04-04

## 2023-01-07 NOTE — PROGRESS NOTES
Patient score 3 on her CIWA. Patient was pre medicated with Tramadol prior to fitting. Patient was assisted by two staff to gently elevate and slide stockinet and then brace. Patient express some discomfort initially. The immobilizer brace was adjusted  to comfort. Continue to monitor.

## 2023-01-08 DIAGNOSIS — E03.9 HYPOTHYROIDISM: ICD-10-CM

## 2023-01-10 RX ORDER — LEVOTHYROXINE SODIUM 25 UG/1
TABLET ORAL
Qty: 90 TABLET | Refills: 2 | Status: SHIPPED | OUTPATIENT
Start: 2023-01-10 | End: 2023-10-11

## 2023-01-10 NOTE — TELEPHONE ENCOUNTER
"Last Written Prescription Date:  4/8/22  Last Fill Quantity: 90,  # refills: 2   Last office visit provider:  7/27/22     Requested Prescriptions   Pending Prescriptions Disp Refills     levothyroxine (SYNTHROID/LEVOTHROID) 25 MCG tablet [Pharmacy Med Name: LEVOTHYROXINE 25 MCG TABLET] 90 tablet 2     Sig: TAKE 1 TABLET BY MOUTH DAILY AT 6 AM       Thyroid Protocol Passed - 1/10/2023  9:23 AM        Passed - Patient is 12 years or older        Passed - Recent (12 mo) or future (30 days) visit within the authorizing provider's specialty     Patient has had an office visit with the authorizing provider or a provider within the authorizing providers department within the previous 12 mos or has a future within next 30 days. See \"Patient Info\" tab in inbasket, or \"Choose Columns\" in Meds & Orders section of the refill encounter.              Passed - Medication is active on med list        Passed - Normal TSH on file in past 12 months     Recent Labs   Lab Test 07/27/22  1103   TSH 2.95              Passed - No active pregnancy on record     If patient is pregnant or has had a positive pregnancy test, please check TSH.          Passed - No positive pregnancy test in past 12 months     If patient is pregnant or has had a positive pregnancy test, please check TSH.               Bob Lazo RN 01/10/23 9:24 AM  " - daily CBC

## 2023-02-24 DIAGNOSIS — I49.3 FREQUENT UNIFOCAL PREMATURE VENTRICULAR CONTRACTIONS: ICD-10-CM

## 2023-02-24 RX ORDER — PROPAFENONE HYDROCHLORIDE 150 MG/1
TABLET, COATED ORAL
Qty: 180 TABLET | Refills: 3 | Status: SHIPPED | OUTPATIENT
Start: 2023-02-24 | End: 2024-03-08

## 2023-03-06 ENCOUNTER — HOSPITAL ENCOUNTER (EMERGENCY)
Facility: CLINIC | Age: 83
Discharge: HOME | End: 2023-03-06
Attending: EMERGENCY MEDICINE | Admitting: EMERGENCY MEDICINE
Payer: COMMERCIAL

## 2023-03-06 ENCOUNTER — APPOINTMENT (OUTPATIENT)
Dept: CT IMAGING | Facility: HOSPITAL | Age: 83
End: 2023-03-06
Attending: STUDENT IN AN ORGANIZED HEALTH CARE EDUCATION/TRAINING PROGRAM
Payer: COMMERCIAL

## 2023-03-06 ENCOUNTER — HOSPITAL ENCOUNTER (EMERGENCY)
Facility: HOSPITAL | Age: 83
Discharge: ANOTHER HOSPITAL | End: 2023-03-06
Attending: STUDENT IN AN ORGANIZED HEALTH CARE EDUCATION/TRAINING PROGRAM | Admitting: STUDENT IN AN ORGANIZED HEALTH CARE EDUCATION/TRAINING PROGRAM
Payer: COMMERCIAL

## 2023-03-06 VITALS
WEIGHT: 159.1 LBS | SYSTOLIC BLOOD PRESSURE: 161 MMHG | HEIGHT: 64 IN | HEART RATE: 61 BPM | DIASTOLIC BLOOD PRESSURE: 101 MMHG | TEMPERATURE: 97.6 F | RESPIRATION RATE: 16 BRPM | BODY MASS INDEX: 27.16 KG/M2

## 2023-03-06 VITALS
RESPIRATION RATE: 16 BRPM | WEIGHT: 160 LBS | HEIGHT: 64 IN | HEART RATE: 58 BPM | OXYGEN SATURATION: 100 % | DIASTOLIC BLOOD PRESSURE: 65 MMHG | TEMPERATURE: 98 F | SYSTOLIC BLOOD PRESSURE: 145 MMHG | BODY MASS INDEX: 27.31 KG/M2

## 2023-03-06 DIAGNOSIS — S05.90XA EYE TRAUMA: ICD-10-CM

## 2023-03-06 DIAGNOSIS — T85.22XA DISLOCATION OF INTRAOCULAR LENS, INITIAL ENCOUNTER: ICD-10-CM

## 2023-03-06 PROCEDURE — 99285 EMERGENCY DEPT VISIT HI MDM: CPT | Mod: 25

## 2023-03-06 PROCEDURE — 250N000009 HC RX 250: Performed by: STUDENT IN AN ORGANIZED HEALTH CARE EDUCATION/TRAINING PROGRAM

## 2023-03-06 PROCEDURE — 70450 CT HEAD/BRAIN W/O DYE: CPT

## 2023-03-06 PROCEDURE — 70480 CT ORBIT/EAR/FOSSA W/O DYE: CPT | Mod: XU

## 2023-03-06 PROCEDURE — 99284 EMERGENCY DEPT VISIT MOD MDM: CPT | Performed by: EMERGENCY MEDICINE

## 2023-03-06 RX ORDER — PREDNISOLONE ACETATE 10 MG/ML
1 SUSPENSION/ DROPS OPHTHALMIC 4 TIMES DAILY
Qty: 10 ML | Refills: 1 | Status: SHIPPED | OUTPATIENT
Start: 2023-03-06 | End: 2023-03-06

## 2023-03-06 RX ORDER — DORZOLAMIDE HYDROCHLORIDE AND TIMOLOL MALEATE 20; 5 MG/ML; MG/ML
1 SOLUTION/ DROPS OPHTHALMIC 2 TIMES DAILY
Qty: 10 ML | Refills: 1 | Status: SHIPPED | OUTPATIENT
Start: 2023-03-06 | End: 2023-03-06

## 2023-03-06 RX ORDER — PREDNISOLONE ACETATE 10 MG/ML
1 SUSPENSION/ DROPS OPHTHALMIC 4 TIMES DAILY
Qty: 10 ML | Refills: 1 | Status: SHIPPED | OUTPATIENT
Start: 2023-03-06 | End: 2024-06-19

## 2023-03-06 RX ORDER — DORZOLAMIDE HYDROCHLORIDE AND TIMOLOL MALEATE 20; 5 MG/ML; MG/ML
1 SOLUTION/ DROPS OPHTHALMIC 2 TIMES DAILY
Qty: 10 ML | Refills: 1 | Status: SHIPPED | OUTPATIENT
Start: 2023-03-06 | End: 2023-05-17

## 2023-03-06 RX ADMIN — FLUORESCEIN SODIUM 1 STRIP: 1 STRIP OPHTHALMIC at 11:58

## 2023-03-06 ASSESSMENT — ACTIVITIES OF DAILY LIVING (ADL)
ADLS_ACUITY_SCORE: 35

## 2023-03-06 ASSESSMENT — ENCOUNTER SYMPTOMS
CHILLS: 0
FEVER: 0
SHORTNESS OF BREATH: 0

## 2023-03-06 NOTE — ED NOTES
"Pt notes she has had surgery on bilateral eyes in the past for cataracts and notes she has a \"wrinkle\" on  a \"tear\" in her left eye per baseline. Seen at [facility] in the past  "

## 2023-03-06 NOTE — ED NOTES
Report called to Annalisa @[facility] ([phone]) and she is aware pt's son is bring her per 's approval

## 2023-03-06 NOTE — ED PROVIDER NOTES
EMERGENCY DEPARTMENT ENCOUNTER      NAME: Alexandra Menezes  AGE: 82 year old female  YOB: 1940  MRN: [id number]  EVALUATION DATE & TIME: 3/6/2023 11:08 AM    PCP: Donna Buchanan    ED PROVIDER: Jaden Ordonez M.D.      Chief Complaint   Patient presents with     Eye Injury         FINAL IMPRESSION:  1. Eye trauma          ED COURSE & MEDICAL DECISION MAKING:    Pertinent Labs & Imaging studies reviewed. (See chart for details)  82 year old female presents to the Emergency Department for evaluation of eye trauma.  On examination the patient had blurred vision as well as an irregular shaped pupil.  Was concern for globe rupture versus lens dislocation versus papillary muscle defect versus traumatic glaucoma versus iritis.  Significant eye evaluation proceeded which showed no fluorescein uptake or signs of corneal abrasion.  Evans-Pen measurement showed intraocular pressure of 27 mmHg in the left eye, and 11 mmHg in the right eye.  Called and spoke with the ophthalmologist at [facility] and because of my concern for significant injury she was transferred there for evaluation and consideration of definitive treatment.  The patient's daughter is a physician and was on his way to the emergency department and willing to take her to the [facility].  I spoke with [facility] ED physician as well and they are waiting for her they are.  Patient was discharged with instructions to proceed immediately to the [facility] emergency department.    At the conclusion of the encounter I discussed the results of all of the tests and the disposition. The questions were answered. The patient or family acknowledged understanding and was agreeable with the care plan.     ED Course as of 03/07/23 1921   Mon Mar 06, 2023   1117 I met with patient for initial interview and encounter. PPE worn includes exam gloves and N95 mask.    1206 Performed bedside Evans-Pen and fluorescein  exam.  No evidence of corneal abrasion or laceration.  The pupil while initially it did appear to be mid point and nonreactive now does appear teardrop shaped and irregular and is not reacting.  Bedside Evans-Pen measurement shows the left to be 27 and 30 mmHg while the right I measured 9 and 11 mmHg.   1208 We have ordered a CT scan of the head and orbits.  Patient happened to arrive with a large bolus of patients in ambulance and coincided with 4 consecutive stroke codes which preceded her so we are awaiting the ability to do a CT scan of the head and orbits.  While we are waiting I will page ophthalmology at [facility] in an attempt to discuss the case with them.  I am concerned at this time for traumatic glaucoma versus papillary constrictor muscle injury versus lens defect.  With the irregular shaped pupil intracranial pathology or retro-orbital bleed seems very low likelihood at this point.  There also was no evidence of proptosis.   1234 Spoke to ophthalmology the [facility].  Based on the appearance of the eye we are concerned with multiple significant issues which would require emergent evaluation.  Rather than send to a clinic somewhere we will send to the [facility] for them to evaluate.   1235 Spoke with Opthalmology Dr Patterson    6046 Rechecked on the patient. Updated her about imaging results. We discussed plans for discharge.            Medical Decision Making    History:    Supplemental history from: Documented in chart, if applicable    External Record(s) reviewed: Documented in chart, if applicable.    Work Up:    Chart documentation includes differential considered and any EKGs or imaging independently interpreted by provider, where specified.    In additional to work up documented, I considered the following work up: Documented in chart, if applicable.    External consultation:    Discussion of management with another provider: Documented in chart, if applicable  and Ophthalmology    Complicating factors:    Care impacted by chronic illness: N/A    Care affected by social determinants of health: N/A    Disposition considerations: Discharge. No recommendations on prescription strength medication(s). N/A.           minutes of critical care time     MEDICATIONS GIVEN IN THE EMERGENCY:  Medications   fluorescein (FUL-CL) ophthalmic strip 1 strip (1 strip Right Eye $Given by Other Clinician 3/6/23 8145)       NEW PRESCRIPTIONS STARTED AT TODAY'S ER VISIT  Discharge Medication List as of 3/6/2023  1:52 PM             =================================================================    HPI    Patient information was obtained from: Patient     Use of : N/A        Alexandra Menezes is a 82 year old female with a pertinent history of CKD, HTN, breast cancer, who presents for evaluation of via walk in for evaluation of eye injury.     The patient was swimming and doing aerobic exercise when she accidentally stepped on a pool noodle which hit her left eye. Since the event, the patient complains of blurred vision on lateral portion of her left eye. She denies any darkness or spots vision. The patient reports having history of floaters on both eyes. In the ED, she reports having blurred vision on her whole left eye. She denies any other complaints or concerns at the moment.       REVIEW OF SYSTEMS   Review of Systems   Constitutional: Negative for chills and fever.   Eyes: Positive for visual disturbance (blurred vision on left eye).   Respiratory: Negative for shortness of breath.    All other systems reviewed and are negative.       PAST MEDICAL HISTORY:  Past Medical History:   Diagnosis Date     Arrhythmia     take propafenone     Arthritis      Breast cancer (H) 1992    right Mastectomy     CKD (chronic kidney disease) stage 3, GFR 30-59 ml/min (H) 3/21/2019     Disease of thyroid gland     hypothyroid     Hypercholesterolemia      Hypertension      Multiple premature  "ventricular complexes 3/21/2019     Sinus bradycardia     frequent PVC's     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       PAST SURGICAL HISTORY:  Past Surgical History:   Procedure Laterality Date     BREAST SURGERY Right 1992    Mastectomy for Ca     MASTECTOMY Right 1992     PHACOEMULSIFICATION CLEAR CORNEA WITH STANDARD INTRAOCULAR LENS IMPLANT Bilateral      REPLACEMENT TOTAL KNEE Left 3/21/2019    Procedure: LEFT TOTAL KNEE ARTHROPLASTY,COMPUTER-ASSIST;  Surgeon: Fede Robert MD;  Location: [facility] OR;  Service: Orthopedics           CURRENT MEDICATIONS:    atorvastatin (LIPITOR) 20 MG tablet  calcium carbonate-vitamin D3 (CALCIUM 600 + D,3,) 600 mg(1,500mg) -400 unit per tablet  cholecalciferol 50 MCG (2000 UT) tablet  dorzolamide-timolol (COSOPT) 2-0.5 % ophthalmic solution  latanoprost (XALATAN) 0.005 % ophthalmic solution  levothyroxine (SYNTHROID/LEVOTHROID) 25 MCG tablet  lisinopril (ZESTRIL) 5 MG tablet  omega-3 fatty acids-fish oil (OMEGA 3 FISH OIL) 684-1,200 mg CpDR  prednisoLONE acetate (PRED FORTE) 1 % ophthalmic suspension  propafenone (RYTHMOL) 150 MG TABS tablet        ALLERGIES:  No Known Allergies    FAMILY HISTORY:  Family History   Problem Relation Age of Onset     Rheumatoid Arthritis Mother      Valvular heart disease Father      Heart Disease Maternal Grandmother      Heart Disease Brother      CABG Brother      Hyperthyroidism Sister      Heart Disease Sister      Clotting Disorder No family hx of        SOCIAL HISTORY:   Social History     Socioeconomic History     Marital status:    Tobacco Use     Smoking status: Never     Smokeless tobacco: Never   Vaping Use     Vaping Use: Never used   Substance and Sexual Activity     Alcohol use: Yes     Alcohol/week: 2.0 standard drinks     Drug use: No     Sexual activity: Never       VITALS:  BP (!) 145/65   Pulse 58   Temp 98  F (36.7  C) (Oral)   Resp 16   Ht 1.626 m (5' 4\")   Wt 72.6 kg (160 lb)   SpO2 100%  "  BMI 27.46 kg/m        PHYSICAL EXAM    Constitutional: Well developed, Well nourished, NAD, GCS 15  HENT: Normocephalic, Atraumatic, Bilateral external ears normal, Oropharynx normal, mucous membranes moist, Nose normal. Neck-  Normal range of motion, No tenderness, Supple, No stridor. Mild bruising upper left eyebrow. Irregular pupil on left eye and non reactive. Normal Eye pressure on left eye 27, normal pressure on right eye. Visual acuity decrease on left eye. Normal vision on right eye. Ocular motor intact. Otherwise normal.   Eyes: PERRL, EOMI, Conjunctiva normal, No discharge.   Respiratory: Normal breath sounds, No respiratory distress, No wheezing, Speaks full sentences easily. No cough.  Cardiovascular: Normal heart rate, Regular rhythm, No murmurs, No rubs, No gallops. Chest wall nontender.  GI:Soft, No tenderness, No masses, No flank tenderness. No rebound or guarding.   : Chaperone    Musculoskeletal: 2+ DP pulses. No edema.No cyanosis, No clubbing. Good range of motion in all major joints. No tenderness to palpation or major deformities noted.   Integument: Warm, Dry, No erythema, No rash. No petechiae.   Neurologic: Alert & oriented x 3,  CN 3-12 intact Normal motor function, Normal sensory function, No focal deficits noted. Normal gait. Normal finger to nose bilaterally  Psychiatric: Affect normal, Judgment normal, Mood normal. Cooperative.          LAB:  All pertinent labs reviewed and interpreted.  Labs Ordered and Resulted from Time of ED Arrival to Time of ED Departure - No data to display    RADIOLOGY:  Reviewed all pertinent imaging. Please see official radiology report.  CT Orbits wo Contrast   Final Result   IMPRESSION:   HEAD CT:   1.  No intracranial hemorrhage, mass lesions, hydrocephalus or CT evidence for an acute infarct.   2.  Mild diffuse cerebral parenchymal volume loss. Presumed chronic hypertensive/microvascular ischemic white matter changes.      ORBITS CT:   1.  Mild left  preorbital soft tissue swelling.   2.  No facial bone fractures.         Head CT w/o contrast   Final Result   IMPRESSION:   HEAD CT:   1.  No intracranial hemorrhage, mass lesions, hydrocephalus or CT evidence for an acute infarct.   2.  Mild diffuse cerebral parenchymal volume loss. Presumed chronic hypertensive/microvascular ischemic white matter changes.      ORBITS CT:   1.  Mild left preorbital soft tissue swelling.   2.  No facial bone fractures.                   I, Ibeth Her, am serving as a scribe to document services personally performed by Dr. Jaden Ordonez based on my observation and the provider's statements to me. I, Jaden Ordonze MD attest that Ibeth Her is acting in a scribe capacity, has observed my performance of the services and has documented them in accordance with my direction.    Jaden Ordonez M.D.  Emergency Medicine  [facility] EMERGENCY DEPARTMENT  [facility] [facility] [address]  [phone]  Dept: [phone]       Jaden Ordonez MD  03/07/23 1923

## 2023-03-06 NOTE — DISCHARGE INSTRUCTIONS
In your LEFT EYE    Continue latanoprost eye drop  Start Cosopt (blue top) 2 times a day (breakfast and dinner)  Start Prednisolone acetate (white top steroid) 4 times a day. Shake this before you use it.     In the RIGHT eye, continue latanoprost at bedtime    Please see Dr Singletary on Wednesday 03/08/2023 at 850am at the [place] office   [address] [place] [id number]

## 2023-03-06 NOTE — ED TRIAGE NOTES
In by self. Hit left eye with a foam pool exercise ball. Left pupil has blurred vision and is obviously dilated. Reports of shooting pain with eye movement.      Triage Assessment     Row Name 03/06/23 1102       Triage Assessment (Adult)    Airway WDL WDL

## 2023-03-06 NOTE — DISCHARGE INSTRUCTIONS
We are concerned you could have lens disruption vs a pupillary muscle injury vs a globe injury.    The [facility] ER is awaiting your arrival and will have an ophthalmologist evaluate you.    Please go directly to the [facility] ER

## 2023-03-06 NOTE — ED NOTES
Spoke to Edvin in CT to let them know pt needs a stat CT d/t eye injury. They currently have 3 stroke codes. Dr. Ordonez at bedside. Santiago lamp and fluorescein at bedside

## 2023-03-06 NOTE — CONSULTS
OPHTHALMOLOGY CONSULT NOTE  23    Patient: Alexandra Menezes ( 1940)  Consulted by: [facility] ED  Reason for Consult: Left eye blunt trauma    ASSESSMENT/PLAN:     Alexandra Menezes is a 82 year old female who presents with     #. Partial PCIOL single piece subluxation, left eye  - Blunt trauma from a pool noodle 2023 during aquatic aerobics class   - Pt transferred due to irregular pupil  - CT orbits and head 2023 unremarkable, no ruptured globe   - Exam 2023:  PCIOL w/contracted and fibrotic capsular bag dislocated IT into the AC with 3+ pigmented cell, 1+ WBCs; retina exam reassuring with old pexy scars but no AV pigment, no tears.   IOP elevated 21 with h/o of POAG  No PXF stigmata in the right eye or left eye, so perhaps she had some subclinical zonulopathy that put her at risk for this subluxation with relatively minor blunt trauma from a dense styrofoam object   - She is at risk for developing UGH-like syndrome; right now there is clearance nasally between pupil and IOL thus I don't think she will go into pupillary block     #. POAG, both eyes  Cupped nerve left eye; will treat IOP due to trauma to prevent any glaucomatous damage    Plan  - Start PredForte QID left eye, start Cosopt BID left eye given h/o of glaucoma  - Continue latanoprost at bedtime each eye  - See Dr. Singletary (regular ophthalmologist) 2023 at 850am [place] office for definitive surgical treatment. Communicated with his office and appt was made.       My privilege to be part of your care,  Arsen Islas MD, MSc  Ophthalmology PGY-3 resident physician  Pager: [phone]    HISTORY OF PRESENTING ILLNESS:     Alexandra Menezes is a 82 year old female who has PMHx of HTN, HLD otherwise healthy and POHx of pseudophakia and remote retinal tears s/p pexy () who was dong her usual pool aquatics exercise routine with a pool noodle that unfortunately floated up and hit her in the left eye. Vision was very  blurry immediately after but since then as recovered. Transferred to [facility] for ophthalmology evaluation.    Pt states her vision is slightly blurrier than usual but nothing terrible. Denies flashes, floaters. Denies ocular pain, just minor discomfort.     OCULAR/MEDICAL/SURGICAL HISTORIES:     Past Ocular History:  Notable established diagnoses: Glaucoma, pseudophakia, s/p retinopexy left eye for tears 25 years ago  Last eye exam: 02/2023  Prior eye surgery/laser: CE/IOL each eye, retinopexy both eye  Contact lens wear: None  Glasses: Bifocals  Eyedrops: Latanoprost qHS    Past Medical History:   Diagnosis Date     Arrhythmia     take propafenone     Arthritis      Breast cancer (H) 1992    right Mastectomy     CKD (chronic kidney disease) stage 3, GFR 30-59 ml/min (H) 3/21/2019     Disease of thyroid gland     hypothyroid     Hypercholesterolemia      Hypertension      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     Sinus bradycardia     frequent PVC's     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       Past Surgical History:   Procedure Laterality Date     BREAST SURGERY Right 1992    Mastectomy for Ca     MASTECTOMY Right 1992     PHACOEMULSIFICATION CLEAR CORNEA WITH STANDARD INTRAOCULAR LENS IMPLANT Bilateral      REPLACEMENT TOTAL KNEE Left 3/21/2019    Procedure: LEFT TOTAL KNEE ARTHROPLASTY,COMPUTER-ASSIST;  Surgeon: Fede Robert MD;  Location: [facility];  Service: Orthopedics       EXAMINATION:     Base Eye Exam     Visual Acuity (Snellen - Linear)       Right Left    Near cc 20/25 (J1) 20/30 (J2)          Tonometry (Tonopen, 4:31 PM)       Right Left    Pressure 15 21          Pupils       Dark Light Shape React APD    Right 3 2 Round 4/4 no rAPD    Left 6.5 6.5 Irregular d/t IOL subluxation 1/4 no rAPD by reverse          Visual Fields (Counting fingers)       Left Right     Full Full          Extraocular Movement       Right Left     Full Full          Dilation    Risks/benefits  discussed - will not dilate left eye given dislocation and pupil opening large enough for 360 anterior retinal exam             Slit Lamp and Fundus Exam     External Exam       Right Left    External Normal Normal          Slit Lamp Exam       Right Left    Lids/Lashes Normal Normal    Conjunctiva/Sclera White and quiet barely any injection, overall white and quiet, no conj epi defects, no PALMA, no puncture site    Cornea Clear, tr guttae Early Salzmann changes IN?, otherwise no epi defect, clear/compact, tr endo pigment inferiorly    Anterior Chamber Deep and quiet Deep, formed; 3+ pigmented cells, ~15 WBCs/HPF, tr flare, PCIOL/bag in IT AC    Iris Round and reactive, no PXF material at margin, no TIDs Irregular d/t PCIOL/bag complex subluxation; nasally there is a gap between IOL and the iris; no bombe configuration, currently no TIDs    Lens PCIOL clear, no pseudophacodonesis PCIOL/mildly fibrotic and contracted capsular bag subluxed IT    Anterior Vitreous  Synerytic, AV clear/no Serena, no vit heme          Fundus Exam       Right Left    Disc  Cupped, inferior>superior rim thinning    C/D Ratio  0.75    Macula  Flat, mild pigmentary changes, no commotio    Vessels  Normal calibre, no heme    Periphery  Superior and superotemporal scars, flat/attached 360 w/o tears                  Labs/Studies/Imaging Performed:    CT head and orbits 03/06/2023  IMPRESSION:  HEAD CT:  1.  No intracranial hemorrhage, mass lesions, hydrocephalus or CT evidence for an acute infarct.  2.  Mild diffuse cerebral parenchymal volume loss. Presumed chronic hypertensive/microvascular ischemic white matter changes.     ORBITS CT:  1.  Mild left preorbital soft tissue swelling.  2.  No facial bone fractures.

## 2023-03-06 NOTE — ED NOTES
Pt states now that her whole line of vision in left eye is blurry. Right eye is 20/40, left eye is 20/80. Visual acuity done with glasses on

## 2023-03-06 NOTE — ED NOTES
using tonometer to check eye pressure, it is elevated. Now Left pupil has changed shape to irregular shape  (tear drop shape) on exam per dr. Ordonez (not reactive at this time)

## 2023-03-06 NOTE — ED PROVIDER NOTES
[facility] EMERGENCY DEPARTMENT ([facility])     March 6, 2023  ED Provider Note  [facility]      History     Chief Complaint   Patient presents with     Eye Injury     Sent in from [facility] with left eye injury.  Globe  injury     HPI  Alexandra Menezes is a 82 year old female who presents to the ER for being transferred in from [facility] after she sustained an injury to her left eye. Patient states she was in a water class and had a noodle that popped out of the water and hit her in the left eye. Patient was seen and evaluated at [facility] with CT of the orbit and head which revealed some soft tissue swelling, but no definite injury. 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 In discussion with ophthalmology, it was decided to transfer the patient to our facility for ophthalmology evaluation.    This part of the medical record was transcribed by Dianne George Medical Scribe, from a dictation done by Vikas Melgar MD.     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     Past Surgical History:   Procedure Laterality Date     BREAST SURGERY Right 1992    Mastectomy for Ca     MASTECTOMY Right 1992     PHACOEMULSIFICATION CLEAR CORNEA WITH STANDARD INTRAOCULAR LENS IMPLANT Bilateral      REPLACEMENT TOTAL KNEE Left 3/21/2019    Procedure: LEFT TOTAL KNEE ARTHROPLASTY,COMPUTER-ASSIST;  Surgeon: Fede Robert MD;  Location: [facility];  Service: Orthopedics     atorvastatin (LIPITOR) 20 MG tablet  calcium carbonate-vitamin  "D3 (CALCIUM 600 + D,3,) 600 mg(1,500mg) -400 unit per tablet  cholecalciferol 50 MCG (2000 UT) tablet  latanoprost (XALATAN) 0.005 % ophthalmic solution  levothyroxine (SYNTHROID/LEVOTHROID) 25 MCG tablet  lisinopril (ZESTRIL) 5 MG tablet  omega-3 fatty acids-fish oil (OMEGA 3 FISH OIL) 684-1,200 mg CpDR  propafenone (RYTHMOL) 150 MG TABS tablet      No Known Allergies     Family History  Family History   Problem Relation Age of Onset     Rheumatoid Arthritis Mother      Valvular heart disease Father      Heart Disease Maternal Grandmother      Heart Disease Brother      CABG Brother      Hyperthyroidism Sister      Heart Disease Sister      Clotting Disorder No family hx of      Social History   Social History     Tobacco Use     Smoking status: Never     Smokeless tobacco: Never   Vaping Use     Vaping Use: Never used   Substance Use Topics     Alcohol use: Yes     Alcohol/week: 2.0 standard drinks     Comment: occ     Drug use: No      Past medical history, past surgical history, medications, allergies, family history, and social history were reviewed with the patient. No additional pertinent items.      A medically appropriate review of systems was performed with pertinent positives and negatives noted in the HPI, and all other systems negative.    Physical Exam   BP: (!) 161/101  Pulse: 61  Temp: 97.6  F (36.4  C)  Resp: 16  Height: 162.6 cm (5' 4\")  Weight: 72.2 kg (159 lb 1.6 oz)  Physical Exam  Vitals and nursing note reviewed.   Constitutional:       Comments: Conversant and sitting comfortably   HENT:      Head: Atraumatic.   Eyes:      Comments: Patient does have what appears to be a coloboma of the left eye there is no tenderness around the eye no swelling and extraocular movements are intact.   Pulmonary:      Effort: No respiratory distress.   Musculoskeletal:         General: No deformity.      Cervical back: Neck supple.   Neurological:      General: No focal deficit present.      Mental Status: She " is alert and oriented to person, place, and time.   Psychiatric:         Mood and Affect: Mood normal.           ED Course, Procedures, & Data     ED Course as of 03/06/23 1659   Mon Mar 06, 2023   1439 Ophthalmology paged   1456 Ophthalmology paged to see and called back and will see the patient after 5:00.     Procedures          Results for orders placed or performed during the hospital encounter of 03/06/23   Head CT w/o contrast     Status: None    Narrative    EXAM: CT HEAD W/O CONTRAST, CT ORBITAL W/O CONTRAST  LOCATION: [facility]  DATE/TIME: 3/6/2023 12:42 PM    INDICATION: trauma, dilated pupil  COMPARISON: Face CT 08/07/2018  TECHNIQUE:   1) Routine CT Head without IV contrast. Multiplanar reformats. Dose reduction techniques were used.  2) Routine CT Orbits Bones without IV contrast. Multiplanar reformats. Dose reduction techniques were used.    FINDINGS:  HEAD CT:   INTRACRANIAL CONTENTS: No intracranial hemorrhage. Mild diffuse cerebral parenchymal volume loss. No midline shift. The basilar cisterns are patent. No cerebellar tonsillar ectopia. Mild periventricular and scattered foci of deep white matter   hypodensities involving both cerebral hemispheres. No CT evidence for an acute infarct.    OSSEOUS STRUCTURES/SOFT TISSUES: No depressed skull fractures.     ORBITS CT:  OSSEOUS STRUCTURES/SOFT TISSUES: Mild left preorbital soft tissue swelling. The walls of the orbits are intact. No zygomatic arch fractures. No maxillary sinus wall fractures. No nasal bone fractures. Mild nasoseptal bowing to the left anteriorly.    ORBITAL CONTENTS: Prior bilateral cataract surgery. Visualized portions of the orbits are otherwise unremarkable.    SINUSES: No paranasal sinus mucosal disease.      Impression    IMPRESSION:  HEAD CT:  1.  No intracranial hemorrhage, mass lesions, hydrocephalus or CT evidence for an acute infarct.  2.  Mild diffuse cerebral parenchymal volume loss. Presumed  chronic hypertensive/microvascular ischemic white matter changes.    ORBITS CT:  1.  Mild left preorbital soft tissue swelling.  2.  No facial bone fractures.     CT Orbits wo Contrast     Status: None    Narrative    EXAM: CT HEAD W/O CONTRAST, CT ORBITAL W/O CONTRAST  LOCATION: [facility]  DATE/TIME: 3/6/2023 12:42 PM    INDICATION: trauma, dilated pupil  COMPARISON: Face CT 08/07/2018  TECHNIQUE:   1) Routine CT Head without IV contrast. Multiplanar reformats. Dose reduction techniques were used.  2) Routine CT Orbits Bones without IV contrast. Multiplanar reformats. Dose reduction techniques were used.    FINDINGS:  HEAD CT:   INTRACRANIAL CONTENTS: No intracranial hemorrhage. Mild diffuse cerebral parenchymal volume loss. No midline shift. The basilar cisterns are patent. No cerebellar tonsillar ectopia. Mild periventricular and scattered foci of deep white matter   hypodensities involving both cerebral hemispheres. No CT evidence for an acute infarct.    OSSEOUS STRUCTURES/SOFT TISSUES: No depressed skull fractures.     ORBITS CT:  OSSEOUS STRUCTURES/SOFT TISSUES: Mild left preorbital soft tissue swelling. The walls of the orbits are intact. No zygomatic arch fractures. No maxillary sinus wall fractures. No nasal bone fractures. Mild nasoseptal bowing to the left anteriorly.    ORBITAL CONTENTS: Prior bilateral cataract surgery. Visualized portions of the orbits are otherwise unremarkable.    SINUSES: No paranasal sinus mucosal disease.      Impression    IMPRESSION:  HEAD CT:  1.  No intracranial hemorrhage, mass lesions, hydrocephalus or CT evidence for an acute infarct.  2.  Mild diffuse cerebral parenchymal volume loss. Presumed chronic hypertensive/microvascular ischemic white matter changes.    ORBITS CT:  1.  Mild left preorbital soft tissue swelling.  2.  No facial bone fractures.         Ophthalmology evaluated the patient in the ER and found her to have a dislocated lens in  the left eye.  At this time patient will be sent home with follow-up per ophthalmology.    Assessment & Plan      I have reviewed the nursing notes and discussed the case with ophthalmology.        Final diagnoses:   Dislocation of intraocular lens, initial encounter - left         IDianne, am serving as a trained medical scribe to document services personally performed by Vikas Melgar MD, based on the provider's statements to me.      IVikas MD, was physically present and have reviewed and verified the accuracy of this note documented by Dianne George.    Vikas Melgar MD  [facility] EMERGENCY DEPARTMENT  3/6/2023     Vikas Melgar MD  03/06/23 [id number]

## 2023-03-14 ENCOUNTER — OFFICE VISIT (OUTPATIENT)
Dept: INTERNAL MEDICINE | Facility: CLINIC | Age: 83
End: 2023-03-14
Payer: COMMERCIAL

## 2023-03-14 VITALS
SYSTOLIC BLOOD PRESSURE: 139 MMHG | HEART RATE: 47 BPM | OXYGEN SATURATION: 100 % | WEIGHT: 161.3 LBS | DIASTOLIC BLOOD PRESSURE: 76 MMHG | BODY MASS INDEX: 27.54 KG/M2 | HEIGHT: 64 IN | TEMPERATURE: 97.6 F

## 2023-03-14 DIAGNOSIS — Z01.818 PREOPERATIVE EXAMINATION: Primary | ICD-10-CM

## 2023-03-14 LAB
HGB BLD-MCNC: 13.4 G/DL (ref 11.7–15.7)
POTASSIUM SERPL-SCNC: 4.5 MMOL/L (ref 3.4–5.3)

## 2023-03-14 PROCEDURE — 85018 HEMOGLOBIN: CPT | Performed by: INTERNAL MEDICINE

## 2023-03-14 PROCEDURE — 84132 ASSAY OF SERUM POTASSIUM: CPT | Performed by: INTERNAL MEDICINE

## 2023-03-14 PROCEDURE — 36415 COLL VENOUS BLD VENIPUNCTURE: CPT | Performed by: INTERNAL MEDICINE

## 2023-03-14 PROCEDURE — 99214 OFFICE O/P EST MOD 30 MIN: CPT | Performed by: INTERNAL MEDICINE

## 2023-03-14 ASSESSMENT — PAIN SCALES - GENERAL: PAINLEVEL: NO PAIN (0)

## 2023-03-14 NOTE — PROGRESS NOTES
[address] [address]  Phone: [phone]  Fax: [phone]  Primary Provider: Donna Buchanan  Pre-op Performing Provider: PETE MURRAY    {Provider  Link to PREOP SmartSet  Use this to apply standard patient instructions to AVS; includes medication directions, common orders, guidelines for anemia, warfarin, additional testing   :925004}  PREOPERATIVE EVALUATION:  Today's date: 3/14/2023    Alexandra Menezes is a 82 year old female who presents for a preoperative evaluation.    Surgical Information:  Surgery/Procedure: LEFT VITRECTOMY POSTERIOR 25 GAUGE SYSTEM, MEMBRANE PEEL, INTRAOCULAR LENS EXCHANGE  Surgery Location: [facility]  Surgeon: Adan Borja MD  Surgery Date: 3/16/23  Time of Surgery: 7:15 am  Where patient plans to recover: At home with family  Fax number for surgical facility: [phone]    Type of Anesthesia Anticipated: to be determined    {2021 Provider Charting Preference for Preop :325906}    Subjective     HPI related to upcoming procedure: ***    Preop Questions 3/14/2023   1. Have you ever had a heart attack or stroke? No   2. Have you ever had surgery on your heart or blood vessels, such as a stent placement, a coronary artery bypass, or surgery on an artery in your head, neck, heart, or legs? No   3. Do you have chest pain with activity? No   4. Do you have a history of  heart failure? No   5. Do you currently have a cold, bronchitis or symptoms of other infection? No   6. Do you have a cough, shortness of breath, or wheezing? No   7. Do you or anyone in your family have previous history of blood clots? No   8. Do you or does anyone in your family have a serious bleeding problem such as prolonged bleeding following surgeries or cuts? No   9. Have you ever had problems with anemia or been told to take iron pills? No   10. Have you had any abnormal blood loss such as black,  tarry or bloody stools, or abnormal vaginal bleeding? No   11. Have you ever had a blood transfusion? No   12. Are you willing to have a blood transfusion if it is medically needed before, during, or after your surgery? Yes   13. Have you or any of your relatives ever had problems with anesthesia? UNKNOWN - ***   14. Do you have sleep apnea, excessive snoring or daytime drowsiness? No   15. Do you have any artifical heart valves or other implanted medical devices like a pacemaker, defibrillator, or continuous glucose monitor? No   16. Do you have artificial joints? YES - ***   17. Are you allergic to latex? No       Health Care Directive:  Patient does not have a Health Care Directive or Living Will: Patient states has Advance Directive and will bring in a copy to clinic.    Preoperative Review of :  {Mnpmpreport:402597}  {Review MNPMP for all patients per [facility] MNPMP Profile:601397}    {Chronic problem details (Optional) :113285}    Review of Systems  {ROS Preop Choices:056540}    Patient Active Problem List    Diagnosis Date Noted     Atrial fibrillation (H) 07/30/2022     Priority: Medium     Elevated parathyroid hormone 04/17/2022     Priority: Medium     Coronary artery disease involving native coronary artery without angina pectoris 01/28/2022     Priority: Medium     Mild LAD stenosis 20% December 2020 by CTA       Hypercholesterolemia      Priority: Medium     Created by Conversion         Hypothyroidism      Priority: Medium     Created by Conversion  Replacement Utility updated for latest IMO load         CKD (chronic kidney disease) stage 3, GFR 30-59 ml/min (H) 03/21/2019     Priority: Medium     Vitamin D deficiency 06/28/2018     Priority: Medium     Sinus bradycardia      Priority: Medium     Created by Conversion  [facility] Annotation: Feb 13 2012 [id number] - Aakash Smith: Holter -   Frequent   PVC's         Frequent unifocal premature ventricular contractions 01/10/2017     Priority: Medium      Essential hypertension 01/10/2017     Priority: Medium     Localized Osteoarthritis Of The Knee      Priority: Medium     Created by Conversion  Replacement Utility updated for latest IMO load         History of breast cancer 04/08/2015     Priority: Medium     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      Priority: Medium     Created by Conversion          Past Medical History:   Diagnosis Date     Arrhythmia     take propafenone     Arthritis      Breast cancer (H) 1992    right Mastectomy     CKD (chronic kidney disease) stage 3, GFR 30-59 ml/min (H) 3/21/2019     Disease of thyroid gland     hypothyroid     Hypercholesterolemia      Hypertension      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     Sinus bradycardia     frequent PVC's     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     Past Surgical History:   Procedure Laterality Date     BREAST SURGERY Right 1992    Mastectomy for Ca     MASTECTOMY Right 1992     PHACOEMULSIFICATION CLEAR CORNEA WITH STANDARD INTRAOCULAR LENS IMPLANT Bilateral      REPLACEMENT TOTAL KNEE Left 3/21/2019    Procedure: LEFT TOTAL KNEE ARTHROPLASTY,COMPUTER-ASSIST;  Surgeon: Fede Robert MD;  Location: [facility];  Service: Orthopedics     Current Outpatient Medications   Medication Sig Dispense Refill     atorvastatin (LIPITOR) 20 MG tablet Take 1 tablet (20 mg) by mouth daily 90 tablet 3     calcium carbonate-vitamin D3 (CALCIUM 600 + D,3,) 600 mg(1,500mg) -400 unit per tablet Take 2 tablets by mouth At Bedtime        cholecalciferol 50 MCG (2000 UT) tablet Take 2,000 Units by mouth At Bedtime       dorzolamide-timolol (COSOPT) 2-0.5 % ophthalmic solution Place 1 drop Into the left eye 2 times daily 10 mL 1     latanoprost (XALATAN) 0.005 % ophthalmic solution Place 1 drop into both eyes At Bedtime   3     levothyroxine (SYNTHROID/LEVOTHROID) 25 MCG tablet TAKE 1 TABLET BY MOUTH DAILY AT 6 AM 90 tablet 2     lisinopril (ZESTRIL) 5 MG  "tablet Take 1 tablet (5 mg) by mouth daily 90 tablet 3     omega-3 fatty acids-fish oil (OMEGA 3 FISH OIL) 684-1,200 mg CpDR [OMEGA-3 FATTY ACIDS-FISH OIL (OMEGA 3 FISH OIL) 684-1,200 MG CPDR] Take 2 tablets by mouth every morning.       prednisoLONE acetate (PRED FORTE) 1 % ophthalmic suspension Place 1 drop Into the left eye 4 times daily 10 mL 1     propafenone (RYTHMOL) 150 MG TABS tablet TAKE 1 TABLET BY MOUTH 2 TIMES DAILY 180 tablet 3       No Known Allergies     Social History     Tobacco Use     Smoking status: Never     Smokeless tobacco: Never   Substance Use Topics     Alcohol use: Yes     Alcohol/week: 2.0 standard drinks     Comment: occ     {FAMILY HISTORY (Optional):564496096}  History   Drug Use No         Objective     /76 (BP Location: Right arm, Patient Position: Sitting, Cuff Size: Adult Regular)   Pulse (!) 47   Temp 97.6  F (36.4  C) (Oral)   Ht 1.626 m (5' 4\")   Wt 73.2 kg (161 lb 4.8 oz)   SpO2 100%   BMI 27.69 kg/m      Physical Exam  {EXAM Preop Choices:393930}    Recent Labs   Lab Test 22  1103 22  0935 21  0035 21  1708   HGB 13.2 13.4   < > 12.9    208   < > 208   INR  --   --   --  0.97    141   < > 139   POTASSIUM 4.5 4.6   < > 4.7   CR 1.13* 1.26*   < > 1.23*    < > = values in this interval not displayed.        Diagnostics:  {LABS:707608}   {EK}    Revised Cardiac Risk Index (RCRI):  The patient has the following serious cardiovascular risks for perioperative complications:  {PREOP REVISED CARDIAC RISK INDEX (RCRI) :015378::\" - No serious cardiac risks = 0 points\"}     RCRI Interpretation: {REVISED CARDIAC RISK INTERPRETATION :896992}         Signed Electronically by: Zion Guo MD  Copy of this evaluation report is provided to requesting physician.    {Provider Resources  Preop [facility] Preop Guidelines  Revised Cardiac Risk Index :684586}  "

## 2023-03-14 NOTE — PROGRESS NOTES
Pre-Op Note:  Today's date: March 14, 2023    Alexandra Menezes is a 82 year old female who presents for a preoperative evaluation.    Surgical Information:  Surgery/Procedure: Preoperative examination in anticipation of emergency left eye surgery for replacement of lens after dislocation after swimming accident when she was hit by a popped up little.  Aerobic swimming.  Surgery Location: [facility] [place].  March 16, 2023.  Surgeon: [facility] Dr. 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  Previously she had had cataract surgery in the left eye and now she has blurring no drainage.  Needs emergency lens replacement for dislocated lens left eye.    ROS:   14 point negative    Medications:     Current Outpatient Medications:      atorvastatin (LIPITOR) 20 MG tablet, Take 1 tablet (20 mg) by mouth daily, Disp: 90 tablet, Rfl: 3     calcium carbonate-vitamin D3 (CALCIUM 600 + D,3,) 600 mg(1,500mg) -400 unit per tablet, Take 2 tablets by mouth At Bedtime , Disp: , Rfl:      cholecalciferol 50 MCG (2000 UT) tablet, Take 2,000 Units by mouth At Bedtime, Disp: , Rfl:      dorzolamide-timolol (COSOPT) 2-0.5 % ophthalmic solution, Place 1 drop Into the left eye 2 times daily, Disp: 10 mL, Rfl: 1     latanoprost (XALATAN) 0.005 % ophthalmic solution, Place 1 drop into both eyes At Bedtime , Disp: , Rfl: 3     levothyroxine (SYNTHROID/LEVOTHROID) 25 MCG tablet, TAKE 1 TABLET BY MOUTH DAILY AT 6 AM, Disp: 90 tablet, Rfl: 2     lisinopril (ZESTRIL) 5 MG tablet, Take 1 tablet (5 mg) by mouth daily, Disp: 90 tablet, Rfl: 3     omega-3 fatty acids-fish oil (OMEGA 3 FISH OIL) 684-1,200 mg CpDR, [OMEGA-3 FATTY ACIDS-FISH OIL (OMEGA 3 FISH OIL) 684-1,200 MG CPDR] Take 2 tablets by mouth  every morning., Disp: , Rfl:      prednisoLONE acetate (PRED FORTE) 1 % ophthalmic suspension, Place 1 drop Into the left eye 4 times daily, Disp: 10 mL, Rfl: 1     propafenone (RYTHMOL) 150 MG TABS tablet, TAKE 1 TABLET BY MOUTH 2 TIMES DAILY, Disp: 180 tablet, Rfl: 3     Allergies:  No Known Allergies    Immunizations:   Immunization History   Administered Date(s) Administered     COVID-19 Vaccine 12+ (Pfizer) 03/01/2021, 03/22/2021, 10/22/2021     COVID-19 Vaccine Bivalent Booster 12+ (Pfizer) 10/14/2022     COVID-19,PF,Moderna Booster 05/12/2022     DT (PEDS <7y) 12/11/2000, 07/29/2003     FLUAD(HD)65+ QUAD 11/08/2021     Flu, Unspecified 09/07/2018     HepA, Unspecified 10/26/2006     HepA-Adult 04/25/2006     Influenza Vaccine 65+ (Fluzone HD) 09/24/2020, 10/14/2022     Meningococcal (Menomune ) 04/25/2006     Pneumo Conj 13-V (2010&after) 06/29/2015     Pneumococcal 23 valent 08/30/2007     Poliovirus, inactivated (IPV) 04/25/2006     Td (Adult), Adsorbed 07/29/2003     Tdap (Adacel,Boostrix) 05/30/2013     Typhoid IM 04/25/2006     Typhoid Oral 05/26/2009     Zoster vaccine, live 12/31/2009     Immunizations reviewed and up-to-date.    Health Maintenance:   Reviewed and up-to-date.    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  No anesthetic complications previously.  No eye pain currently blurred vision left eye uses drops now.    Non-smoker    No excess alcohol.    No known drug allergies.    Successful breast cancer surgery 1993 mastectomy no XRT no chemotherapy  "subsequent to mastectomy.  No sign of recurrence thankfully.    History of hypertension hyperlipidemia hypothyroidism and atrial fibrillation with PVCs not on anticoagulation.  Rythmol noted.  Retired .  A .  Past Surgical History:   Past Surgical History:   Procedure Laterality Date     BREAST SURGERY Right     Mastectomy for Ca     MASTECTOMY Right      PHACOEMULSIFICATION CLEAR CORNEA WITH STANDARD INTRAOCULAR LENS IMPLANT Bilateral      REPLACEMENT TOTAL KNEE Left 3/21/2019    Procedure: LEFT TOTAL KNEE ARTHROPLASTY,COMPUTER-ASSIST;  Surgeon: Fede Robert MD;  Location: [facility] OR;  Service: Orthopedics        Family History:   Family History   Problem Relation Age of Onset     Rheumatoid Arthritis Mother      Valvular heart disease Father      Heart Disease Maternal Grandmother      Heart Disease Brother      CABG Brother      Hyperthyroidism Sister      Heart Disease Sister      Clotting Disorder No family hx of        after open heart surgery age 55 autopsy done cause of death unknown.?  Arrhythmia.    Mother  92 father  77 after valvular heart surgery.  2 children 1 of physician.  5 grandchildren.    Exam:  Vitals:/76 (BP Location: Right arm, Patient Position: Sitting, Cuff Size: Adult Regular)   Pulse (!) 47   Temp 97.6  F (36.4  C) (Oral)   Ht 1.626 m (5' 4\")   Wt 73.2 kg (161 lb 4.8 oz)   SpO2 100%   BMI 27.69 kg/m    Neck veins are nondistended there is no thyromegaly or thyroid nodules she is neatly attired easily conversant good spirited and intelligent she appears younger than her stated age.  Lungs are clear to auscultation no rales rhonchi or wheezes heart tones regular rhythm without murmur rub or gallop belly soft nontender no masses.  Bowel sounds present hypoactive.  No liver spleen tip palpable extremities are free of edema cyanosis or clubbing.  Impaired blurred vision left eye.    Assessment and " Plan:  Medically acceptable risk for anticipated emergency left eye surgery for lens replacement after dislocation of previous lens from aerobic swimming accident as outlined above.  Preoperatively check hemoglobin and potassium level.  The patient has some and her family is experienced no anesthetic complications from any prior surgeries.  We had a good discussion.    Zion Guo MD

## 2023-05-17 ENCOUNTER — OFFICE VISIT (OUTPATIENT)
Dept: FAMILY MEDICINE | Facility: CLINIC | Age: 83
End: 2023-05-17
Payer: COMMERCIAL

## 2023-05-17 VITALS
WEIGHT: 161.1 LBS | SYSTOLIC BLOOD PRESSURE: 128 MMHG | RESPIRATION RATE: 16 BRPM | TEMPERATURE: 97.5 F | HEIGHT: 64 IN | OXYGEN SATURATION: 96 % | BODY MASS INDEX: 27.5 KG/M2 | HEART RATE: 47 BPM | DIASTOLIC BLOOD PRESSURE: 76 MMHG

## 2023-05-17 DIAGNOSIS — N18.31 STAGE 3A CHRONIC KIDNEY DISEASE (H): ICD-10-CM

## 2023-05-17 DIAGNOSIS — Z00.00 MEDICARE ANNUAL WELLNESS VISIT, SUBSEQUENT: Primary | ICD-10-CM

## 2023-05-17 DIAGNOSIS — E78.00 PURE HYPERCHOLESTEROLEMIA: ICD-10-CM

## 2023-05-17 DIAGNOSIS — E03.9 HYPOTHYROIDISM, UNSPECIFIED TYPE: ICD-10-CM

## 2023-05-17 DIAGNOSIS — I49.3 FREQUENT UNIFOCAL PREMATURE VENTRICULAR CONTRACTIONS: ICD-10-CM

## 2023-05-17 DIAGNOSIS — I10 ESSENTIAL HYPERTENSION: ICD-10-CM

## 2023-05-17 DIAGNOSIS — I25.10 CORONARY ARTERY DISEASE INVOLVING NATIVE CORONARY ARTERY OF NATIVE HEART WITHOUT ANGINA PECTORIS: ICD-10-CM

## 2023-05-17 DIAGNOSIS — M85.80 OSTEOPENIA, UNSPECIFIED LOCATION: ICD-10-CM

## 2023-05-17 DIAGNOSIS — E55.9 VITAMIN D DEFICIENCY: ICD-10-CM

## 2023-05-17 LAB
CREAT UR-MCNC: 60.4 MG/DL
ERYTHROCYTE [DISTWIDTH] IN BLOOD BY AUTOMATED COUNT: 13.6 % (ref 10–15)
HCT VFR BLD AUTO: 40.8 % (ref 35–47)
HGB BLD-MCNC: 13 G/DL (ref 11.7–15.7)
MCH RBC QN AUTO: 29.5 PG (ref 26.5–33)
MCHC RBC AUTO-ENTMCNC: 31.9 G/DL (ref 31.5–36.5)
MCV RBC AUTO: 93 FL (ref 78–100)
MICROALBUMIN UR-MCNC: <12 MG/L
MICROALBUMIN/CREAT UR: NORMAL MG/G{CREAT}
PLATELET # BLD AUTO: 175 10E3/UL (ref 150–450)
RBC # BLD AUTO: 4.4 10E6/UL (ref 3.8–5.2)
WBC # BLD AUTO: 4.1 10E3/UL (ref 4–11)

## 2023-05-17 PROCEDURE — G0439 PPPS, SUBSEQ VISIT: HCPCS | Performed by: FAMILY MEDICINE

## 2023-05-17 PROCEDURE — 84443 ASSAY THYROID STIM HORMONE: CPT | Performed by: FAMILY MEDICINE

## 2023-05-17 PROCEDURE — 36415 COLL VENOUS BLD VENIPUNCTURE: CPT | Performed by: FAMILY MEDICINE

## 2023-05-17 PROCEDURE — 80061 LIPID PANEL: CPT | Performed by: FAMILY MEDICINE

## 2023-05-17 PROCEDURE — 83970 ASSAY OF PARATHORMONE: CPT | Performed by: FAMILY MEDICINE

## 2023-05-17 PROCEDURE — 80053 COMPREHEN METABOLIC PANEL: CPT | Performed by: FAMILY MEDICINE

## 2023-05-17 PROCEDURE — 82306 VITAMIN D 25 HYDROXY: CPT | Performed by: FAMILY MEDICINE

## 2023-05-17 PROCEDURE — 82043 UR ALBUMIN QUANTITATIVE: CPT | Performed by: FAMILY MEDICINE

## 2023-05-17 PROCEDURE — 99214 OFFICE O/P EST MOD 30 MIN: CPT | Mod: 25 | Performed by: FAMILY MEDICINE

## 2023-05-17 PROCEDURE — 82570 ASSAY OF URINE CREATININE: CPT | Performed by: FAMILY MEDICINE

## 2023-05-17 PROCEDURE — 85027 COMPLETE CBC AUTOMATED: CPT | Performed by: FAMILY MEDICINE

## 2023-05-17 RX ORDER — KETOROLAC TROMETHAMINE 5 MG/ML
1 SOLUTION OPHTHALMIC 2 TIMES DAILY
COMMUNITY
Start: 2023-05-01

## 2023-05-17 ASSESSMENT — ENCOUNTER SYMPTOMS
HEADACHES: 0
ARTHRALGIAS: 0
HEMATURIA: 0
FEVER: 0
HEARTBURN: 0
NAUSEA: 0
NERVOUS/ANXIOUS: 0
WEAKNESS: 0
SORE THROAT: 0
CHILLS: 0
COUGH: 0
JOINT SWELLING: 0
PARESTHESIAS: 0
PALPITATIONS: 0
MYALGIAS: 0
FREQUENCY: 0
ABDOMINAL PAIN: 0
CONSTIPATION: 0
SHORTNESS OF BREATH: 0
EYE PAIN: 1
DYSURIA: 0
DIZZINESS: 0
DIARRHEA: 0
BREAST MASS: 0
HEMATOCHEZIA: 0

## 2023-05-17 ASSESSMENT — ACTIVITIES OF DAILY LIVING (ADL): CURRENT_FUNCTION: NO ASSISTANCE NEEDED

## 2023-05-17 ASSESSMENT — PAIN SCALES - GENERAL: PAINLEVEL: NO PAIN (0)

## 2023-05-17 NOTE — PATIENT INSTRUCTIONS
You are due for tetanus booster.  I also recommend considering COVID Bivalent booster (second Bivalent vaccine is recommended for those 65 and older) and the shingles vaccine series, Shingrix.  Check your insurance coverage as many insurance companies now prefer vaccines be administered at a pharmacy rather than a clinic.    We will check your thyroid hormone labs today.  If your thyroid is normal, we can try stopping levothyroxine and then rechecking your thyroid in about 2 months.      Patient Education   Personalized Prevention Plan  You are due for the preventive services outlined below.  Your care team is available to assist you in scheduling these services.  If you have already completed any of these items, please share that information with your care team to update in your medical record.  Health Maintenance Due   Topic Date Due    Zoster (Shingles) Vaccine (1 of 2) 02/25/2010    Comprehensive Metabolic Panel  03/24/2022    COVID-19 Vaccine (6 - Pfizer series) 02/14/2023    Cholesterol Lab  05/12/2023    Kidney Microalbumin Urine Test  05/12/2023    ANNUAL REVIEW OF HM ORDERS  05/12/2023    Annual Wellness Visit  05/12/2023    Diptheria Tetanus Pertussis (DTAP/TDAP/TD) Vaccine (2 - Td or Tdap) 05/30/2023

## 2023-05-17 NOTE — PROGRESS NOTES
"SUBJECTIVE:   Alexandra is a 82 year old who presents for Preventive Visit.      3/14/2023     7:23 AM   Additional Questions   Roomed by Catalina       Are you in the first 12 months of your Medicare coverage?  No    No concerns today.  She unfortunately had her intraocular lens dislocated after being hit in the eye with a pool noodle.  Healing well although at the same time the try to fix something of her retina and she has had some ongoing inflammation or low-grade infection.  Following with ophthalmology.  This continues to improve.  History of nonobstructive heart disease, remains on aspirin and atorvastatin and management of this.  History of frequent PVCs, remains on propafenone for this.  Hypertension stable and controlled on lisinopril.  Denies lightheadedness, dizziness, headaches, chest pain, dyspnea, or swelling. History of chronic kidney disease stage IIIa due for follow-up.  History of hyperparathyroidism and vitamin D deficiency, due for follow-up of both of these.  She has reduced her calcium supplement so we will see where that level is today 2.  Remote history of breast cancer, status post right breast mastectomy, has not had evidence of recurrence.  Remains on levothyroxine and management of hypothyroidism, but uncertain that is helpful.  She is interested in trying to stop this if her thyroid labs look good.    Healthy Habits:     In general, how would you rate your overall health?  Good    Frequency of exercise:  4-5 days/week    Duration of exercise:  30-45 minutes    Do you usually eat at least 4 servings of fruit and vegetables a day, include whole grains    & fiber and avoid regularly eating high fat or \"junk\" foods?  Yes    Taking medications regularly:  Yes    Medication side effects:  None    Ability to successfully perform activities of daily living:  No assistance needed    Home Safety:  Lack of grab bars in the bathroom    Hearing Impairment:  No hearing concerns    In the past 6 months, " have you been bothered by leaking of urine?  No    In general, how would you rate your overall mental or emotional health?  Good      PHQ-2 Total Score: 0    Additional concerns today:  No        Have you ever done Advance Care Planning? (For example, a Health Directive, POLST, or a discussion with a medical provider or your loved ones about your wishes): Yes, patient states has an Advance Care Planning document and will bring a copy to the clinic.       Fall risk  Fallen 2 or more times in the past year?: No  Any fall with injury in the past year?: No    Cognitive Screening   1) Repeat 3 items (Leader, Season, Table)    2) Clock draw: NORMAL  3) 3 item recall: Recalls 2 objects   Results: NORMAL clock, 1-2 items recalled: COGNITIVE IMPAIRMENT LESS LIKELY    Mini-CogTM Copyright S Jeremias. Licensed by the author for use in [facility]; reprinted with permission (sunil@[facility]). All rights reserved.        Reviewed and updated as needed this visit by clinical staff   Tobacco  Allergies  Meds              Reviewed and updated as needed this visit by Provider                 Social History     Tobacco Use     Smoking status: Never     Smokeless tobacco: Never   Vaping Use     Vaping status: Never Used   Substance Use Topics     Alcohol use: Yes     Alcohol/week: 2.0 standard drinks of alcohol     Comment: occ             5/17/2023     1:14 PM   Alcohol Use   Prescreen: >3 drinks/day or >7 drinks/week? No     Do you have a current opioid prescription? No  Do you use any other controlled substances or medications that are not prescribed by a provider? None    Current providers sharing in care for this patient include:   Patient Care Team:  Donna Buchanan MD as PCP - General  Donna Buchanan MD as Assigned PCP  Crow Machuca MD as MD (Cardiovascular Disease)  Crow Machuca MD as Assigned Heart and Vascular Provider    The following health maintenance items are reviewed in Epic and correct as of  today:  Health Maintenance   Topic Date Due     ZOSTER IMMUNIZATION (1 of 2) 02/25/2010     CMP  03/24/2022     COVID-19 Vaccine (6 - Pfizer series) 02/14/2023     LIPID  05/12/2023     MICROALBUMIN  05/12/2023     ANNUAL REVIEW OF HM ORDERS  05/12/2023     MEDICARE ANNUAL WELLNESS VISIT  05/12/2023     DTAP/TDAP/TD IMMUNIZATION (2 - Td or Tdap) 05/30/2023     BMP  07/27/2023     TSH W/FREE T4 REFLEX  07/27/2023     HEMOGLOBIN  03/14/2024     FALL RISK ASSESSMENT  05/17/2024     ADVANCE CARE PLANNING  05/12/2027     DEXA  06/20/2027     PHQ-2 (once per calendar year)  Completed     INFLUENZA VACCINE  Completed     Pneumococcal Vaccine: 65+ Years  Completed     URINALYSIS  Completed     IPV IMMUNIZATION  Aged Out     MENINGITIS IMMUNIZATION  Aged Out     Lab work is in process  Labs reviewed in EPIC  BP Readings from Last 3 Encounters:   05/17/23 128/76   03/14/23 139/76   03/06/23 (!) 161/101    Wt Readings from Last 3 Encounters:   05/17/23 73.1 kg (161 lb 1.6 oz)   03/14/23 73.2 kg (161 lb 4.8 oz)   03/06/23 72.2 kg (159 lb 1.6 oz)                  Patient Active Problem List   Diagnosis     Sinus bradycardia     Hypercholesterolemia     Hypothyroidism     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     Localized Osteoarthritis Of The Knee     History of breast cancer     Frequent unifocal premature ventricular contractions     Essential hypertension     Vitamin D deficiency     CKD (chronic kidney disease) stage 3, GFR 30-59 ml/min (H)     Coronary artery disease involving native coronary artery without angina pectoris     Elevated parathyroid hormone     Atrial fibrillation (H)     Past Surgical History:   Procedure Laterality Date     BREAST SURGERY Right 1992    Mastectomy for Ca     MASTECTOMY Right 1992     PHACOEMULSIFICATION CLEAR CORNEA WITH STANDARD INTRAOCULAR LENS IMPLANT Bilateral      REPLACEMENT TOTAL KNEE Left 3/21/2019    Procedure: LEFT TOTAL KNEE ARTHROPLASTY,COMPUTER-ASSIST;   Surgeon: Fede Robert MD;  Location: [facility] Main OR;  Service: Orthopedics       Social History     Tobacco Use     Smoking status: Never     Smokeless tobacco: Never   Vaping Use     Vaping status: Never Used   Substance Use Topics     Alcohol use: Yes     Alcohol/week: 2.0 standard drinks of alcohol     Comment: occ     Family History   Problem Relation Age of Onset     Rheumatoid Arthritis Mother      Valvular heart disease Father      Heart Disease Maternal Grandmother      Heart Disease Brother      CABG Brother      Hyperthyroidism Sister      Heart Disease Sister      Clotting Disorder No family hx of          Current Outpatient Medications   Medication Sig Dispense Refill     atorvastatin (LIPITOR) 20 MG tablet Take 1 tablet (20 mg) by mouth daily 90 tablet 3     calcium carbonate-vitamin D3 (CALCIUM 600 + D,3,) 600 mg(1,500mg) -400 unit per tablet Take 2 tablets by mouth At Bedtime        cholecalciferol 50 MCG (2000 UT) tablet Take 2,000 Units by mouth At Bedtime       ketorolac (ACULAR) 0.5 % ophthalmic solution PUT 1 DROP IN LEFT EYE 4 TIMES A DAY       latanoprost (XALATAN) 0.005 % ophthalmic solution Place 1 drop into both eyes At Bedtime   3     levothyroxine (SYNTHROID/LEVOTHROID) 25 MCG tablet TAKE 1 TABLET BY MOUTH DAILY AT 6 AM 90 tablet 2     lisinopril (ZESTRIL) 5 MG tablet Take 1 tablet (5 mg) by mouth daily 90 tablet 3     omega-3 fatty acids-fish oil (OMEGA 3 FISH OIL) 684-1,200 mg CpDR [OMEGA-3 FATTY ACIDS-FISH OIL (OMEGA 3 FISH OIL) 684-1,200 MG CPDR] Take 2 tablets by mouth every morning.       prednisoLONE acetate (PRED FORTE) 1 % ophthalmic suspension Place 1 drop Into the left eye 4 times daily 10 mL 1     propafenone (RYTHMOL) 150 MG TABS tablet TAKE 1 TABLET BY MOUTH 2 TIMES DAILY 180 tablet 3     No Known Allergies  Recent Labs   Lab Test 03/14/23  0749 07/27/22  1103 05/12/22  0935 09/03/21  1708 03/24/21  1101 06/30/20  1140   LDL  --   --  113  --  205* 211*   HDL  --    "--  90  --  87 83   TRIG  --   --  59  --  104 77   ALT  --   --  22  --  19 21   CR  --  1.13* 1.26*   < > 1.07 1.20*   GFRESTIMATED  --  49* 43*   < > 50* 44*   GFRESTBLACK  --   --   --   --  60* 53*   POTASSIUM 4.5 4.5 4.6   < > 4.6 5.0   TSH  --  2.95 2.25  --  3.15 2.36    < > = values in this interval not displayed.        Mammogram Screening - Patient over age 75, has elected to continue with screening.  Pertinent mammograms are reviewed under the imaging tab.    Review of Systems   Constitutional: Negative for chills and fever.   HENT: Negative for congestion, ear pain, hearing loss and sore throat.    Eyes: Positive for pain and visual disturbance.   Respiratory: Negative for cough and shortness of breath.    Cardiovascular: Negative for chest pain, palpitations and peripheral edema.   Gastrointestinal: Negative for abdominal pain, constipation, diarrhea, heartburn, hematochezia and nausea.   Breasts:  Negative for tenderness, breast mass and discharge.   Genitourinary: Negative for dysuria, frequency, genital sores, hematuria, pelvic pain, urgency, vaginal bleeding and vaginal discharge.   Musculoskeletal: Negative for arthralgias, joint swelling and myalgias.   Skin: Negative for rash.   Neurological: Negative for dizziness, weakness, headaches and paresthesias.   Psychiatric/Behavioral: Negative for mood changes. The patient is not nervous/anxious.        OBJECTIVE:   /76   Pulse (!) 47   Temp 97.5  F (36.4  C) (Oral)   Resp 16   Ht 1.626 m (5' 4\")   Wt 73.1 kg (161 lb 1.6 oz)   SpO2 96%   BMI 27.65 kg/m   Estimated body mass index is 27.65 kg/m  as calculated from the following:    Height as of this encounter: 1.626 m (5' 4\").    Weight as of this encounter: 73.1 kg (161 lb 1.6 oz).  Physical Exam  GENERAL APPEARANCE: healthy, alert and no distress  EYES: Eyes grossly normal to inspection, PERRL and conjunctivae and sclerae normal  HENT: ear canals and TM's normal, nose and mouth without " ulcers or lesions, oropharynx clear and oral mucous membranes moist  NECK: no adenopathy, no asymmetry, masses, or scars and thyroid normal to palpation  RESP: lungs clear to auscultation - no rales, rhonchi or wheezes  BREAST: Right breast surgically absent, no masses of chest wall.  Left breast normal without masses, tenderness or nipple discharge and no palpable axillary masses or adenopathy  CV: regular rate and rhythm, normal S1 S2, no S3 or S4, no murmur, click or rub, no peripheral edema and peripheral pulses strong  ABDOMEN: soft, nontender, no hepatosplenomegaly, no masses and bowel sounds normal  MS: no musculoskeletal defects are noted and gait is age appropriate without ataxia  SKIN: no suspicious lesions or rashes  NEURO: Normal strength and tone, sensory exam grossly normal, mentation intact and speech normal  PSYCH: mentation appears normal and affect normal/bright    Diagnostic Test Results:  Labs reviewed in Epic    ASSESSMENT / PLAN:     Medicare annual wellness visit, subsequent  At today's visit, we discussed lifestyle interventions to promote self-management and wellness, including maintenance of a healthy weight, healthy diet, regular physical activity and exercise, and falls prevention.  Encourage consideration of Tdap, Bivalent COVID-vaccine, and Shingrix, to check insurance coverage first.  Up-to-date with routine cancer screening, bone density screening.    Stage 3a chronic kidney disease (H)  We will check follow-up kidney function, will assess for secondary effects including hyperparathyroidism, vitamin D deficiency, anemia, and microalbuminuria.  - COMPREHENSIVE METABOLIC PANEL; Future  - Albumin Random Urine Quantitative with Creat Ratio; Future  - Parathyroid Hormone Intact; Future  - Vitamin D Deficiency; Future  - CBC with platelets; Future  - COMPREHENSIVE METABOLIC PANEL  - Parathyroid Hormone Intact  - Vitamin D Deficiency  - CBC with platelets  - Albumin Random Urine Quantitative  "with Creat Ratio    Coronary artery disease involving native coronary artery of native heart without angina pectoris  Stable and asymptomatic, continue aspirin and statin.  We will check lipids.  - Lipid panel reflex to direct LDL Non-fasting; Future  - Lipid panel reflex to direct LDL Non-fasting    Vitamin D deficiency  Encouraged daily supplement.  We will check vitamin D level and parathyroid hormone level.  - Parathyroid Hormone Intact; Future  - Vitamin D Deficiency; Future  - Parathyroid Hormone Intact  - Vitamin D Deficiency    Hypercholesterolemia  Continue atorvastatin.  Advise healthy lifestyle habits.  We will check nonfasting lipids today.    Hypothyroidism, unspecified type  We will check TSH today.  If TSH is in the normal range, will do a trial of discontinuing levothyroxine with plans to reassess TSH in about 2 months.  - TSH with free T4 reflex; Future  - TSH with free T4 reflex    Essential hypertension  Stable and controlled on lisinopril, continue.    Frequent unifocal premature ventricular contractions  Stable and controlled on propafenone.  She will continue to follow with cardiology.    Osteopenia  No history of fracture.  Continue adequate calcium and vitamin D intake, measures to reduce risk of falls, and weightbearing exercise.  Up-to-date with bone density screening.    Patient has been advised of split billing requirements and indicates understanding: Yes      COUNSELING:  Reviewed preventive health counseling, as reflected in patient instructions      BMI:   Estimated body mass index is 27.65 kg/m  as calculated from the following:    Height as of this encounter: 1.626 m (5' 4\").    Weight as of this encounter: 73.1 kg (161 lb 1.6 oz).   Weight management plan: Discussed healthy diet and exercise guidelines      She reports that she has never smoked. She has never used smokeless tobacco.      Appropriate preventive services were discussed with this patient, including applicable " screening as appropriate for cardiovascular disease, diabetes, osteopenia/osteoporosis, and glaucoma.  As appropriate for age/gender, discussed screening for colorectal cancer, prostate cancer, breast cancer, and cervical cancer. Checklist reviewing preventive services available has been given to the patient.    Reviewed patients plan of care and provided an AVS. The Basic Care Plan (routine screening as documented in Health Maintenance) for Alexandra meets the Care Plan requirement. This Care Plan has been established and reviewed with the Patient.          Donna Buchanan MD  [facility]    Identified Health Risks:    I have reviewed Opioid Use Disorder and Substance Use Disorder risk factors and made any needed referrals.

## 2023-05-18 LAB
ALBUMIN SERPL BCG-MCNC: 4.3 G/DL (ref 3.5–5.2)
ALP SERPL-CCNC: 78 U/L (ref 35–104)
ALT SERPL W P-5'-P-CCNC: 24 U/L (ref 10–35)
ANION GAP SERPL CALCULATED.3IONS-SCNC: 13 MMOL/L (ref 7–15)
AST SERPL W P-5'-P-CCNC: 31 U/L (ref 10–35)
BILIRUB SERPL-MCNC: 0.5 MG/DL
BUN SERPL-MCNC: 17.8 MG/DL (ref 8–23)
CALCIUM SERPL-MCNC: 9.9 MG/DL (ref 8.8–10.2)
CHLORIDE SERPL-SCNC: 105 MMOL/L (ref 98–107)
CHOLEST SERPL-MCNC: 198 MG/DL
CREAT SERPL-MCNC: 1.02 MG/DL (ref 0.51–0.95)
DEPRECATED CALCIDIOL+CALCIFEROL SERPL-MC: 71 UG/L (ref 20–75)
DEPRECATED HCO3 PLAS-SCNC: 23 MMOL/L (ref 22–29)
GFR SERPL CREATININE-BSD FRML MDRD: 55 ML/MIN/1.73M2
GLUCOSE SERPL-MCNC: 84 MG/DL (ref 70–99)
HDLC SERPL-MCNC: 98 MG/DL
LDLC SERPL CALC-MCNC: 86 MG/DL
NONHDLC SERPL-MCNC: 100 MG/DL
POTASSIUM SERPL-SCNC: 4.4 MMOL/L (ref 3.4–5.3)
PROT SERPL-MCNC: 6.6 G/DL (ref 6.4–8.3)
PTH-INTACT SERPL-MCNC: 55 PG/ML (ref 15–65)
SODIUM SERPL-SCNC: 141 MMOL/L (ref 136–145)
TRIGL SERPL-MCNC: 72 MG/DL
TSH SERPL DL<=0.005 MIU/L-ACNC: 2.7 UIU/ML (ref 0.3–4.2)

## 2023-08-14 ENCOUNTER — TRANSFERRED RECORDS (OUTPATIENT)
Dept: HEALTH INFORMATION MANAGEMENT | Facility: CLINIC | Age: 83
End: 2023-08-14

## 2023-09-22 NOTE — PROGRESS NOTES
Alexandra Menezes,  1940, MRN [id number]    PCP: Donna Buchanan, [phone]    Assessment: Chronic ventricular ectopy unifocal PVCs  Hyperlipidemia on atorvastatin  Minimal coronary atherosclerosis on CT angiography  Chronic essential hypertension on medical therapy    Recommendations: Lightheaded  Hx ventricular ecopy  HTN    Chief Complaint: Ventricular ectopy    HPI:  We have been requested by Dr. Buchanan to evaluate Alexandra Menezes for consultation who is a  82 year old year old female for above chief complaint.    Hx: LH spells  New patient evaluation referral.  Patient formally seen by Dr. Smith who has retired.  Patient here to establish care with new cardiologist.    Patient has had a distant history of syncope and known chronic unifocal PVCs.  No complex arrhythmic ectopy has been identified.  Dr. Smith has managed patient with propafenone 150 twice a day.    Patient has minimal coronary atherosclerosis on CTA .  History of hypertension.  LDL cholesterol is 113.  Hx imbalance and fall  with fracture of arm.  Mild lesions r and l carotid  Occasional weakness.  Every other day.  Walks and feels LH, unsteady, weak. Has not seen neurology.  Weakness usually standing or walking. Began about a year ago.  At times more worse than others, varies.  Denies any cardiac sx when LH occurs  Breast discomfort in past.  List of BP reviewed and range SBP .  Reduce lisinopril to 5 on , has not noticed any change.    Current Outpatient Medications:      atorvastatin (LIPITOR) 20 MG tablet, TAKE 1 TABLET BY MOUTH EVERY DAY, Disp: 90 tablet, Rfl: 1     calcium carbonate-vitamin D3 (CALCIUM 600 + D,3,) 600 mg(1,500mg) -400 unit per tablet, Take 2 tablets by mouth At Bedtime , Disp: , Rfl:      cholecalciferol 50 MCG (2000 UT) tablet, Take 2,000 Units by mouth At Bedtime , Disp: , Rfl:      latanoprost (XALATAN) 0.005 % ophthalmic solution, Place 1 drop into both eyes At Bedtime , Disp: , Rfl:  3     levothyroxine (SYNTHROID/LEVOTHROID) 25 MCG tablet, TAKE 1 TABLET BY MOUTH DAILY AT 6 AM, Disp: 90 tablet, Rfl: 2     lisinopril (ZESTRIL) 5 MG tablet, Take 1 tablet (5 mg) by mouth daily, Disp: 90 tablet, Rfl: 3     omega-3 fatty acids-fish oil (OMEGA 3 FISH OIL) 684-1,200 mg CpDR, [OMEGA-3 FATTY ACIDS-FISH OIL (OMEGA 3 FISH OIL) 684-1,200 MG CPDR] Take 2 tablets by mouth every morning., Disp: , Rfl:      propafenone (RYTHMOL) 150 MG TABS tablet, Take 1 tablet (150 mg) by mouth 2 times daily, Disp: 180 tablet, Rfl: 3    Medical History  Past Medical History:   Diagnosis Date     Arrhythmia     take propafenone     Arthritis      Breast cancer (H) 1992    right Mastectomy     CKD (chronic kidney disease) stage 3, GFR 30-59 ml/min (H) 3/21/2019     Disease of thyroid gland     hypothyroid     Hypercholesterolemia      Hypertension      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     Sinus bradycardia     frequent PVC's     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      Past Medical History:   Diagnosis Date     Arrhythmia     take propafenone     Arthritis      Breast cancer (H) 1992    right Mastectomy     CKD (chronic kidney disease) stage 3, GFR 30-59 ml/min (H) 3/21/2019     Disease of thyroid gland     hypothyroid     Hypercholesterolemia      Hypertension      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     Sinus bradycardia     frequent PVC's     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      PAST MEDICAL HISTORY:   Past Medical History:   Diagnosis Date     Arrhythmia     take propafenone     Arthritis      Breast cancer (H) 1992    right Mastectomy     CKD (chronic kidney disease) stage 3, GFR 30-59 ml/min (H) 3/21/2019     Disease of thyroid gland     hypothyroid     Hypercholesterolemia      Hypertension      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     Sinus bradycardia     frequent PVC's     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       PAST SURGICAL  HISTORY:   Past Surgical History:   Procedure Laterality Date     BREAST SURGERY Right 1992    Mastectomy for Ca     MASTECTOMY Right 1992     PHACOEMULSIFICATION CLEAR CORNEA WITH STANDARD INTRAOCULAR LENS IMPLANT Bilateral      REPLACEMENT TOTAL KNEE Left 3/21/2019    Procedure: LEFT TOTAL KNEE ARTHROPLASTY,COMPUTER-ASSIST;  Surgeon: Fede Robert MD;  Location: [facility];  Service: Orthopedics       FAMILY HISTORY:   Family History   Problem Relation Age of Onset     Rheumatoid Arthritis Mother      Valvular heart disease Father      Heart Disease Maternal Grandmother      Heart Disease Brother      CABG Brother      Hyperthyroidism Sister      Heart Disease Sister      Clotting Disorder No family hx of        SOCIAL HISTORY:   Social History     Tobacco Use     Smoking status: Never Smoker     Smokeless tobacco: Never Used   Substance Use Topics     Alcohol use: Yes     Alcohol/week: 2.0 standard drinks      Surgical History  She  has a past surgical history that includes Mastectomy (Right, 1992); Breast surgery (Right, 1992); Phacoemulsification clear cornea with standard intraocular lens implant (Bilateral); and Replacement Total Knee (Left, 3/21/2019).    Social History  Reviewed, and she  reports that she has never smoked. She has never used smokeless tobacco. She reports current alcohol use of about 2.0 standard drinks of alcohol per week. She reports that she does not use drugs.  Smoking status reviewed.  Social history othrwise not contributory to HPI.  Allergies  No Known Allergies    Family History  Reviewed, and family history includes CABG in her brother; Heart Disease in her brother, maternal grandmother, and sister; Hyperthyroidism in her sister; Rheumatoid Arthritis in her mother; Valvular heart disease in her father.  Extended Emergency Contact Information  Primary Emergency Contact: Derek Menezes  Address: [address] [place] [address]  Mobile Phone:  "[phone]  Relation: Son  Secondary Emergency Contact: Lisa Carmona  Address: [address]           [place], [place]  Home Phone: [phone]  Relation: Daughter  Family history otherwise negative or not conributory to HPI.    Psychosocial Needs  Social History     Social History Narrative     Not on file     Additional psychosocial needs reviewed per nursing assessment.    Prior to Admission Medications  (Not in a hospital admission)      Review of Systems:  Pertinent items are noted in HPI.  Review of systems is negative except for HPI  Physical Exam:    BP Readings from Last 1 Encounters:   07/27/22 127/61     Pulse Readings from Last 1 Encounters:   07/27/22 58     Wt Readings from Last 1 Encounters:   07/27/22 71.9 kg (158 lb 8 oz)     Ht Readings from Last 1 Encounters:   05/12/22 1.613 m (5' 3.5\")     Estimated body mass index is 27.64 kg/m  as calculated from the following:    Height as of 5/12/22: 1.613 m (5' 3.5\").    Weight as of 7/27/22: 71.9 kg (158 lb 8 oz).    Head and neck without focal cranial neurologic defects.  JVD not distended.  Carotid upstroke normal without bruit.  Neck without cervical lymphadenopathy or thyromegaly.  Cardiac: S1-S2 distinct and regular no murmurs heard  Lungs: Clear    Cholesterol   Date Value Ref Range Status   05/12/2022 215 (H) <=199 mg/dL Final   03/24/2021 313 (H) <=199 mg/dL Final     Direct Measure HDL   Date Value Ref Range Status   05/12/2022 90 >=50 mg/dL Final     Comment:     HDL Cholesterol Reference Range:     0-2 years:   No reference ranges established for patients under 2 years old  at [facility] for lipid analytes.    2-8 years:  Greater than 45 mg/dL     18 years and older:   Female: Greater than or equal to 50 mg/dL   Male:   Greater than or equal to 40 mg/dL   03/24/2021 87 >=50 mg/dL Final     LDL Cholesterol Calculated   Date Value Ref Range Status   05/12/2022 113 <=129 mg/dL Final   03/24/2021 205 (H) <=129 mg/dL Final "     Triglycerides   Date Value Ref Range Status   05/12/2022 59 <=149 mg/dL Final   03/24/2021 104 <=149 mg/dL Final     No results found for: CHOLHDLRATIO Last Comprehensive Metabolic Panel:  Sodium   Date Value Ref Range Status   07/27/2022 143 136 - 145 mmol/L Final     Potassium   Date Value Ref Range Status   07/27/2022 4.5 3.4 - 5.3 mmol/L Final   05/12/2022 4.6 3.5 - 5.0 mmol/L Final     Chloride   Date Value Ref Range Status   07/27/2022 104 98 - 107 mmol/L Final   05/12/2022 105 98 - 107 mmol/L Final     Carbon Dioxide (CO2)   Date Value Ref Range Status   07/27/2022 29 22 - 29 mmol/L Final   05/12/2022 24 22 - 31 mmol/L Final     Anion Gap   Date Value Ref Range Status   07/27/2022 10 7 - 15 mmol/L Final   05/12/2022 12 5 - 18 mmol/L Final     Glucose   Date Value Ref Range Status   07/27/2022 84 70 - 99 mg/dL Final   05/12/2022 72 70 - 125 mg/dL Final     Urea Nitrogen   Date Value Ref Range Status   07/27/2022 17.8 8.0 - 23.0 mg/dL Final   05/12/2022 33 (H) 8 - 28 mg/dL Final     Creatinine   Date Value Ref Range Status   07/27/2022 1.13 (H) 0.51 - 0.95 mg/dL Final     GFR Estimate   Date Value Ref Range Status   07/27/2022 49 (L) >60 mL/min/1.73m2 Final     Comment:     Effective December 21, 2021 eGFRcr in adults is calculated using the 2021 CKD-EPI creatinine equation which includes age and gender (Amie et al., NEJM, DOI: 10.1056/OWLYnn0023311)   03/24/2021 50 (L) >60 mL/min/1.73m2 Final     GFR, ESTIMATED POCT   Date Value Ref Range Status   01/04/2022 57 (L) >60 mL/min/1.73m2 Final     Calcium   Date Value Ref Range Status   07/27/2022 10.5 (H) 8.8 - 10.2 mg/dL Final                               Statement Selected

## 2023-10-03 DIAGNOSIS — I10 ESSENTIAL HYPERTENSION: ICD-10-CM

## 2023-10-03 RX ORDER — LISINOPRIL 5 MG/1
5 TABLET ORAL DAILY
Qty: 90 TABLET | Refills: 3 | Status: SHIPPED | OUTPATIENT
Start: 2023-10-03 | End: 2024-05-20

## 2023-10-03 NOTE — TELEPHONE ENCOUNTER
Pending Prescriptions:                       Disp   Refills    lisinopril (ZESTRIL) 5 MG tablet          90 tab*3            Sig: Take 1 tablet (5 mg) by mouth daily

## 2023-10-11 DIAGNOSIS — E03.9 HYPOTHYROIDISM: ICD-10-CM

## 2023-10-11 RX ORDER — LEVOTHYROXINE SODIUM 25 UG/1
TABLET ORAL
Qty: 90 TABLET | Refills: 2 | Status: SHIPPED | OUTPATIENT
Start: 2023-10-11 | End: 2024-09-23

## 2023-10-11 NOTE — TELEPHONE ENCOUNTER
Prescription approved per [facility] Refill Protocol.     MONIQUE Roberts [facility]

## 2024-03-02 ENCOUNTER — HEALTH MAINTENANCE LETTER (OUTPATIENT)
Age: 84
End: 2024-03-02

## 2024-03-08 DIAGNOSIS — I49.3 FREQUENT UNIFOCAL PREMATURE VENTRICULAR CONTRACTIONS: ICD-10-CM

## 2024-03-08 DIAGNOSIS — I10 HTN (HYPERTENSION): Primary | ICD-10-CM

## 2024-03-08 RX ORDER — PROPAFENONE HYDROCHLORIDE 150 MG/1
150 TABLET, COATED ORAL 2 TIMES DAILY
Qty: 180 TABLET | Refills: 1 | Status: SHIPPED | OUTPATIENT
Start: 2024-03-08 | End: 2024-09-11

## 2024-03-08 NOTE — TELEPHONE ENCOUNTER
Noted pt last seen by (retired) Dr. Machuca 8-15-22 - left msg for pt informing her that appt needed w/ new cardiologist to obtain refills or they will be deferred to PCP - msg sent to sched for follow-up.  mg

## 2024-04-04 DIAGNOSIS — R94.39 ABNORMAL CARDIOVASCULAR STRESS TEST: ICD-10-CM

## 2024-04-04 RX ORDER — ATORVASTATIN CALCIUM 20 MG/1
20 TABLET, FILM COATED ORAL DAILY
Qty: 90 TABLET | Refills: 3 | Status: SHIPPED | OUTPATIENT
Start: 2024-04-04

## 2024-04-04 NOTE — TELEPHONE ENCOUNTER
Pt last seen by cardiology 8/15/22, is scheduled 7/08/24 - defer refill to PCP at this time.  -ral

## 2024-05-08 NOTE — PROGRESS NOTES
Assessment and Plan:     1. Medicare annual wellness visit, subsequent  At today's visit, we discussed lifestyle interventions to promote self-management and wellness, including maintenance of a healthy weight, healthy diet, regular physical activity and exercise, and falls prevention.  Discussed Shingrix, she will consider and will check insurance coverage.  Reviewed risks versus benefits of mammogram screening, indications for discontinuation, she will consider continuing screening.  Up-to-date with other routine health maintenance.  She has an advanced healthcare directive.    2. Breast cancer (H)  No suggestion of recurrence.  Will check conference of metabolic panel and hemogram.  I encouraged consideration of yearly mammogram.  - Comprehensive Metabolic Panel  - HM2(CBC w/o Differential)    3. Essential hypertension  Controlled on lisinopril at current dose.  Will check conference of metabolic panel today in monitoring.  Encouraged healthy lifestyle habits.    4. Frequent unifocal premature ventricular contractions  Stable and managed by cardiology on propafenone.    5. Hypercholesterolemia  Encouraged healthy lifestyle habits.  We discussed potential impact of omega-3 fatty acids on the various components of lipid panel.  Will check fasting lipid cascade today.  - Lipid Pimento FASTING    6. Acquired hypothyroidism  Clinically stable.  Will check thyroid cascade in monitoring of levothyroxine.  - Thyroid Cascade    7. Localized Osteoarthritis Of The Knee  Stable.  She continues regular exercise and will follow up with Dr. Robert as needed.    8. Sinus bradycardia  This remains asymptomatic.  Followed by cardiology.    9. Vitamin D deficiency  Advised daily supplement.  Will check vitamin D level today.  - Vitamin D, Total (25-Hydroxy)    10. Cataract  Likely will require future surgery.  Based on my visit today, I have determined that her risk of the anesthesia remains very low.  Further evaluation would not  be indicated.  She has not previously had difficulties with anesthesia.  This visit today would serve as a preoperative assessment, and I approve her for surgery and anesthesia.    11. Skin lesion of face  May be actinic changes, there could be a component of squamous cell carcinoma, therefore referral is made to dermatology.  - Ambulatory referral to Dermatology     The patient's current medical problems were reviewed.    The following are part of a depression follow up plan for the patient:  mental health care assessment  The following health maintenance schedule was reviewed with the patient and provided in printed form in the after visit summary:   Health Maintenance   Topic Date Due     FALL RISK ASSESSMENT  06/19/2018     INFLUENZA VACCINE RULE BASED (Season Ended) 08/01/2018     DXA SCAN  07/07/2019     ADVANCE DIRECTIVES DISCUSSED WITH PATIENT  06/19/2022     COLONOSCOPY  08/31/2022     TD 18+ HE  05/30/2023     PNEUMOCOCCAL POLYSACCHARIDE VACCINE AGE 65 AND OVER  Completed     PNEUMOCOCCAL CONJUGATE VACCINE FOR ADULTS (PCV13 OR PREVNAR)  Completed     ZOSTER VACCINE  Completed        Subjective:   Chief Complaint: Alexandra Menezes is an 77 y.o. female here for an Annual Wellness visit.   HPI: She is been doing quite well over the past year.  History of breast cancer, has not had any evidence of recurrence and is doing quite well.  Continues to struggle with left knee osteoarthritis, followed by Dr. Robert, notes a flare after recent trip to [facility] but seems to be getting it under better control.  She continues to do water aerobics regularly with good effect.  Start on lisinopril for hypertension and is tolerating well without side effects.  Rarely will feel slightly lightheaded but nothing ongoing.  She remains on propafenone under the direction of Dr. Smith for premature ventricular contractions, remains asymptomatic.  She has a history of hyperlipidemia, does not take any medications specific to  this but is currently taking an omega-3 fatty acid supplement which she otherwise tolerates well.  She remains on levothyroxine for hypothyroidism and remains clinically stable on.  History of vitamin D deficiency, she is taking vitamin D supplement daily.  She is wondering if she needs to take her calcium, vitamin D, omega-3, and multivitamin supplements.    She notes that she was recently preparing for vacation, finds that this altered her response to some of our screening depression and anxiety tests, we discussed signs and symptoms of depression and I also provide information regarding this to her today.    Her last mammogram was 9/20/2016.  She had a bone density scan 7/7/2017 that was normal.  She is no longer needing colonoscopy or Pap smears.    Review of Systems: She is noting skin lesions on the left side of her face, one above her eyebrow that is reddened and flat but stings, and a few on her left cheek that are raised and sometimes will peel away as if a scab.  Please see above.  The rest of the review of systems are negative for all systems.    Patient Care Team:  Donna Buchanan MD as PCP - General (Family Medicine)  Aakash Smith MD as Physician (Cardiology)  Fede Robert MD as Physician (Orthopedic Surgery)     Patient Active Problem List   Diagnosis     Sinus bradycardia     Hypercholesterolemia     Hypothyroidism     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     Localized Osteoarthritis Of The Knee     Breast cancer (H)     Frequent unifocal premature ventricular contractions     Essential hypertension     Vitamin D deficiency     Past Medical History:   Diagnosis Date     Arrhythmia     take propafenone     Hypertension      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     Social History Main Topics     Smoking status: Never Smoker     Smokeless tobacco: Never Used     Alcohol use 1.2 oz/week     2 Glasses of wine per week     Drug use: No     Sexual activity: No     Other Topics Concern     Not on file     Social History Narrative      Current Outpatient Prescriptions   Medication Sig Dispense Refill     betamethasone dipropionate (DIPROLENE) 0.05 % cream Apply to affected area sparingly twice daily (Patient taking differently: As needed) 45 g 0     CALCIUM CARBONATE (CALCIUM 600 ORAL) Take 1 capsule by mouth 2 (two) times a day.       cholecalciferol, vitamin D3, 1,000 unit tablet Take 1,000 Units by mouth daily.       levothyroxine (SYNTHROID, LEVOTHROID) 25 MCG tablet TAKE ONE TABLET BY MOUTH ONE TIME DAILY 90 tablet 0     lisinopril (PRINIVIL,ZESTRIL) 10 MG tablet Take 1 tablet (10 mg total) by mouth daily. 90 tablet 1     MULTIVIT &MINERALS/FERROUS FUM (MULTI VITAMIN ORAL) Take 1 tablet by mouth daily.       omega-3 fatty acids-vitamin E (FISH OIL) 1,000 mg cap Take by mouth daily.       propafenone (RYTHMOL) 225 MG tablet TAKE 1 TABLET BY MOUTH 3 TIMES A DAY. 270 tablet 2     No current facility-administered medications for this visit.       Objective:   Vital Signs:   Visit Vitals     /74 (Patient Site: Right Arm, Patient Position: Sitting, Cuff Size: Adult Large)     Pulse (!) 50     Temp 97.7  F (36.5  C) (Oral)     Resp 20     Ht 5' 4\" (1.626 m)     Wt 155 lb 6.4 oz (70.5 kg)     SpO2 98%     BMI 26.67 kg/m2        VisionScreening:  No exam data present     PHYSICAL EXAM  Physical Examination: General appearance - alert, well appearing, and in no " distress, oriented to person, place, and time and normal appearing weight  Mental status - alert, oriented to person, place, and time, normal mood, behavior, speech, dress, motor activity, and thought processes  Eyes - pupils equal and reactive, extraocular eye movements intact  Ears - bilateral TM's and external ear canals normal  Nose - normal and patent, no erythema, discharge or polyps  Mouth - mucous membranes moist, pharynx normal without lesions  Neck - supple, no significant adenopathy  Lymphatics - no palpable lymphadenopathy, no hepatosplenomegaly  Chest - clear to auscultation, no wheezes, rales or rhonchi, symmetric air entry  Heart - normal rate, regular rhythm, normal S1, S2, no murmurs, rubs, clicks or gallops  Abdomen - soft, nontender, nondistended, no masses or organomegaly  Breasts - left breast appear normal, no suspicious masses, no skin or nipple changes or axillary nodes, post-mastectomy site well healed and free of suspicious changes  Neurological - alert, oriented, normal speech, no focal findings or movement disorder noted  Musculoskeletal - no joint tenderness, deformity or swelling  Extremities - peripheral pulses normal, no pedal edema, no clubbing or cyanosis  Skin - normal coloration and turgor, no rashes, no suspicious skin lesions noted     Assessment Results 6/28/2018   Activities of Daily Living No help needed   Instrumental Activities of Daily Living No help needed   Some recent data might be hidden     A Mini-Cog score of 0-2 suggests the possibility of dementia, score of 3-5 suggests no dementia    Identified Health Risks:     The patient's PHQ-9 score is consistent with mild depression. She was provided with information regarding depression,, however it seems that much of her answers were related to the stresses of recent vacation and she feels that overall she does not relate to the diagnosis of depression.  Will follow up as needed.    Patient's advanced directive was  discussed and I am comfortable with the patient's wishes.         ROS: denies HA, weakness, dizziness, fevers/chills, nausea/vomiting, chest pain, SOB, diaphoresis, abdominal pain, diarrhea, joint pain, neuro deficits, dysuria/hematuria, rash    +rectal bleeding [Follow-Up: _____] : a [unfilled] follow-up visit

## 2024-05-14 SDOH — HEALTH STABILITY: PHYSICAL HEALTH: ON AVERAGE, HOW MANY MINUTES DO YOU ENGAGE IN EXERCISE AT THIS LEVEL?: 40 MIN

## 2024-05-14 SDOH — HEALTH STABILITY: PHYSICAL HEALTH: ON AVERAGE, HOW MANY DAYS PER WEEK DO YOU ENGAGE IN MODERATE TO STRENUOUS EXERCISE (LIKE A BRISK WALK)?: 5 DAYS

## 2024-05-14 ASSESSMENT — SOCIAL DETERMINANTS OF HEALTH (SDOH): HOW OFTEN DO YOU GET TOGETHER WITH FRIENDS OR RELATIVES?: MORE THAN THREE TIMES A WEEK

## 2024-05-20 ENCOUNTER — OFFICE VISIT (OUTPATIENT)
Dept: FAMILY MEDICINE | Facility: CLINIC | Age: 84
End: 2024-05-20
Payer: COMMERCIAL

## 2024-05-20 VITALS
DIASTOLIC BLOOD PRESSURE: 69 MMHG | TEMPERATURE: 97.3 F | HEIGHT: 63 IN | BODY MASS INDEX: 28.1 KG/M2 | OXYGEN SATURATION: 98 % | SYSTOLIC BLOOD PRESSURE: 196 MMHG | HEART RATE: 48 BPM | WEIGHT: 158.6 LBS

## 2024-05-20 DIAGNOSIS — G89.29 CHRONIC BILATERAL LOW BACK PAIN WITH LEFT-SIDED SCIATICA: ICD-10-CM

## 2024-05-20 DIAGNOSIS — Z13.1 SCREENING FOR DIABETES MELLITUS: ICD-10-CM

## 2024-05-20 DIAGNOSIS — E78.00 PURE HYPERCHOLESTEROLEMIA: ICD-10-CM

## 2024-05-20 DIAGNOSIS — M85.80 OSTEOPENIA, UNSPECIFIED LOCATION: ICD-10-CM

## 2024-05-20 DIAGNOSIS — I48.91 ATRIAL FIBRILLATION, UNSPECIFIED TYPE (H): ICD-10-CM

## 2024-05-20 DIAGNOSIS — Z12.31 VISIT FOR SCREENING MAMMOGRAM: ICD-10-CM

## 2024-05-20 DIAGNOSIS — M25.552 HIP PAIN, LEFT: ICD-10-CM

## 2024-05-20 DIAGNOSIS — R20.2 PARESTHESIAS: ICD-10-CM

## 2024-05-20 DIAGNOSIS — E55.9 VITAMIN D DEFICIENCY: ICD-10-CM

## 2024-05-20 DIAGNOSIS — M54.42 CHRONIC BILATERAL LOW BACK PAIN WITH LEFT-SIDED SCIATICA: ICD-10-CM

## 2024-05-20 DIAGNOSIS — Z78.0 ASYMPTOMATIC POSTMENOPAUSAL STATUS: ICD-10-CM

## 2024-05-20 DIAGNOSIS — E03.9 HYPOTHYROIDISM, UNSPECIFIED TYPE: ICD-10-CM

## 2024-05-20 DIAGNOSIS — N18.31 STAGE 3A CHRONIC KIDNEY DISEASE (H): Chronic | ICD-10-CM

## 2024-05-20 DIAGNOSIS — Z00.00 MEDICARE ANNUAL WELLNESS VISIT, SUBSEQUENT: Primary | ICD-10-CM

## 2024-05-20 DIAGNOSIS — I25.10 CORONARY ARTERY DISEASE INVOLVING NATIVE CORONARY ARTERY OF NATIVE HEART WITHOUT ANGINA PECTORIS: ICD-10-CM

## 2024-05-20 DIAGNOSIS — I10 ESSENTIAL HYPERTENSION: ICD-10-CM

## 2024-05-20 DIAGNOSIS — R79.89 ELEVATED PARATHYROID HORMONE: ICD-10-CM

## 2024-05-20 LAB
HBA1C MFR BLD: 4.9 % (ref 0–5.6)
HGB BLD-MCNC: 13.6 G/DL (ref 11.7–15.7)

## 2024-05-20 PROCEDURE — 82607 VITAMIN B-12: CPT | Performed by: FAMILY MEDICINE

## 2024-05-20 PROCEDURE — 82043 UR ALBUMIN QUANTITATIVE: CPT | Performed by: FAMILY MEDICINE

## 2024-05-20 PROCEDURE — 84443 ASSAY THYROID STIM HORMONE: CPT | Performed by: FAMILY MEDICINE

## 2024-05-20 PROCEDURE — 36415 COLL VENOUS BLD VENIPUNCTURE: CPT | Performed by: FAMILY MEDICINE

## 2024-05-20 PROCEDURE — 85018 HEMOGLOBIN: CPT | Performed by: FAMILY MEDICINE

## 2024-05-20 PROCEDURE — 80053 COMPREHEN METABOLIC PANEL: CPT | Performed by: FAMILY MEDICINE

## 2024-05-20 PROCEDURE — 82570 ASSAY OF URINE CREATININE: CPT | Performed by: FAMILY MEDICINE

## 2024-05-20 PROCEDURE — 80061 LIPID PANEL: CPT | Performed by: FAMILY MEDICINE

## 2024-05-20 PROCEDURE — 83970 ASSAY OF PARATHORMONE: CPT | Performed by: FAMILY MEDICINE

## 2024-05-20 PROCEDURE — G0439 PPPS, SUBSEQ VISIT: HCPCS | Performed by: FAMILY MEDICINE

## 2024-05-20 PROCEDURE — 99214 OFFICE O/P EST MOD 30 MIN: CPT | Mod: 25 | Performed by: FAMILY MEDICINE

## 2024-05-20 PROCEDURE — 83036 HEMOGLOBIN GLYCOSYLATED A1C: CPT | Performed by: FAMILY MEDICINE

## 2024-05-20 PROCEDURE — 82306 VITAMIN D 25 HYDROXY: CPT | Performed by: FAMILY MEDICINE

## 2024-05-20 RX ORDER — TIMOLOL MALEATE 2.5 MG/ML
1 SOLUTION/ DROPS OPHTHALMIC 2 TIMES DAILY
COMMUNITY

## 2024-05-20 RX ORDER — LISINOPRIL 10 MG/1
10 TABLET ORAL DAILY
Qty: 90 TABLET | Refills: 1 | Status: SHIPPED | OUTPATIENT
Start: 2024-05-20 | End: 2024-06-20

## 2024-05-20 RX ORDER — RESPIRATORY SYNCYTIAL VIRUS VACCINE 120MCG/0.5
0.5 KIT INTRAMUSCULAR ONCE
Qty: 1 EACH | Refills: 0 | Status: CANCELLED | OUTPATIENT
Start: 2024-05-20 | End: 2024-05-20

## 2024-05-20 ASSESSMENT — PAIN SCALES - GENERAL: PAINLEVEL: NO PAIN (0)

## 2024-05-20 NOTE — PROGRESS NOTES
Preventive Care Visit  [facility]  Donna Buchanan MD, Family Medicine  May 20, 2024      Assessment & Plan     Medicare annual wellness visit, subsequent  Discussed that we do not have record of second Shingrix, Tdap, COVID booster, she will check with pharmacy to see if this still needs to be administered.  Order placed for screening mammogram due to history of breast cancer.  Order placed for follow-up bone density scan.  At today's visit, we discussed lifestyle interventions to promote self-management and wellness, including maintenance of a healthy weight, healthy diet, regular physical activity and exercise, and falls prevention.     Essential hypertension  Inadequate control, significant elevation in blood pressure today is asymptomatic without evidence of cerebrovascular or cardiovascular effects.  She will begin checking her home blood pressures.  Increase lisinopril to 10 mg daily.  Follow-up blood pressure check in 1 week with medical assistant, will plan to titrate lisinopril pending that result.  Will check renal function and thyroid today.  - lisinopril (ZESTRIL) 10 MG tablet; Take 1 tablet (10 mg) by mouth daily    Atrial fibrillation, unspecified type (H)  This is been rhythm controlled through use of propafenone under the direction of cardiology.  Per cardiology, has not required anticoagulation.    Stage 3a chronic kidney disease (H)  Chronic and overall stable.  Will recheck kidney function, will check for complications including microalbuminuria, anemia, hyperparathyroidism, vitamin D deficiency.  - Comprehensive metabolic panel; Future  - Albumin Random Urine Quantitative with Creat Ratio; Future  - Hemoglobin; Future  - Parathyroid Hormone Intact; Future  - Vitamin D Deficiency; Future  - Hemoglobin A1c; Future    Hypothyroidism, unspecified type  This has been stable and controlled on low-dose levothyroxine.  In the past we have considered discontinuation of  "levothyroxine but for now have elected to continue same.  Will check TSH today.  - TSH with free T4 reflex; Future    Hypercholesterolemia  She remains on atorvastatin daily.  Continue.  We will check fasting lipids and liver function today.  - Comprehensive metabolic panel; Future  - Lipid panel reflex to direct LDL Fasting; Future    Elevated parathyroid hormone  Will reassess PTH level today along with electrolytes.    Coronary artery disease involving native coronary artery of native heart without angina pectoris  This remains asymptomatic.  Continue atorvastatin.    Vitamin D deficiency  We will check vitamin D level today.    Osteopenia, unspecified location  Order placed for follow-up bone density scan.  - DX Bone Density; Future    Visit for screening mammogram  - MA SCREENING DIGITAL BILAT - Future  (s+30); Future    Hip pain, left  Mildly reduced range of motion.  Suspect osteoarthritis.  Referral placed orthopedics for further evaluation and imaging.    Paresthesias affecting lower extremities symmetrically bilaterally  Reviewed differential diagnosis.  This has been persisting.  No weakness noted.  Will check for hypothyroidism, diabetes, or vitamin B12 deficiency contributing.  If unremarkable, would have low threshold for lumbar spine imaging.  - TSH with free T4 reflex; Future  - Hemoglobin A1c; Future  - Vitamin B12; Future    Chronic bilateral low back pain with left-sided sciatica  I am suspicious of spinal stenosis, could also have some disc disease with bilateral radiculopathy.  As above.    Screening for diabetes mellitus  - Hemoglobin A1c; Future    Asymptomatic postmenopausal status  - DX Bone Density; Future            BMI  Estimated body mass index is 27.68 kg/m  as calculated from the following:    Height as of this encounter: 1.612 m (5' 3.47\").    Weight as of this encounter: 71.9 kg (158 lb 9.6 oz).       Counseling  Appropriate preventive services were discussed with this patient, " including applicable screening as appropriate for fall prevention, nutrition, physical activity, Tobacco-use cessation, weight loss and cognition.  Checklist reviewing preventive services available has been given to the patient.  Reviewed patient's diet, addressing concerns and/or questions.   She is at risk for psychosocial distress and has been provided with information to reduce risk.   Patient reported safety concerns were addressed today.        Wen Morris is a 83 year old, presenting for the following:  Physical (AWV- stage 3 kidney disease / heart disease/ left hip joint - right foot toes / more spots on skin - fasting)        5/20/2024     2:05 PM   Additional Questions   Roomed by [facility] Directive  Patient does not have a Health Care Directive or Living Will: Advance Directive received and scanned. Click on Code in the patient header to view.    ANNAMARIA  Seen in clinic today for routine preventive care visit.  She has several concerns today.  Noting that she has had some left hip joint pain in the left inguinal region, doing okay with swimming but seems to worsen if she pushes herself too far.  She had to take ibuprofen for it this morning.  Times feels like she can collapse if she turns quickly.  She is also had some paresthesias beginning in her knees bilaterally and going all the way down into her toes throughout her lower extremities.  No injury.  She has some chronic low back pain, history of left lumbar radiculopathy, states this is different.  Continues to have low back pain.  She notes some spots on her skin she like me to look at.  She notes 2 episodes in the past 4 days where she had brief left sharp chest pain that resolved after 2 minutes, occurred at rest.  Feels similar to muscle spasm that she has had there in the past.  No exertional symptoms.    History of nonobstructive coronary artery disease treated with statin and aspirin, no history of angina.  History of atrial  fibrillation that resulted in hospitalization, has been maintained on propafenone under the direction of cardiology, due for follow-up.  She has chronic kidney disease stage III that is due for follow-up.  History of hypertension managed with lisinopril, significant elevation noted in blood pressure today but she does not check blood pressures at home.    Denies lightheadedness, dizziness, headaches, chest pain, dyspnea, or swelling.  Struve hypothyroidism, she has been on a very low-dose of levothyroxine to me and consider discontinuation of the levothyroxine last year but acted against it.  Due for follow-up.  History of hyperparathyroidism, osteopenia due for follow-up.  History of vitamin D deficiency due for follow-up.  She received vaccines at her pharmacy in the last year, believes she has received her tetanus booster and COVID booster but she is not certain.              5/14/2024   General Health   How would you rate your overall physical health? Good   Feel stress (tense, anxious, or unable to sleep) Only a little   (!) STRESS CONCERN      5/14/2024   Nutrition   Diet: Regular (no restrictions)         5/14/2024   Exercise   Days per week of moderate/strenous exercise 5 days   Average minutes spent exercising at this level 40 min         5/14/2024   Social Factors   Frequency of gathering with friends or relatives More than three times a week   Worry food won't last until get money to buy more No   Food not last or not have enough money for food? No   Do you have housing?  Yes   Are you worried about losing your housing? No   Lack of transportation? No   Unable to get utilities (heat,electricity)? No         5/20/2024   Fall Risk   Fallen 2 or more times in the past year? No   Trouble with walking or balance? No          5/14/2024   Activities of Daily Living- Home Safety   Needs help with the following daily activites None of the above   Safety concerns in the home No grab bars in the bathroom          5/14/2024   Dental   Dentist two times every year? Yes         5/14/2024   Hearing Screening   Hearing concerns? None of the above         5/14/2024   Driving Risk Screening   Patient/family members have concerns about driving No         5/14/2024   General Alertness/Fatigue Screening   Have you been more tired than usual lately? No         5/14/2024   Urinary Incontinence Screening   Bothered by leaking urine in past 6 months No         5/14/2024   TB Screening   Were you born outside of the [place]? No         Today's PHQ-2 Score:       5/20/2024     2:05 PM   PHQ-2 ( 1999 Pf[employer])   Q1: Little interest or pleasure in doing things 0   Q2: Feeling down, depressed or hopeless 0   PHQ-2 Score 0   Q1: Little interest or pleasure in doing things Not at all   Q2: Feeling down, depressed or hopeless Not at all   PHQ-2 Score 0           5/14/2024   Substance Use   Alcohol more than 3/day or more than 7/wk No   Do you have a current opioid prescription? No   How severe/bad is pain from 1 to 10? 2/10   Do you use any other substances recreationally? No     Social History     Tobacco Use    Smoking status: Never    Smokeless tobacco: Never   Vaping Use    Vaping status: Never Used   Substance Use Topics    Alcohol use: Yes     Alcohol/week: 2.0 standard drinks of alcohol     Comment: occ    Drug use: No           11/14/2022   LAST FHS-7 RESULTS   1st degree relative breast or ovarian cancer No    No   Any relative bilateral breast cancer No    No   Any male have breast cancer No    No   Any ONE woman have BOTH breast AND ovarian cancer No    No   Any woman with breast cancer before 50yrs No    No   2 or more relatives with breast AND/OR ovarian cancer No    No   2 or more relatives with breast AND/OR bowel cancer No    No        Mammogram Screening - After age 74- determine frequency with patient based on health status, life expectancy and patient goals              Reviewed and updated as needed this visit by Provider                     Past Medical History:   Diagnosis Date    Arrhythmia     take propafenone    Arthritis     Breast cancer (H)     right Mastectomy    CKD (chronic kidney disease) stage 3, GFR 30-59 ml/min (H) 3/21/2019    Disease of thyroid gland     hypothyroid    Hypercholesterolemia     Hypertension     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    Sinus bradycardia     frequent PVC's    Squamous cell cancer of skin of hand 2015     Past Surgical History:   Procedure Laterality Date    BREAST SURGERY Right     Mastectomy for Ca    MASTECTOMY Right     PHACOEMULSIFICATION CLEAR CORNEA WITH STANDARD INTRAOCULAR LENS IMPLANT Bilateral     REPLACEMENT TOTAL KNEE Left 3/21/2019    Procedure: LEFT TOTAL KNEE ARTHROPLASTY,COMPUTER-ASSIST;  Surgeon: Fede Robert MD;  Location: [facility] OR;  Service: Orthopedics     OB History    Para Term  AB Living   2 2 2 0 0 2   SAB IAB Ectopic Multiple Live Births   0 0 0 0 2      # Outcome Date GA Lbr Dileep/2nd Weight Sex Type Anes PTL Lv   2 Term            1 Term              Lab work is in process  Labs reviewed in EPIC  BP Readings from Last 3 Encounters:   24 (!) 196/69   23 128/76   23 139/76    Wt Readings from Last 3 Encounters:   24 71.9 kg (158 lb 9.6 oz)   23 73.1 kg (161 lb 1.6 oz)   23 73.2 kg (161 lb 4.8 oz)                  Patient Active Problem List   Diagnosis    Sinus bradycardia    Hypercholesterolemia    Hypothyroidism    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    Localized Osteoarthritis Of The Knee    History of breast cancer    Frequent unifocal premature ventricular contractions    Essential hypertension    Vitamin D deficiency    CKD (chronic kidney disease) stage 3, GFR 30-59 ml/min (H)    Coronary artery disease involving native coronary artery without angina pectoris    Elevated parathyroid hormone    Atrial fibrillation (H)    Osteopenia, unspecified  location     Past Surgical History:   Procedure Laterality Date    BREAST SURGERY Right 1992    Mastectomy for Ca    MASTECTOMY Right 1992    PHACOEMULSIFICATION CLEAR CORNEA WITH STANDARD INTRAOCULAR LENS IMPLANT Bilateral     REPLACEMENT TOTAL KNEE Left 3/21/2019    Procedure: LEFT TOTAL KNEE ARTHROPLASTY,COMPUTER-ASSIST;  Surgeon: Fede Robert MD;  Location: [facility] OR;  Service: Orthopedics       Social History     Tobacco Use    Smoking status: Never    Smokeless tobacco: Never   Substance Use Topics    Alcohol use: Yes     Alcohol/week: 2.0 standard drinks of alcohol     Comment: occ     Family History   Problem Relation Age of Onset    Rheumatoid Arthritis Mother     Valvular heart disease Father     Heart Disease Maternal Grandmother     Heart Disease Brother     CABG Brother     Hyperthyroidism Sister     Heart Disease Sister     Clotting Disorder No family hx of          Current Outpatient Medications   Medication Sig Dispense Refill    atorvastatin (LIPITOR) 20 MG tablet TAKE 1 TABLET BY MOUTH EVERY DAY 90 tablet 3    cholecalciferol 50 MCG (2000 UT) tablet Take 2,000 Units by mouth At Bedtime      ketorolac (ACULAR) 0.5 % ophthalmic solution PUT 1 DROP IN LEFT EYE 4 TIMES A DAY      latanoprost (XALATAN) 0.005 % ophthalmic solution Place 1 drop into both eyes At Bedtime   3    levothyroxine (SYNTHROID/LEVOTHROID) 25 MCG tablet TAKE 1 TABLET BY MOUTH DAILY AT 6 AM 90 tablet 2    lisinopril (ZESTRIL) 10 MG tablet Take 1 tablet (10 mg) by mouth daily 90 tablet 1    omega-3 fatty acids-fish oil (OMEGA 3 FISH OIL) 684-1,200 mg CpDR [OMEGA-3 FATTY ACIDS-FISH OIL (OMEGA 3 FISH OIL) 684-1,200 MG CPDR] Take 2 tablets by mouth every morning.      prednisoLONE acetate (PRED FORTE) 1 % ophthalmic suspension Place 1 drop Into the left eye 4 times daily 10 mL 1    propafenone (RYTHMOL) 150 MG TABS tablet TAKE 1 TABLET BY MOUTH TWICE A  tablet 1    timolol maleate (TIMOPTIC) 0.25 % ophthalmic  solution 1 drop 2 times daily       No Known Allergies  Recent Labs   Lab Test 05/17/23  1421 03/14/23  0749 07/27/22  1103 05/12/22  0935 05/12/22  0935 09/03/21  1708 03/24/21  1101 06/30/20  1140   LDL 86  --   --   --  113  --  205* 211*   HDL 98  --   --   --  90  --  87 83   TRIG 72  --   --   --  59  --  104 77   ALT 24  --   --   --  22  --  19 21   CR 1.02*  --  1.13*  --  1.26*   < > 1.07 1.20*   GFRESTIMATED 55*  --  49*  --  43*   < > 50* 44*   GFRESTBLACK  --   --   --   --   --   --  60* 53*   POTASSIUM 4.4 4.5 4.5   < > 4.6   < > 4.6 5.0   TSH 2.70  --  2.95  --  2.25  --  3.15 2.36    < > = values in this interval not displayed.      Current providers sharing in care for this patient include:  Patient Care Team:  Donna Buchanan MD as PCP - General  Donna Buchanan MD as Assigned PCP  Crow Machuca MD as MD (Cardiovascular Disease)  Adan Borja MD as MD (Ophthalmology)  Branden Escalona MD as MD (Cardiovascular Disease)    The following health maintenance items are reviewed in Epic and correct as of today:  Health Maintenance   Topic Date Due    RSV VACCINE (Pregnancy & 60+) (1 - 1-dose 60+ series) Never done    IPV IMMUNIZATION (2 of 3 - Adult catch-up series) 05/23/2006    DTAP/TDAP/TD IMMUNIZATION (2 - Td or Tdap) 05/30/2023    MAMMO SCREENING  11/14/2023    COVID-19 Vaccine (8 - 2023-24 season) 02/15/2024    BMP  05/17/2024    CMP  05/17/2024    LIPID  05/17/2024    MICROALBUMIN  05/17/2024    ANNUAL REVIEW OF HM ORDERS  05/17/2024    ZOSTER IMMUNIZATION (3 of 3) 11/07/2023    MEDICARE ANNUAL WELLNESS VISIT  05/17/2024    TSH W/FREE T4 REFLEX  05/17/2024    HEMOGLOBIN  05/17/2024    FALL RISK ASSESSMENT  05/20/2025    DEXA  06/20/2027    ADVANCE CARE PLANNING  05/17/2028    PHQ-2 (once per calendar year)  Completed    INFLUENZA VACCINE  Completed    Pneumococcal Vaccine: 65+ Years  Completed    URINALYSIS  Completed    HPV IMMUNIZATION  Aged Out    MENINGITIS  "IMMUNIZATION  Aged Out    RSV MONOCLONAL ANTIBODY  Aged Out         Review of Systems  Constitutional, neuro, ENT, endocrine, pulmonary, cardiac, gastrointestinal, genitourinary, musculoskeletal, integument and psychiatric systems are negative, except as otherwise noted.     Objective    Exam  BP (!) 186/65 (BP Location: Right arm, Patient Position: Sitting, Cuff Size: Adult Regular)   Pulse (!) 49   Temp 97.3  F (36.3  C) (Oral)   Ht 1.612 m (5' 3.47\")   Wt 71.9 kg (158 lb 9.6 oz)   SpO2 (!) 85%   BMI 27.68 kg/m     Estimated body mass index is 27.68 kg/m  as calculated from the following:    Height as of this encounter: 1.612 m (5' 3.47\").    Weight as of this encounter: 71.9 kg (158 lb 9.6 oz).    Physical Exam  GENERAL: alert and no distress  EYES: Eyes grossly normal to inspection, PERRL and conjunctivae and sclerae normal  HENT: ear canals and TM's normal, nose and mouth without ulcers or lesions  NECK: no adenopathy, no asymmetry, masses, or scars  RESP: lungs clear to auscultation - no rales, rhonchi or wheezes  CV: regular rate and rhythm, normal S1 S2, no S3 or S4, no murmur, click or rub, no peripheral edema  ABDOMEN: soft, nontender, no hepatosplenomegaly, no masses and bowel sounds normal  MS: no gross musculoskeletal defects noted, no edema; mildly reduced range of motion to internal and external rotation left hip, no pain.  Tenderness to palpation at the left SI joint, no midline spinal tenderness or muscle spasm noted.  SKIN: no suspicious lesions or rashes; benign seborrheic keratoses noted on extremities.  NEURO: Normal strength and tone, mentation intact and speech normal  PSYCH: mentation appears normal, affect normal/bright  Patient declines breast exam        5/20/2024   Mini Cog   Clock Draw Score 2 Normal   3 Item Recall 3 objects recalled   Mini Cog Total Score 5         Vision Screen  Reason Vision Screen Not Completed: Patient had exam in last 12 months      Signed Electronically by: " Donna Buchanan MD

## 2024-05-20 NOTE — PATIENT INSTRUCTIONS
"For high blood pressure, increase lisinopril to 10 mg daily.  Return for nurse only blood pressure check in 1 week, bring your home blood pressure cuff with you.  Check your blood pressure intermittently between now and then.    Schedule a visit with orthopedics regarding your left hip pain.    For the numbness in your legs, we are checking your thyroid, vitamin B12 level, and blood sugar today.  If all is normal, we should consider imaging of your low back.    You may continue to use toe wraps to help manage your toe misalignment.  If it is causing more symptoms and this is no longer working, we can consider having you see dietary.    Schedule your routine cardiology visit with Dr. Escalona.    I placed an order for your bone density scan and your mammogram.    We do not have a record of your second Shingrix vaccine or your Tdap.  Check with your pharmacy to see if these have  been administered.  If you have not had a COVID booster since October, you could consider receiving an additional COVID booster.  Preventive Care Advice   This is general advice we often give to help people stay healthy. Your care team may have specific advice just for you. Please talk to your care team about your own preventive care needs.  Lifestyle  Exercise at least 150 minutes each week (30 minutes a day, 5 days a week).  Do muscle strengthening activities 2 days a week. These help control your weight and prevent disease.  No smoking.  Wear sunscreen to prevent skin cancer.  Have your home tested for radon every 2 to 5 years. Radon is a colorless, odorless gas that can harm your lungs. To learn more, go to www.health.[facility].mn.us and search for \"Radon in Homes.\"  Keep guns unloaded and locked up in a safe place like a safe or gun vault, or, use a gun lock and hide the keys. Always lock away bullets separately. To learn more, visit [place].gov and search for \"safe gun storage.\"  Nutrition  Eat 5 or more servings of fruits and vegetables " each day.  Try wheat bread, brown rice and whole grain pasta (instead of white bread, rice, and pasta).  Get enough calcium and vitamin D. Check the label on foods and aim for 100% of the RDA (recommended daily allowance).  Regular exams  Have a dental exam and cleaning every 6 months.  See your health care team every year to talk about:  Any changes in your health.  Any medicines your care team has prescribed.  Preventive care, family planning, and ways to prevent chronic diseases.  Shots (vaccines)   HPV shots (up to age 26), if you've never had them before.  Hepatitis B shots (up to age 59), if you've never had them before.  COVID-19 shot: Get this shot when it's due.  Flu shot: Get a flu shot every year.  Tetanus shot: Get a tetanus shot every 10 years.  Pneumococcal, hepatitis A, and RSV shots: Ask your care team if you need these based on your risk.  Shingles shot (for age 50 and up).  General health tests  Diabetes screening:  Starting at age 35, Get screened for diabetes at least every 3 years.  If you are younger than age 35, ask your care team if you should be screened for diabetes.  Cholesterol test: At age 39, start having a cholesterol test every 5 years, or more often if advised.  Bone density scan (DEXA): At age 50, ask your care team if you should have this scan for osteoporosis (brittle bones).  Hepatitis C: Get tested at least once in your life.  Abdominal aortic aneurysm screening: Talk to your doctor about having this screening if you:  Have ever smoked; and  Are biologically male; and  Are between the ages of 65 and 75.  STIs (sexually transmitted infections)  Before age 24: Ask your care team if you should be screened for STIs.  After age 24: Get screened for STIs if you're at risk. You are at risk for STIs (including HIV) if:  You are sexually active with more than one person.  You don't use condoms every time.  You or a partner was diagnosed with a sexually transmitted infection.  If you are  at risk for HIV, ask about PrEP medicine to prevent HIV.  Get tested for HIV at least once in your life, whether you are at risk for HIV or not.  Cancer screening tests  Cervical cancer screening: If you have a cervix, begin getting regular cervical cancer screening tests at age 21. Most people who have regular screenings with normal results can stop after age 65. Talk about this with your provider.  Breast cancer scan (mammogram): If you've ever had breasts, begin having regular mammograms starting at age 40. This is a scan to check for breast cancer.  Colon cancer screening: It is important to start screening for colon cancer at age 45.  Have a colonoscopy test every 10 years (or more often if you're at risk) Or, ask your provider about stool tests like a FIT test every year or Cologuard test every 3 years.  To learn more about your testing options, visit: www.GELI/657432.pdf.  For help making a decision, visit: lisa/aq87587.  Prostate cancer screening test: If you have a prostate and are age 55 to 69, ask your provider if you would benefit from a yearly prostate cancer screening test.  Lung cancer screening: If you are a current or former smoker age 50 to 80, ask your care team if ongoing lung cancer screenings are right for you.  For informational purposes only. Not to replace the advice of your health care provider. Copyright   2023 [facility]. All rights reserved. Clinically reviewed by the [facility] Transitions Program. [employer] 209325 - REV 04/24.    Learning About Activities of Daily Living  What are activities of daily living?     Activities of daily living (ADLs) are the basic self-care tasks you do every day. These include eating, bathing, dressing, and moving around.  As you age, and if you have health problems, you may find that it's harder to do some of these tasks. If so, your doctor can suggest ideas that may help.  To measure what kind of help you may need, your  doctor will ask how well you are able to do ADLs. Let your doctor know if there are any tasks that you are having trouble doing. This is an important first step to getting help. And when you have the help you need, you can stay as independent as possible.  How will a doctor assess your ADLs?  Asking about ADLs is part of a routine health checkup your doctor will likely do as you age. Your health check might be done in a doctor's office, in your home, or at a hospital. The goal is to find out if you are having any problems that could make it hard to care for yourself or that make it unsafe for you to be on your own.  To measure your ADLs, your doctor will ask how hard it is for you to do routine tasks. Your doctor may also want to know if you have changed the way you do a task because of a health problem. Your doctor may watch how you:  Walk back and forth.  Keep your balance while you stand or walk.  Move from sitting to standing or from a bed to a chair.  Button or unbutton a shirt or sweater.  Remove and put on your shoes.  It's common to feel a little worried or anxious if you find you can't do all the things you used to be able to do. Talking with your doctor about ADLs is a way to make sure you're as safe as possible and able to care for yourself as well as you can. You may want to bring a caregiver, friend, or family member to your checkup. They can help you talk to your doctor.  Follow-up care is a key part of your treatment and safety. Be sure to make and go to all appointments, and call your doctor if you are having problems. It's also a good idea to know your test results and keep a list of the medicines you take.  Current as of: October 24, 2023               Content Version: 14.0    5980-1445 Healthwise, [employer].   Care instructions adapted under license by your healthcare professional. If you have questions about a medical condition or this instruction, always ask your healthcare professional.  [employer], [facility] disclaims any warranty or liability for your use of this information.      Learning About Stress  What is stress?     Stress is your body's response to a hard situation. Your body can have a physical, emotional, or mental response. Stress is a fact of life for most people, and it affects everyone differently. What causes stress for you may not be stressful for someone else.  A lot of things can cause stress. You may feel stress when you go on a job interview, take a test, or run a race. This kind of short-term stress is normal and even useful. It can help you if you need to work hard or react quickly. For example, stress can help you finish an important job on time.  Long-term stress is caused by ongoing stressful situations or events. Examples of long-term stress include long-term health problems, ongoing problems at work, or conflicts in your family. Long-term stress can harm your health.  How does stress affect your health?  When you are stressed, your body responds as though you are in danger. It makes hormones that speed up your heart, make you breathe faster, and give you a burst of energy. This is called the fight-or-flight stress response. If the stress is over quickly, your body goes back to normal and no harm is done.  But if stress happens too often or lasts too long, it can have bad effects. Long-term stress can make you more likely to get sick, and it can make symptoms of some diseases worse. If you tense up when you are stressed, you may develop neck, shoulder, or low back pain. Stress is linked to high blood pressure and heart disease.  Stress also harms your emotional health. It can make you stone, tense, or depressed. Your relationships may suffer, and you may not do well at work or school.  What can you do to manage stress?  You can try these things to help manage stress:   Do something active. Exercise or activity can help reduce stress. Walking is a great way to get  started. Even everyday activities such as housecleaning or yard work can help.  Try yoga or omar chi. These techniques combine exercise and meditation. You may need some training at first to learn them.  Do something you enjoy. For example, listen to music or go to a movie. Practice your hobby or do volunteer work.  Meditate. This can help you relax, because you are not worrying about what happened before or what may happen in the future.  Do guided imagery. Imagine yourself in any setting that helps you feel calm. You can use online videos, books, or a teacher to guide you.  Do breathing exercises. For example:  From a standing position, bend forward from the waist with your knees slightly bent. Let your arms dangle close to the floor.  Breathe in slowly and deeply as you return to a standing position. Roll up slowly and lift your head last.  Hold your breath for just a few seconds in the standing position.  Breathe out slowly and bend forward from the waist.  Let your feelings out. Talk, laugh, cry, and express anger when you need to. Talking with supportive friends or family, a counselor, or a sydnie leader about your feelings is a healthy way to relieve stress. Avoid discussing your feelings with people who make you feel worse.  Write. It may help to write about things that are bothering you. This helps you find out how much stress you feel and what is causing it. When you know this, you can find better ways to cope.  What can you do to prevent stress?  You might try some of these things to help prevent stress:  Manage your time. This helps you find time to do the things you want and need to do.  Get enough sleep. Your body recovers from the stresses of the day while you are sleeping.  Get support. Your family, friends, and community can make a difference in how you experience stress.  Limit your news feed. Avoid or limit time on social media or news that may make you feel stressed.  Do something active. Exercise  "or activity can help reduce stress. Walking is a great way to get started.  Where can you learn more?  Go to https://www.[employer].net/patiented  Enter N032 in the search box to learn more about \"Learning About Stress.\"  Current as of: October 24, 2023               Content Version: 14.0    2919-1785 [employer].   Care instructions adapted under license by your healthcare professional. If you have questions about a medical condition or this instruction, always ask your healthcare professional. [employer] disclaims any warranty or liability for your use of this information.      "

## 2024-05-21 LAB
ALBUMIN SERPL BCG-MCNC: 4.3 G/DL (ref 3.5–5.2)
ALP SERPL-CCNC: 85 U/L (ref 40–150)
ALT SERPL W P-5'-P-CCNC: 20 U/L (ref 0–50)
ANION GAP SERPL CALCULATED.3IONS-SCNC: 12 MMOL/L (ref 7–15)
AST SERPL W P-5'-P-CCNC: 33 U/L (ref 0–45)
BILIRUB SERPL-MCNC: 0.7 MG/DL
BUN SERPL-MCNC: 21.7 MG/DL (ref 8–23)
CALCIUM SERPL-MCNC: 9.7 MG/DL (ref 8.8–10.2)
CHLORIDE SERPL-SCNC: 106 MMOL/L (ref 98–107)
CHOLEST SERPL-MCNC: 189 MG/DL
CREAT SERPL-MCNC: 0.88 MG/DL (ref 0.51–0.95)
CREAT UR-MCNC: 26.5 MG/DL
DEPRECATED HCO3 PLAS-SCNC: 22 MMOL/L (ref 22–29)
EGFRCR SERPLBLD CKD-EPI 2021: 66 ML/MIN/1.73M2
FASTING STATUS PATIENT QL REPORTED: YES
FASTING STATUS PATIENT QL REPORTED: YES
GLUCOSE SERPL-MCNC: 84 MG/DL (ref 70–99)
HDLC SERPL-MCNC: 98 MG/DL
LDLC SERPL CALC-MCNC: 79 MG/DL
MICROALBUMIN UR-MCNC: <12 MG/L
MICROALBUMIN/CREAT UR: NORMAL MG/G{CREAT}
NONHDLC SERPL-MCNC: 91 MG/DL
POTASSIUM SERPL-SCNC: 4.1 MMOL/L (ref 3.4–5.3)
PROT SERPL-MCNC: 6.7 G/DL (ref 6.4–8.3)
PTH-INTACT SERPL-MCNC: 56 PG/ML (ref 15–65)
SODIUM SERPL-SCNC: 140 MMOL/L (ref 135–145)
TRIGL SERPL-MCNC: 60 MG/DL
TSH SERPL DL<=0.005 MIU/L-ACNC: 3.29 UIU/ML (ref 0.3–4.2)
VIT B12 SERPL-MCNC: 545 PG/ML (ref 232–1245)
VIT D+METAB SERPL-MCNC: 58 NG/ML (ref 20–50)

## 2024-05-28 ENCOUNTER — ALLIED HEALTH/NURSE VISIT (OUTPATIENT)
Dept: FAMILY MEDICINE | Facility: CLINIC | Age: 84
End: 2024-05-28
Payer: COMMERCIAL

## 2024-05-28 VITALS — DIASTOLIC BLOOD PRESSURE: 58 MMHG | SYSTOLIC BLOOD PRESSURE: 122 MMHG

## 2024-05-28 DIAGNOSIS — I10 ESSENTIAL HYPERTENSION: Primary | ICD-10-CM

## 2024-05-28 PROCEDURE — 99207 PR NO CHARGE NURSE ONLY: CPT

## 2024-06-05 NOTE — PROGRESS NOTES
ASSESSMENT & PLAN       Today we discussed the underlying etiology/pathology of patient's   1. Primary osteoarthritis of left hip    2. Localized osteoarthritis of lumbar spine    3. Hx of total knee replacement, left- Suches appox. 2021    4. Stage 3a chronic kidney disease (H)    5. Osteopenia- repeat DEXA in 11/2024    6. Hx of fracture of humerus- left arm, 2021 treated non-op      -We discussed the patient is dealing with some progressive left hemipelvic pain related to progressive osteoarthritis documented on x-ray today which shows near bone-on-bone findings in the superior lateral aspect of the hip joint without fracture  - We discussed the etiology of osteoarthritis as well as various treatment options for the mainstay of management of osteoarthritis including rest, activity modification, proper technique with ambulation of steps/stairs which is to lead with good leg going up and lead with painful/symptomatic leg going down,  maintaining healthy weight management due to the 1:4 rule for joint stress with excess weight, herbal supplementation such as turmeric to decrease inflammation , healthy nutrition, use of oral NSAIDs (if not contraindicated due to patient being on chronic blood thinner medication or other medical conditions) with supplemental Tylenol up to 3000 mg daily, topical pain relievers such as Voltaren 1% gel to be applied 3 times a day to the area of pain, consideration for intra-articular cortisone injections or PRP injections, formal physical therapy for joint mobilization and strengthening and possible surgical consideration if all conservative treatments fail.   -Patient does have a history of some renal dysfunction which has improved over the last year.  She was counseled to avoid NSAIDs as this will cause further renal injury.  Patient should focus on taking Tylenol up to 3000 mg daily for pain control which should not affect her kidney function nor increase her cardiovascular risk  factors.  - Patient does have a history of falls with a recent fall affecting her right upper extremity.  She also fell resulting in a significant two-part humerus fracture on the left arm in 2021.  Physical therapy offered for left hip hemipelvic strengthening as well as gait training but this is being declined.  - Patient will modify her activities as most of her symptoms are aggravated with hip motion, especially abduction and pool therapy.  - We discussed her previous diagnosis of osteopenia and patient is having an updated DEXA scan in November.  Patient should reengage with her primary care provider about overall bone health and recommendations for improving bone density  - We discussed indications for intra-articular cortisone injection.  Patient is not symptomatic enough at this time to want to pursue injection therapy.  -If patient symptoms progressively worsen I be happy to see her back for reengagement, reassessment and discussion of further treatment options.  -Patient on x-ray does have significant lumbar spine disease with degenerative curvature to the right.  This likely is partially contributing to some chronic low back pain issues.  She describes intermittent tingling of her legs.  At this time I cannot rule out possible lumbar radiculopathy but a majority of her symptoms in regards to her left hemipelvic pain is related to her underlying progressive osteoarthritis on exam.  If patient wishes to have this evaluated further in the future she can make an appointment with myself or any of the nonop sports providers to have her lumbar spine more thoroughly evaluated.    -Call direct clinic number [[phone]] at any time with questions or concerns in regards to your recent office visit with me.     Aakash Adkins PA-C  [facility]    This note was completed in part using a voice recognition software, any grammatical or context distortion are unintentional and inherent to the  software.         SUBJECTIVE  Alexandra Menezes is a/an 83 year old female who is seen as a self referral for evaluation of left hip pain. The patient is seen by themselves.    Fell last week landing on right side of body after feeling dizzy and fell.  Was evaluated in the E.D. and work up was stable.      Onset: several month(s) ago. Reports insidious onset without acute precipitating event. Pt reports a fall in the last week but it did not seem to affect the hip.  Location of Pain: +C Sign - worst over greater trochanter; no radiation of pain; tingling in the knee  Rating of Pain at worst: 5/10  Rating of Pain Currently: 3/10  Worsened by: sporadic, walking/standing, lateral movement to the left, twisting the left leg, imbalance concerns, achy with stairs  Better with: mild with Ibuprofen and aerobics  Treatments tried: ibuprofen and water aerobics  Quality: aching, sharp, stabbing, pinching  Associated symptoms: tingling and feeling of instability  Orthopedic history: NO  Relevant surgical history: YES - Date: 03/2019 - L TKA  Social history: social history: retired; water aerobics 5x/week, walking several days per week    Past Medical History:   Diagnosis Date    Arrhythmia     take propafenone    Arthritis     Breast cancer (H) 1992    right Mastectomy    CKD (chronic kidney disease) stage 3, GFR 30-59 ml/min (H) 3/21/2019    Disease of thyroid gland     hypothyroid    Hypercholesterolemia     Hypertension     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    Sinus bradycardia     frequent PVC's    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     Social History     Socioeconomic History    Marital status:    Tobacco Use    Smoking status: Never    Smokeless tobacco: Never   Vaping Use    Vaping status: Never Used   Substance and Sexual Activity    Alcohol use: Yes     Alcohol/week: 2.0 standard drinks of alcohol     Comment: occ    Drug use: No    Sexual activity: Never     Social Determinants of  Health     Financial Resource Strain: Low Risk  (5/14/2024)    Financial Resource Strain     Within the past 12 months, have you or your family members you live with been unable to get utilities (heat, electricity) when it was really needed?: No   Food Insecurity: Low Risk  (5/14/2024)    Food Insecurity     Within the past 12 months, did you worry that your food would run out before you got money to buy more?: No     Within the past 12 months, did the food you bought just not last and you didn t have money to get more?: No   Transportation Needs: Low Risk  (5/14/2024)    Transportation Needs     Within the past 12 months, has lack of transportation kept you from medical appointments, getting your medicines, non-medical meetings or appointments, work, or from getting things that you need?: No   Physical Activity: Sufficiently Active (5/14/2024)    Exercise Vital Sign     Days of Exercise per Week: 5 days     Minutes of Exercise per Session: 40 min   Stress: No Stress Concern Present (5/14/2024)    [employer] [place] [employer] - Occupational Stress Questionnaire     Feeling of Stress : Only a little   Social Connections: Unknown (5/14/2024)    Social Connection and Isolation Panel [NHANES]     Frequency of Social Gatherings with Friends and Family: More than three times a week   Interpersonal Safety: Low Risk  (5/20/2024)    Interpersonal Safety     Do you feel physically and emotionally safe where you currently live?: Yes     Within the past 12 months, have you been hit, slapped, kicked or otherwise physically hurt by someone?: No     Within the past 12 months, have you been humiliated or emotionally abused in other ways by your partner or ex-partner?: No   Housing Stability: Low Risk  (5/14/2024)    Housing Stability     Do you have housing? : Yes     Are you worried about losing your housing?: No         Patient's past medical, surgical, social, and family histories were personally reviewed today and  no changes are noted.    REVIEW OF SYSTEMS:  10 point ROS is negative other than symptoms noted above in HPI, Past Medical History or as stated below  Constitutional: NEGATIVE for fever, chills, change in weight  Skin: NEGATIVE for worrisome rashes, moles or lesions  GI/: NEGATIVE for bowel or bladder changes  Neuro: NEGATIVE for weakness, dizziness or paresthesias    OBJECTIVE:  Vital signs as noted in EPIC for 6/5/2024  General: healthy, alert and in no distress  HEENT: no scleral icterus or conjunctival erythema  Skin: no suspicious lesions or rash. No jaundice.  CV: no pedal edema  Resp: normal respiratory effort without conversational dyspnea   Psych: normal mood and affect  Gait: normal steady gait with appropriate coordination and balance  Neuro: Normal light sensory exam of lower extremity      MSK:  Exam shows a pleasant 83-year-old female ambulates full weightbearing without assistive device.  She does not pick her feet up off the floor very high.  She has a forward flexed spine position consistent with low back degenerative disc disease.  She is alert and oriented x 3.  Negative Trendelenburg gait.  Examination of both lower extremities with skin exposed shows previous surgical incision over the left knee which is healed consistent with total knee arthroplasty.  Patient has decreased internal rotation of left hip reproducing left hemipelvic pain which is exasperated by flexing her knee with internal rotation again causing hemipelvic pain in the groin and lateral hip/buttock region.  Resisted hip flexion generates anterior groin and hemipelvic pain.  5 out of 5 motor tone still noted bilaterally.  Grossly she is neurovascular intact L2-S1 bilaterally.  Crossing her legs causes compression of the fibular nerve at the lateral knee causing some numbness and tingling below the knees into the feet.  Patient can stand unilaterally on each leg with acceptable balance but increased symptoms on the left  hemipelvis/lateral hip of the hip abductors.  She is mildly tender on the greater trochanter and hip abductor gluteal tissues on the left.  Nontender throughout the spinous processes of the lumbar column and paraspinous muscles of the lumbar column.  She does have degenerative curve towards the right in the lumbar column noted.  Patient is able to forward flex and get her fingertips to about mid shin level with some generalized pulling in the back of the legs but no radicular findings.  Lumbar extension is limited causing increased low back pain as well as increased symptoms on her left hip with lateral bending to the right.  Straight leg raises negative for radicular component.            Personal Independent visualization of the below images done today:  2 view x-rays the patient's pelvis and left hip are obtained and reviewed today showing grade 3 osteoarthritis of the left hip with near bone-on-bone changes in the superior hip joint with increased sclerotic findings.  No evidence of fracture or dislocation.  Degenerative changes of the lumbar spine are noted with degenerative curve towards the right side.  Metallic foreign body noted in the pelvis of unknown origin.    Patient's conditions were thoroughly discussed during today's visit with total time reviewing patient's previous medical records/history/radiology, face-to-face examination and discussion and plan of care with the patient and documentation being 35 minutes for today's clinical visit  Aakash Adkins PA-C  [facility]    This note was completed in part using a voice recognition software, any grammatical or context distortion are unintentional and inherent to the software.

## 2024-06-19 ENCOUNTER — ANCILLARY PROCEDURE (OUTPATIENT)
Dept: GENERAL RADIOLOGY | Facility: CLINIC | Age: 84
End: 2024-06-19
Attending: PHYSICIAN ASSISTANT
Payer: COMMERCIAL

## 2024-06-19 ENCOUNTER — OFFICE VISIT (OUTPATIENT)
Dept: ORTHOPEDICS | Facility: CLINIC | Age: 84
End: 2024-06-19
Payer: COMMERCIAL

## 2024-06-19 VITALS
WEIGHT: 158 LBS | BODY MASS INDEX: 28 KG/M2 | SYSTOLIC BLOOD PRESSURE: 155 MMHG | DIASTOLIC BLOOD PRESSURE: 75 MMHG | HEIGHT: 63 IN

## 2024-06-19 DIAGNOSIS — M16.12 PRIMARY OSTEOARTHRITIS OF LEFT HIP: Primary | ICD-10-CM

## 2024-06-19 DIAGNOSIS — M85.80 OSTEOPENIA, UNSPECIFIED LOCATION: ICD-10-CM

## 2024-06-19 DIAGNOSIS — Z87.81 HX OF FRACTURE OF HUMERUS: ICD-10-CM

## 2024-06-19 DIAGNOSIS — M25.552 LEFT HIP PAIN: ICD-10-CM

## 2024-06-19 DIAGNOSIS — Z96.652 HX OF TOTAL KNEE REPLACEMENT, LEFT: ICD-10-CM

## 2024-06-19 DIAGNOSIS — N18.31 STAGE 3A CHRONIC KIDNEY DISEASE (H): Chronic | ICD-10-CM

## 2024-06-19 DIAGNOSIS — M47.816 LOCALIZED OSTEOARTHRITIS OF LUMBAR SPINE: ICD-10-CM

## 2024-06-19 PROCEDURE — 73501 X-RAY EXAM HIP UNI 1 VIEW: CPT | Mod: TC | Performed by: RADIOLOGY

## 2024-06-19 PROCEDURE — 99203 OFFICE O/P NEW LOW 30 MIN: CPT | Performed by: PHYSICIAN ASSISTANT

## 2024-06-19 NOTE — LETTER
6/19/2024      Alexandra Menezes  [address] [place] [address]      Dear Colleague,    Thank you for referring your patient, Alexandra Menezes, to the [facility]. Please see a copy of my visit note below.    ASSESSMENT & PLAN       Today we discussed the underlying etiology/pathology of patient's   1. Primary osteoarthritis of left hip    2. Localized osteoarthritis of lumbar spine    3. Hx of total knee replacement, left- [place] appox. 2021    4. Stage 3a chronic kidney disease (H)    5. Osteopenia- repeat DEXA in 11/2024    6. Hx of fracture of humerus- left arm, 2021 treated non-op      -We discussed the patient is dealing with some progressive left hemipelvic pain related to progressive osteoarthritis documented on x-ray today which shows near bone-on-bone findings in the superior lateral aspect of the hip joint without fracture  - We discussed the etiology of osteoarthritis as well as various treatment options for the mainstay of management of osteoarthritis including rest, activity modification, proper technique with ambulation of steps/stairs which is to lead with good leg going up and lead with painful/symptomatic leg going down,  maintaining healthy weight management due to the 1:4 rule for joint stress with excess weight, herbal supplementation such as turmeric to decrease inflammation , healthy nutrition, use of oral NSAIDs (if not contraindicated due to patient being on chronic blood thinner medication or other medical conditions) with supplemental Tylenol up to 3000 mg daily, topical pain relievers such as Voltaren 1% gel to be applied 3 times a day to the area of pain, consideration for intra-articular cortisone injections or PRP injections, formal physical therapy for joint mobilization and strengthening and possible surgical consideration if all conservative treatments fail.   -Patient does have a history of some renal dysfunction which  has improved over the last year.  She was counseled to avoid NSAIDs as this will cause further renal injury.  Patient should focus on taking Tylenol up to 3000 mg daily for pain control which should not affect her kidney function nor increase her cardiovascular risk factors.  - Patient does have a history of falls with a recent fall affecting her right upper extremity.  She also fell resulting in a significant two-part humerus fracture on the left arm in 2021.  Physical therapy offered for left hip hemipelvic strengthening as well as gait training but this is being declined.  - Patient will modify her activities as most of her symptoms are aggravated with hip motion, especially abduction and pool therapy.  - We discussed her previous diagnosis of osteopenia and patient is having an updated DEXA scan in November.  Patient should reengage with her primary care provider about overall bone health and recommendations for improving bone density  - We discussed indications for intra-articular cortisone injection.  Patient is not symptomatic enough at this time to want to pursue injection therapy.  -If patient symptoms progressively worsen I be happy to see her back for reengagement, reassessment and discussion of further treatment options.  -Patient on x-ray does have significant lumbar spine disease with degenerative curvature to the right.  This likely is partially contributing to some chronic low back pain issues.  She describes intermittent tingling of her legs.  At this time I cannot rule out possible lumbar radiculopathy but a majority of her symptoms in regards to her left hemipelvic pain is related to her underlying progressive osteoarthritis on exam.  If patient wishes to have this evaluated further in the future she can make an appointment with myself or any of the nonop sports providers to have her lumbar spine more thoroughly evaluated.    -Call direct clinic number [[phone]] at any time with questions or  concerns in regards to your recent office visit with me.     Aakash Adkins PA-C  [facility]    This note was completed in part using a voice recognition software, any grammatical or context distortion are unintentional and inherent to the software.         SUBJECTIVE  Alexandra Menezes is a/an 83 year old female who is seen as a self referral for evaluation of left hip pain. The patient is seen by themselves.    Fell last week landing on right side of body after feeling dizzy and fell.  Was evaluated in the E.D. and work up was stable.      Onset: several month(s) ago. Reports insidious onset without acute precipitating event. Pt reports a fall in the last week but it did not seem to affect the hip.  Location of Pain: +C Sign - worst over greater trochanter; no radiation of pain; tingling in the knee  Rating of Pain at worst: 5/10  Rating of Pain Currently: 3/10  Worsened by: sporadic, walking/standing, lateral movement to the left, twisting the left leg, imbalance concerns, achy with stairs  Better with: mild with Ibuprofen and aerobics  Treatments tried: ibuprofen and water aerobics  Quality: aching, sharp, stabbing, pinching  Associated symptoms: tingling and feeling of instability  Orthopedic history: NO  Relevant surgical history: YES - Date: 03/2019 - L TKA  Social history: social history: retired; water aerobics 5x/week, walking several days per week    Past Medical History:   Diagnosis Date     Arrhythmia     take propafenone     Arthritis      Breast cancer (H) 1992    right Mastectomy     CKD (chronic kidney disease) stage 3, GFR 30-59 ml/min (H) 3/21/2019     Disease of thyroid gland     hypothyroid     Hypercholesterolemia      Hypertension      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     Sinus bradycardia     frequent PVC's     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     Social History     Socioeconomic History     Marital status:    Tobacco Use      Smoking status: Never     Smokeless tobacco: Never   Vaping Use     Vaping status: Never Used   Substance and Sexual Activity     Alcohol use: Yes     Alcohol/week: 2.0 standard drinks of alcohol     Comment: occ     Drug use: No     Sexual activity: Never     Social Determinants of Health     Financial Resource Strain: Low Risk  (5/14/2024)    Financial Resource Strain      Within the past 12 months, have you or your family members you live with been unable to get utilities (heat, electricity) when it was really needed?: No   Food Insecurity: Low Risk  (5/14/2024)    Food Insecurity      Within the past 12 months, did you worry that your food would run out before you got money to buy more?: No      Within the past 12 months, did the food you bought just not last and you didn t have money to get more?: No   Transportation Needs: Low Risk  (5/14/2024)    Transportation Needs      Within the past 12 months, has lack of transportation kept you from medical appointments, getting your medicines, non-medical meetings or appointments, work, or from getting things that you need?: No   Physical Activity: Sufficiently Active (5/14/2024)    Exercise Vital Sign      Days of Exercise per Week: 5 days      Minutes of Exercise per Session: 40 min   Stress: No Stress Concern Present (5/14/2024)    [employer] [place] [employer] - Occupational Stress Questionnaire      Feeling of Stress : Only a little   Social Connections: Unknown (5/14/2024)    Social Connection and Isolation Panel [NHANES]      Frequency of Social Gatherings with Friends and Family: More than three times a week   Interpersonal Safety: Low Risk  (5/20/2024)    Interpersonal Safety      Do you feel physically and emotionally safe where you currently live?: Yes      Within the past 12 months, have you been hit, slapped, kicked or otherwise physically hurt by someone?: No      Within the past 12 months, have you been humiliated or emotionally abused in  other ways by your partner or ex-partner?: No   Housing Stability: Low Risk  (5/14/2024)    Housing Stability      Do you have housing? : Yes      Are you worried about losing your housing?: No         Patient's past medical, surgical, social, and family histories were personally reviewed today and no changes are noted.    REVIEW OF SYSTEMS:  10 point ROS is negative other than symptoms noted above in HPI, Past Medical History or as stated below  Constitutional: NEGATIVE for fever, chills, change in weight  Skin: NEGATIVE for worrisome rashes, moles or lesions  GI/: NEGATIVE for bowel or bladder changes  Neuro: NEGATIVE for weakness, dizziness or paresthesias    OBJECTIVE:  Vital signs as noted in EPIC for 6/5/2024  General: healthy, alert and in no distress  HEENT: no scleral icterus or conjunctival erythema  Skin: no suspicious lesions or rash. No jaundice.  CV: no pedal edema  Resp: normal respiratory effort without conversational dyspnea   Psych: normal mood and affect  Gait: normal steady gait with appropriate coordination and balance  Neuro: Normal light sensory exam of lower extremity      MSK:  Exam shows a pleasant 83-year-old female ambulates full weightbearing without assistive device.  She does not pick her feet up off the floor very high.  She has a forward flexed spine position consistent with low back degenerative disc disease.  She is alert and oriented x 3.  Negative Trendelenburg gait.  Examination of both lower extremities with skin exposed shows previous surgical incision over the left knee which is healed consistent with total knee arthroplasty.  Patient has decreased internal rotation of left hip reproducing left hemipelvic pain which is exasperated by flexing her knee with internal rotation again causing hemipelvic pain in the groin and lateral hip/buttock region.  Resisted hip flexion generates anterior groin and hemipelvic pain.  5 out of 5 motor tone still noted bilaterally.  Grossly she  is neurovascular intact L2-S1 bilaterally.  Crossing her legs causes compression of the fibular nerve at the lateral knee causing some numbness and tingling below the knees into the feet.  Patient can stand unilaterally on each leg with acceptable balance but increased symptoms on the left hemipelvis/lateral hip of the hip abductors.  She is mildly tender on the greater trochanter and hip abductor gluteal tissues on the left.  Nontender throughout the spinous processes of the lumbar column and paraspinous muscles of the lumbar column.  She does have degenerative curve towards the right in the lumbar column noted.  Patient is able to forward flex and get her fingertips to about mid shin level with some generalized pulling in the back of the legs but no radicular findings.  Lumbar extension is limited causing increased low back pain as well as increased symptoms on her left hip with lateral bending to the right.  Straight leg raises negative for radicular component.            Personal Independent visualization of the below images done today:  2 view x-rays the patient's pelvis and left hip are obtained and reviewed today showing grade 3 osteoarthritis of the left hip with near bone-on-bone changes in the superior hip joint with increased sclerotic findings.  No evidence of fracture or dislocation.  Degenerative changes of the lumbar spine are noted with degenerative curve towards the right side.  Metallic foreign body noted in the pelvis of unknown origin.    Patient's conditions were thoroughly discussed during today's visit with total time reviewing patient's previous medical records/history/radiology, face-to-face examination and discussion and plan of care with the patient and documentation being 35 minutes for today's clinical visit  Aakash Adkins PA-C  [facility]    This note was completed in part using a voice recognition software, any grammatical or context distortion are unintentional  and inherent to the software.       Again, thank you for allowing me to participate in the care of your patient.        Sincerely,        Aakash Adkins PA-C

## 2024-06-19 NOTE — PATIENT INSTRUCTIONS
Today we discussed the underlying etiology/pathology of patient's   1. Primary osteoarthritis of left hip    2. Localized osteoarthritis of lumbar spine    3. Hx of total knee replacement, left- [place] appox. 2021    4. Stage 3a chronic kidney disease (H)    5. Osteopenia- repeat DEXA in 11/2024    6. Hx of fracture of humerus- left arm, 2021 treated non-op      -We discussed the patient is dealing with some progressive left hemipelvic pain related to progressive osteoarthritis documented on x-ray today which shows near bone-on-bone findings in the superior lateral aspect of the hip joint without fracture  - We discussed the etiology of osteoarthritis as well as various treatment options for the mainstay of management of osteoarthritis including rest, activity modification, proper technique with ambulation of steps/stairs which is to lead with good leg going up and lead with painful/symptomatic leg going down,  maintaining healthy weight management due to the 1:4 rule for joint stress with excess weight, herbal supplementation such as turmeric to decrease inflammation , healthy nutrition, use of oral NSAIDs (if not contraindicated due to patient being on chronic blood thinner medication or other medical conditions) with supplemental Tylenol up to 3000 mg daily, topical pain relievers such as Voltaren 1% gel to be applied 3 times a day to the area of pain, consideration for intra-articular cortisone injections or PRP injections, formal physical therapy for joint mobilization and strengthening and possible surgical consideration if all conservative treatments fail.   -Patient does have a history of some renal dysfunction which has improved over the last year.  She was counseled to avoid NSAIDs as this will cause further renal injury.  Patient should focus on taking Tylenol up to 3000 mg daily for pain control which should not affect her kidney function nor increase her cardiovascular risk factors.  - Patient  does have a history of falls with a recent fall affecting her right upper extremity.  She also fell resulting in a significant two-part humerus fracture on the left arm in 2021.  Physical therapy offered for left hip hemipelvic strengthening as well as gait training but this is being declined.  - Patient will modify her activities as most of her symptoms are aggravated with hip motion, especially abduction and pool therapy.  - We discussed her previous diagnosis of osteopenia and patient is having an updated DEXA scan in November.  Patient should reengage with her primary care provider about overall bone health and recommendations for improving bone density  - We discussed indications for intra-articular cortisone injection.  Patient is not symptomatic enough at this time to want to pursue injection therapy.  -If patient symptoms progressively worsen I be happy to see her back for reengagement, reassessment and discussion of further treatment options.  -Patient on x-ray does have significant lumbar spine disease with degenerative curvature to the right.  This likely is partially contributing to some chronic low back pain issues.  She describes intermittent tingling of her legs.  At this time I cannot rule out possible lumbar radiculopathy but a majority of her symptoms in regards to her left hemipelvic pain is related to her underlying progressive osteoarthritis on exam.  If patient wishes to have this evaluated further in the future she can make an appointment with myself or any of the nonop sports providers to have her lumbar spine more thoroughly evaluated.    -Call direct clinic number [[phone]] at any time with questions or concerns in regards to your recent office visit with me.     Aakash Adkins PA-C  [facility]    This note was completed in part using a voice recognition software, any grammatical or context distortion are unintentional and inherent to the software.

## 2024-06-20 ENCOUNTER — OFFICE VISIT (OUTPATIENT)
Dept: FAMILY MEDICINE | Facility: CLINIC | Age: 84
End: 2024-06-20
Payer: COMMERCIAL

## 2024-06-20 VITALS
RESPIRATION RATE: 16 BRPM | DIASTOLIC BLOOD PRESSURE: 62 MMHG | SYSTOLIC BLOOD PRESSURE: 130 MMHG | HEIGHT: 64 IN | BODY MASS INDEX: 27.11 KG/M2 | WEIGHT: 158.8 LBS | HEART RATE: 55 BPM | TEMPERATURE: 97.2 F | OXYGEN SATURATION: 99 %
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
## 2024-06-20 DIAGNOSIS — I10 ESSENTIAL HYPERTENSION: Primary | ICD-10-CM

## 2024-06-20 PROCEDURE — G2211 COMPLEX E/M VISIT ADD ON: HCPCS | Performed by: FAMILY MEDICINE

## 2024-06-20 PROCEDURE — 99214 OFFICE O/P EST MOD 30 MIN: CPT | Performed by: FAMILY MEDICINE

## 2024-06-20 RX ORDER — RESPIRATORY SYNCYTIAL VIRUS VACCINE 120MCG/0.5
0.5 KIT INTRAMUSCULAR ONCE
Qty: 1 EACH | Refills: 0 | Status: CANCELLED | OUTPATIENT
Start: 2024-06-20 | End: 2024-06-20

## 2024-06-20 RX ORDER — LISINOPRIL 10 MG/1
5 TABLET ORAL DAILY
Qty: 45 TABLET | Refills: 1 | Status: SHIPPED | OUTPATIENT
Start: 2024-06-20 | End: 2024-07-08

## 2024-06-20 ASSESSMENT — PAIN SCALES - GENERAL: PAINLEVEL: NO PAIN (0)

## 2024-06-20 NOTE — PROGRESS NOTES
"  Assessment & Plan     Essential hypertension  Blood pressure today is controlled on recheck, however home blood pressure sometimes running in the 90s systolic which could be contributing to episode of falling.  I am concerned we are overtreating her and that she has a whitecoat hypertension component.  Even with reduction of lisinopril back to 10 mg daily, her blood pressure this morning was systolic of 90.  I like her to return to just 5 mg daily, check blood pressures regularly over the next 2 to 4 weeks and then update us.  - lisinopril (ZESTRIL) 10 MG tablet; Take 0.5 tablets (5 mg) by mouth daily    Fall, subsequent encounter  Adjust hypertension treatment as above.  She is not otherwise at high risk for imbalance or falling, no additional interventions necessary.  Monitor.    MED REC REQUIRED  Post Medication Reconciliation Status: discharge medications reconciled and changed, per note/orders  BMI  Estimated body mass index is 27.69 kg/m  as calculated from the following:    Height as of this encounter: 1.613 m (5' 3.5\").    Weight as of this encounter: 72 kg (158 lb 12.8 oz).             eWn Morris is a 83 year old, presenting for the following health issues:  Hospital F/U (ER follow up 6-13-24  in [place], fall due to dizziness.)    Having lunch with friends, seated in the sun, stood up from the table, felt dizzy, and fell.  No loss of consciousness.  EMS was called and she went to hospital where evaluation identified no fractures or concerns.  Her son reviewed her medicines with her just a few days ago and realize she was taking 10 mg of lisinopril twice a day, was supposed to be taking it just once daily but she had misunderstood instructions at last visit.  For the past 3 days she has been taking only 10 mg daily, despite this her blood pressure this morning was 90/50.  Blood pressures at home have otherwise been 104-122 systolic, 50 to 70s diastolic.  No further episodes of dizziness.  " "No palpitations.  She has not had any sense of imbalance or gait instability.                       Objective    /62   Pulse 55   Temp 97.2  F (36.2  C) (Temporal)   Resp 16   Ht 1.613 m (5' 3.5\")   Wt 72 kg (158 lb 12.8 oz)   SpO2 99%   BMI 27.69 kg/m    Body mass index is 27.69 kg/m .  Physical Exam   Alert very pleasant.  Mucous membranes moist.  Heart with regular rate and rhythm.  Lungs clear.  No edema.  Ecchymosis of both upper extremities, good range of motion without activity limitation.    Patient brought her home blood pressure cuff with her today, we confirm that it is concordant with our blood pressure cuff readings.            Signed Electronically by: Donna Buchanan MD    "

## 2024-06-20 NOTE — PATIENT INSTRUCTIONS
Your blood pressure is running too low at times, even with reducing your lisinopril dose back to 10 mg.  Reduce your lisinopril dose back down to 5 mg (one half tab of the 10 mg tablet) once daily in the evening.  Continue to check your blood pressure 1-2 times daily and update me in 2 to 4 weeks with the results.  If you are seeing blood pressures that are <100 (top number) or if you are frequently seeing blood pressures that are >140/>90, please let me know sooner.

## 2024-07-08 ENCOUNTER — OFFICE VISIT (OUTPATIENT)
Dept: CARDIOLOGY | Facility: CLINIC | Age: 84
End: 2024-07-08
Payer: COMMERCIAL

## 2024-07-08 ENCOUNTER — ORDERS ONLY (AUTO-RELEASED) (OUTPATIENT)
Dept: CARDIOLOGY | Facility: CLINIC | Age: 84
End: 2024-07-08
Payer: COMMERCIAL

## 2024-07-08 VITALS
HEIGHT: 64 IN | RESPIRATION RATE: 15 BRPM | WEIGHT: 160 LBS | DIASTOLIC BLOOD PRESSURE: 62 MMHG | HEART RATE: 56 BPM | SYSTOLIC BLOOD PRESSURE: 122 MMHG | BODY MASS INDEX: 27.31 KG/M2

## 2024-07-08 DIAGNOSIS — I25.119 CORONARY ARTERY DISEASE INVOLVING NATIVE CORONARY ARTERY OF NATIVE HEART WITH ANGINA PECTORIS (H): Primary | ICD-10-CM

## 2024-07-08 DIAGNOSIS — R55 SYNCOPE, UNSPECIFIED SYNCOPE TYPE: ICD-10-CM

## 2024-07-08 DIAGNOSIS — I49.3 FREQUENT UNIFOCAL PREMATURE VENTRICULAR CONTRACTIONS: ICD-10-CM

## 2024-07-08 LAB
ATRIAL RATE - MUSE: 51 BPM
DIASTOLIC BLOOD PRESSURE - MUSE: NORMAL MMHG
INTERPRETATION ECG - MUSE: NORMAL
P AXIS - MUSE: 59 DEGREES
PR INTERVAL - MUSE: 168 MS
QRS DURATION - MUSE: 94 MS
QT - MUSE: 436 MS
QTC - MUSE: 401 MS
R AXIS - MUSE: -34 DEGREES
SYSTOLIC BLOOD PRESSURE - MUSE: NORMAL MMHG
T AXIS - MUSE: -9 DEGREES
VENTRICULAR RATE- MUSE: 51 BPM

## 2024-07-08 PROCEDURE — 93000 ELECTROCARDIOGRAM COMPLETE: CPT | Performed by: STUDENT IN AN ORGANIZED HEALTH CARE EDUCATION/TRAINING PROGRAM

## 2024-07-08 PROCEDURE — 99214 OFFICE O/P EST MOD 30 MIN: CPT | Performed by: INTERNAL MEDICINE

## 2024-07-08 RX ORDER — PREDNISOLONE ACETATE 10 MG/ML
1 SUSPENSION/ DROPS OPHTHALMIC DAILY
COMMUNITY
Start: 2024-06-27

## 2024-07-08 NOTE — PROGRESS NOTES
"    Cardiology Progress Note     Assessment:  Frequent PVCs, symptomatically well suppressed with propafenone  Falls possible syncope likely due to orthostatic hypotension  History of hypertension with recent low blood pressure readings  Coronary artery disease, mild, nonobstructive by CT angiogram in 2022      Plan:  Stop lisinopril and continue to watch blood pressure    Event monitor to rule out bradycardia    Repeat CT coronary angiogram to rule out progression of coronary artery disease    Echo to assess LV function    Routine follow-up in 1 year    Subjective:   This is 83 year old female who comes in today for follow-up visit.  She denies heart palpitations.  She does report that she gets lightheaded and dizzy at times.  She fell down a few weeks ago.  She is not sure if she lost consciousness.  She checks blood pressure at home regularly.  She has had number of readings of systolic blood pressure less than 90.  Her primary decreased dose of lisinopril from 10 to 5 mg a day 2 weeks ago.  In spite of that adjustment she had low blood pressure readings yesterday and today in the morning.  She denies exertional chest pain.  She swims regularly 4- 5 times a week.  She has not had weight gain, PND, orthopnea.    Review of Systems:   Negative other than history of present illness    Objective:   /62   Pulse 56   Resp 15   Ht 1.613 m (5' 3.5\")   Wt 72.6 kg (160 lb)   BMI 27.90 kg/m    Physical Exam:  GENERAL: no distress  NECK: No JVD  LUNGS: Clear to auscultation.  CARDIAC: regular rhythm, S1 & S2 normal.  No heaves, thrills, gallops or murmurs.  ABDOMEN: flat, negative hepatosplenomegaly, soft and non-tender.  EXTREMITIES: No evidence of cyanosis, clubbing or edema.    Current Outpatient Medications   Medication Sig Dispense Refill    atorvastatin (LIPITOR) 20 MG tablet TAKE 1 TABLET BY MOUTH EVERY DAY 90 tablet 3    ketorolac (ACULAR) 0.5 % ophthalmic solution Place 1 drop Into the left eye 2 times " daily      latanoprost (XALATAN) 0.005 % ophthalmic solution Place 1 drop into both eyes At Bedtime   3    levothyroxine (SYNTHROID/LEVOTHROID) 25 MCG tablet TAKE 1 TABLET BY MOUTH DAILY AT 6 AM 90 tablet 2    lisinopril (ZESTRIL) 10 MG tablet Take 0.5 tablets (5 mg) by mouth daily 45 tablet 1    omega-3 fatty acids-fish oil (OMEGA 3 FISH OIL) 684-1,200 mg CpDR [OMEGA-3 FATTY ACIDS-FISH OIL (OMEGA 3 FISH OIL) 684-1,200 MG CPDR] Take 2 tablets by mouth every morning.      prednisoLONE acetate (PRED FORTE) 1 % ophthalmic suspension Place 1 drop Into the left eye daily      propafenone (RYTHMOL) 150 MG TABS tablet TAKE 1 TABLET BY MOUTH TWICE A  tablet 1    timolol maleate (TIMOPTIC) 0.25 % ophthalmic solution Place 1 drop Into the left eye 2 times daily         Cardiographics:    ECG: Read by me 7/8/2024 sinus bradycardia normal QRS duration no ischemic changes    Event monitor: August 2022  Borderline multi day patient activated monitor with possible association of ectopy with the patient's symptoms.  There was no sustained arrhythmia or significant conduction abnormality.  The presence of junctional beats could signify either some intrinsic sinus node dysfunction or possible enhanced vagal tone.  No sustained atrial or ventricular tachyarrhythmia.  No profound bradycardia or significant pauses.    CT coronary angio: January 2022  1.  Total coronary calcium score 35 in mid LAD places the individual in the 50th percentile when compared to an age and gender matched control group and implies a moderate risk of cardiac events in the next ten years.  2.  Normal left main.  3.  Mild disease mid LAD about 20-30% stenosis.  The rest LAD and diagonal branches are normal.  4.  Normal LCx and RCA.  Right dominant system.  5.  Normal size of visualized aortic segment.  6.  No thrombus in left atrial appendage.  7.  No pericardial effusion.   Lab Results    Chemistry/lipid CBC Cardiac Enzymes/BNP/TSH/INR   Recent Labs  "  Lab Test 05/20/24  1536   CHOL 189   HDL 98   LDL 79   TRIG 60     Recent Labs   Lab Test 05/20/24  1536 05/17/23  1421 05/12/22  0935   LDL 79 86 113     Recent Labs   Lab Test 05/20/24  1536      POTASSIUM 4.1   CHLORIDE 106   CO2 22   GLC 84   BUN 21.7   CR 0.88   GFRESTIMATED 66   MILADYS 9.7     Recent Labs   Lab Test 05/20/24  1536 05/17/23  1421 07/27/22  1103   CR 0.88 1.02* 1.13*     Recent Labs   Lab Test 05/20/24  1536   A1C 4.9          Recent Labs   Lab Test 05/20/24  1536 05/17/23  1421   WBC  --  4.1   HGB 13.6 13.0   HCT  --  40.8   MCV  --  93   PLT  --  175     Recent Labs   Lab Test 05/20/24  1536 05/17/23  1421 03/14/23  0749   HGB 13.6 13.0 13.4    Recent Labs   Lab Test 09/04/21  0035   TROPONINI <0.01     No results for input(s): \"BNP\", \"NTBNPI\", \"NTBNP\" in the last 15908 hours.  Recent Labs   Lab Test 05/20/24  1536   TSH 3.29     Recent Labs   Lab Test 09/03/21  1708 03/21/19  0711   INR 0.97 0.93                     "

## 2024-07-08 NOTE — LETTER
"7/8/2024    Donna Buchanan MD  [address]  [facility] [address]    RE: Alexandra Menezes       Dear Colleague,     I had the pleasure of seeing Alexandra Menezes in the [facility].      Cardiology Progress Note     Assessment:  Frequent PVCs, symptomatically well suppressed with propafenone  Falls possible syncope likely due to orthostatic hypotension  History of hypertension with recent low blood pressure readings  Coronary artery disease, mild, nonobstructive by CT angiogram in 2022      Plan:  Stop lisinopril and continue to watch blood pressure    Event monitor to rule out bradycardia    Repeat CT coronary angiogram to rule out progression of coronary artery disease    Echo to assess LV function    Routine follow-up in 1 year    Subjective:   This is 83 year old female who comes in today for follow-up visit.  She denies heart palpitations.  She does report that she gets lightheaded and dizzy at times.  She fell down a few weeks ago.  She is not sure if she lost consciousness.  She checks blood pressure at home regularly.  She has had number of readings of systolic blood pressure less than 90.  Her primary decreased dose of lisinopril from 10 to 5 mg a day 2 weeks ago.  In spite of that adjustment she had low blood pressure readings yesterday and today in the morning.  She denies exertional chest pain.  She swims regularly 4- 5 times a week.  She has not had weight gain, PND, orthopnea.    Review of Systems:   Negative other than history of present illness    Objective:   /62   Pulse 56   Resp 15   Ht 1.613 m (5' 3.5\")   Wt 72.6 kg (160 lb)   BMI 27.90 kg/m    Physical Exam:  GENERAL: no distress  NECK: No JVD  LUNGS: Clear to auscultation.  CARDIAC: regular rhythm, S1 & S2 normal.  No heaves, thrills, gallops or murmurs.  ABDOMEN: flat, negative hepatosplenomegaly, soft and non-tender.  EXTREMITIES: No evidence of cyanosis, clubbing or edema.    Current Outpatient " Medications   Medication Sig Dispense Refill    atorvastatin (LIPITOR) 20 MG tablet TAKE 1 TABLET BY MOUTH EVERY DAY 90 tablet 3    ketorolac (ACULAR) 0.5 % ophthalmic solution Place 1 drop Into the left eye 2 times daily      latanoprost (XALATAN) 0.005 % ophthalmic solution Place 1 drop into both eyes At Bedtime   3    levothyroxine (SYNTHROID/LEVOTHROID) 25 MCG tablet TAKE 1 TABLET BY MOUTH DAILY AT 6 AM 90 tablet 2    lisinopril (ZESTRIL) 10 MG tablet Take 0.5 tablets (5 mg) by mouth daily 45 tablet 1    omega-3 fatty acids-fish oil (OMEGA 3 FISH OIL) 684-1,200 mg CpDR [OMEGA-3 FATTY ACIDS-FISH OIL (OMEGA 3 FISH OIL) 684-1,200 MG CPDR] Take 2 tablets by mouth every morning.      prednisoLONE acetate (PRED FORTE) 1 % ophthalmic suspension Place 1 drop Into the left eye daily      propafenone (RYTHMOL) 150 MG TABS tablet TAKE 1 TABLET BY MOUTH TWICE A  tablet 1    timolol maleate (TIMOPTIC) 0.25 % ophthalmic solution Place 1 drop Into the left eye 2 times daily         Cardiographics:    ECG: Read by me 7/8/2024 sinus bradycardia normal QRS duration no ischemic changes    Event monitor: August 2022  Borderline multi day patient activated monitor with possible association of ectopy with the patient's symptoms.  There was no sustained arrhythmia or significant conduction abnormality.  The presence of junctional beats could signify either some intrinsic sinus node dysfunction or possible enhanced vagal tone.  No sustained atrial or ventricular tachyarrhythmia.  No profound bradycardia or significant pauses.    CT coronary angio: January 2022  1.  Total coronary calcium score 35 in mid LAD places the individual in the 50th percentile when compared to an age and gender matched control group and implies a moderate risk of cardiac events in the next ten years.  2.  Normal left main.  3.  Mild disease mid LAD about 20-30% stenosis.  The rest LAD and diagonal branches are normal.  4.  Normal LCx and RCA.  Right  "dominant system.  5.  Normal size of visualized aortic segment.  6.  No thrombus in left atrial appendage.  7.  No pericardial effusion.   Lab Results    Chemistry/lipid CBC Cardiac Enzymes/BNP/TSH/INR   Recent Labs   Lab Test 05/20/24  1536   CHOL 189   HDL 98   LDL 79   TRIG 60     Recent Labs   Lab Test 05/20/24  1536 05/17/23  1421 05/12/22  0935   LDL 79 86 113     Recent Labs   Lab Test 05/20/24  1536      POTASSIUM 4.1   CHLORIDE 106   CO2 22   GLC 84   BUN 21.7   CR 0.88   GFRESTIMATED 66   MILADYS 9.7     Recent Labs   Lab Test 05/20/24  1536 05/17/23  1421 07/27/22  1103   CR 0.88 1.02* 1.13*     Recent Labs   Lab Test 05/20/24  1536   A1C 4.9          Recent Labs   Lab Test 05/20/24  1536 05/17/23  1421   WBC  --  4.1   HGB 13.6 13.0   HCT  --  40.8   MCV  --  93   PLT  --  175     Recent Labs   Lab Test 05/20/24  1536 05/17/23  1421 03/14/23  0749   HGB 13.6 13.0 13.4    Recent Labs   Lab Test 09/04/21  0035   TROPONINI <0.01     No results for input(s): \"BNP\", \"NTBNPI\", \"NTBNP\" in the last 65805 hours.  Recent Labs   Lab Test 05/20/24  1536   TSH 3.29     Recent Labs   Lab Test 09/03/21  1708 03/21/19  0711   INR 0.97 0.93                         Thank you for allowing me to participate in the care of your patient.      Sincerely,     Jude Escalona MD     [facility]  cc:   Branden Escalona MD  [address]  [address]  [place] [address]      "

## 2024-07-08 NOTE — PATIENT INSTRUCTIONS
Alexandra Menezes,    It was a pleasure to see you today at the [facility].     My recommendations after this visit include:    Stop lisinopril  Heart rhythm monitor, echocardiogram and CT of the heart to assure the safety of propafenone and to make sure that you not prone to fainting in the future    SHAHAB Escalona MD, FACC, [facility]

## 2024-07-12 ENCOUNTER — HOSPITAL ENCOUNTER (OUTPATIENT)
Dept: CARDIOLOGY | Facility: HOSPITAL | Age: 84
Discharge: HOME | End: 2024-07-12
Attending: INTERNAL MEDICINE | Admitting: INTERNAL MEDICINE
Payer: COMMERCIAL

## 2024-07-12 DIAGNOSIS — R55 SYNCOPE, UNSPECIFIED SYNCOPE TYPE: ICD-10-CM

## 2024-07-12 LAB — LVEF ECHO: NORMAL

## 2024-07-12 PROCEDURE — 93306 TTE W/DOPPLER COMPLETE: CPT

## 2024-07-12 PROCEDURE — 93306 TTE W/DOPPLER COMPLETE: CPT | Mod: 26 | Performed by: INTERNAL MEDICINE

## 2024-07-26 ENCOUNTER — PATIENT OUTREACH (OUTPATIENT)
Dept: CARE COORDINATION | Facility: CLINIC | Age: 84
End: 2024-07-26
Payer: COMMERCIAL

## 2024-07-29 ENCOUNTER — TELEPHONE (OUTPATIENT)
Dept: FAMILY MEDICINE | Facility: CLINIC | Age: 84
End: 2024-07-29

## 2024-07-29 DIAGNOSIS — Z85.3 HISTORY OF BREAST CANCER: Primary | ICD-10-CM

## 2024-07-29 PROCEDURE — 93244 EXT ECG>48HR<7D REV&INTERPJ: CPT | Performed by: INTERNAL MEDICINE

## 2024-07-29 NOTE — TELEPHONE ENCOUNTER
Order/Referral Request    Who is requesting: Patient    Orders being requested: Replacement bras prescription     Reason service is needed/diagnosis: Yearly replacement of mastectomy bras    When are orders needed by: ASAP    Has this been discussed with Provider: Yes    Does patient have a preference on a Group/Provider/Facility? [facility] in [place] fax : [phone]    Does patient have an appointment scheduled?: No    Where to send orders:  Patient would like the script faxed to the above facility.    Could we send this information to you in [place] or would you prefer to receive a phone call?:   Patient would prefer a phone call   Okay to leave a detailed message?: Yes at Cell number on file:    Telephone Information:   Mobile [phone]

## 2024-08-13 RX ORDER — METOPROLOL TARTRATE 25 MG/1
25-100 TABLET, FILM COATED ORAL
Status: DISCONTINUED | OUTPATIENT
Start: 2024-08-13 | End: 2024-08-22 | Stop reason: HOSPADM

## 2024-08-13 RX ORDER — METOPROLOL TARTRATE 1 MG/ML
5-15 INJECTION, SOLUTION INTRAVENOUS
Status: DISCONTINUED | OUTPATIENT
Start: 2024-08-13 | End: 2024-08-22 | Stop reason: HOSPADM

## 2024-08-13 RX ORDER — DILTIAZEM HYDROCHLORIDE 30 MG/1
120 TABLET, FILM COATED ORAL
Status: DISCONTINUED | OUTPATIENT
Start: 2024-08-13 | End: 2024-08-22 | Stop reason: HOSPADM

## 2024-08-13 RX ORDER — NITROGLYCERIN 0.4 MG/1
0.4 TABLET SUBLINGUAL
Status: DISCONTINUED | OUTPATIENT
Start: 2024-08-13 | End: 2024-08-22 | Stop reason: HOSPADM

## 2024-08-13 RX ORDER — DILTIAZEM HYDROCHLORIDE 5 MG/ML
10-15 INJECTION INTRAVENOUS
Status: DISCONTINUED | OUTPATIENT
Start: 2024-08-13 | End: 2024-08-22 | Stop reason: HOSPADM

## 2024-08-19 ENCOUNTER — TELEPHONE (OUTPATIENT)
Dept: CARDIOLOGY | Facility: CLINIC | Age: 84
End: 2024-08-19
Payer: COMMERCIAL

## 2024-08-21 ENCOUNTER — HOSPITAL ENCOUNTER (OUTPATIENT)
Dept: CT IMAGING | Facility: CLINIC | Age: 84
Discharge: HOME | End: 2024-08-21
Attending: INTERNAL MEDICINE | Admitting: INTERNAL MEDICINE
Payer: COMMERCIAL

## 2024-08-21 VITALS — SYSTOLIC BLOOD PRESSURE: 119 MMHG | HEART RATE: 65 BPM | DIASTOLIC BLOOD PRESSURE: 56 MMHG

## 2024-08-21 DIAGNOSIS — I25.119 CORONARY ARTERY DISEASE INVOLVING NATIVE CORONARY ARTERY OF NATIVE HEART WITH ANGINA PECTORIS (H): ICD-10-CM

## 2024-08-21 LAB
BSA FOR ECHO PROCEDURE: 0 M2
CREAT BLD-MCNC: 1 MG/DL (ref 0.6–1.1)
EGFRCR SERPLBLD CKD-EPI 2021: 56 ML/MIN/1.73M2

## 2024-08-21 PROCEDURE — 75574 CT ANGIO HRT W/3D IMAGE: CPT

## 2024-08-21 PROCEDURE — 250N000013 HC RX MED GY IP 250 OP 250 PS 637: Performed by: INTERNAL MEDICINE

## 2024-08-21 PROCEDURE — 82565 ASSAY OF CREATININE: CPT

## 2024-08-21 PROCEDURE — 250N000011 HC RX IP 250 OP 636: Performed by: INTERNAL MEDICINE

## 2024-08-21 PROCEDURE — 75574 CT ANGIO HRT W/3D IMAGE: CPT | Mod: 26 | Performed by: INTERNAL MEDICINE

## 2024-08-21 RX ORDER — IOPAMIDOL 755 MG/ML
100 INJECTION, SOLUTION INTRAVASCULAR ONCE
Status: COMPLETED | OUTPATIENT
Start: 2024-08-21 | End: 2024-08-21

## 2024-08-21 RX ADMIN — IOPAMIDOL 100 ML: 755 INJECTION, SOLUTION INTRAVENOUS at 08:35

## 2024-08-21 RX ADMIN — NITROGLYCERIN 0.4 MG: 0.4 TABLET SUBLINGUAL at 08:32

## 2024-09-11 DIAGNOSIS — I49.3 FREQUENT UNIFOCAL PREMATURE VENTRICULAR CONTRACTIONS: ICD-10-CM

## 2024-09-11 RX ORDER — PROPAFENONE HYDROCHLORIDE 150 MG/1
150 TABLET, COATED ORAL 2 TIMES DAILY
Qty: 180 TABLET | Refills: 2 | Status: SHIPPED | OUTPATIENT
Start: 2024-09-11

## 2024-09-22 DIAGNOSIS — E03.9 HYPOTHYROIDISM: ICD-10-CM

## 2024-09-23 RX ORDER — LEVOTHYROXINE SODIUM 25 UG/1
TABLET ORAL
Qty: 90 TABLET | Refills: 1 | Status: SHIPPED | OUTPATIENT
Start: 2024-09-23

## 2024-11-05 NOTE — PROGRESS NOTES
ASSESSMENT & PLAN     Today we discussed the underlying etiology/pathology of patient's   1. Primary osteoarthritis of left hip    2. Localized osteoarthritis of lumbar spine    3. Hx of total knee replacement, left- [place] appox. 2021    4. Stage 3a chronic kidney disease (H)    5. Osteopenia- repeat DEXA in 11/2024    6. Hx of fracture of humerus- left arm, 2021 treated non-op    7. Numbness and tingling of legs    8. 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 A shared decision making model was used. The patient's values and choices were respected. The following represents what was discussed and decided upon by the provider and the patient.   - We discussed the patient is dealing with chronic left hemipelvic pain more so in the groin.  She has loss of range of motion and function.  This is all consistent with underlying osteoarthritis.  Her previous x-rays show grade 4 bone-on-bone changes in the superior aspect of the joint.  - We did discuss the patient also is dealing with intermittent tingling of both lower extremities.  This is much more likely to be related to some back issues.  She has never had her back formally evaluated.  X-rays were offered today but declined.  We discussed that advanced imaging/MRI could be of benefit to see if she has nerve impingement but this is being declined at this time.  Physical therapy for back strengthening program offered but declined.  - We discussed the etiology of osteoarthritis as well as various treatment options for the mainstay of management of osteoarthritis including rest, activity modification, proper technique with ambulation of steps/stairs which is to lead with good leg going up and lead with painful/symptomatic leg going down,  maintaining healthy weight management due to the 1:4 rule for joint stress with excess weight, herbal supplementation such as turmeric to decrease inflammation , healthy nutrition,  use of oral NSAIDs (if not contraindicated due to patient being on chronic blood thinner medication or other medical conditions- kidney disease) with supplemental Tylenol up to 3000 mg daily, topical pain relievers such as Voltaren 1% gel to be applied 3 times a day to the area of pain, consideration for intra-articular cortisone injections, viscosupplementation or PRP injections, formal physical therapy for joint mobilization and strengthening and possible surgical consideration if all conservative treatments fail.   -Patient already has a surgical consult with Dr. Arroyo at [facility] on 12/9/2024.  We discussed indications for possible intra-articular cortisone injection to help decrease pain but patient understood that this would be temporary pain control and would not restore her range of motion of the hip.  At this time she is dealing with pain as well as loss of motion and independence.  We discussed that we would delay any injection of the hip at this time as this would delay surgical procedure for at least 3-4 months from injection date and she already has an appointment with a surgeon in 1 month.  - Patient provided information in regards to total hip arthroplasty and we had a thorough discussion in regards to surgical procedure, expectations and outcomes.  Patient may review information on her AVS today in regards to hip surgery in preparation with her meeting with Dr. Arroyo at [facility].  - Patient otherwise may follow-up as needed.    -Call direct clinic number [[phone]] at any time with questions or concerns in regards to your recent office visit with me.     Aakash Adkins PA-C  [facility]    This note was completed in part using a voice recognition software, any grammatical or context distortion are unintentional and inherent to the software.     SUBJECTIVE  Alexandra Menezes is a/an 84 year old female who is seen for follow up of left hip pain. The  patient is seen by themselves.   Since last visit on 6/19/24 patient has been taking acetaminophen 3 times daily and has been avoiding certain activities.  She still tries to stay active with water aerobics but is having a more difficult time doing that due to her left hemipelvic pain.  Patient does have a cane available as well as a walking stick but chooses not to use them.  She does complain of some intermittent tingling affecting both lower extremities as well as a fear of falling.  She does have a history of proximal humerus fracture in the past secondary to fall.  Patient does have chronic grade 3 renal disease.  Original Date of Injury: Chronic     Has previous treatment plan been beneficial for condition: No    Location of Pain: left groin, lateral, and posterior hip   Rating of Pain at worst: 9/10  Rating of Pain Currently: 6/10  Worsened by: Going from sit to stand and standing for long periods of time   Better with: Acetaminophen  Treatments tried: rest/activity avoidance and acetaminophen  Quality: aching, sharp  Associated symptoms: tingling and feeling of instability    Previous plan of care from 06/19/2024:  -We discussed the patient is dealing with some progressive left hemipelvic pain related to progressive osteoarthritis documented on x-ray today which shows near bone-on-bone findings in the superior lateral aspect of the hip joint without fracture  - We discussed the etiology of osteoarthritis as well as various treatment options for the mainstay of management of osteoarthritis including rest, activity modification, proper technique with ambulation of steps/stairs which is to lead with good leg going up and lead with painful/symptomatic leg going down,  maintaining healthy weight management due to the 1:4 rule for joint stress with excess weight, herbal supplementation such as turmeric to decrease inflammation , healthy nutrition, use of oral NSAIDs (if not contraindicated due to patient being on  chronic blood thinner medication or other medical conditions) with supplemental Tylenol up to 3000 mg daily, topical pain relievers such as Voltaren 1% gel to be applied 3 times a day to the area of pain, consideration for intra-articular cortisone injections or PRP injections, formal physical therapy for joint mobilization and strengthening and possible surgical consideration if all conservative treatments fail.   -Patient does have a history of some renal dysfunction which has improved over the last year.  She was counseled to avoid NSAIDs as this will cause further renal injury.  Patient should focus on taking Tylenol up to 3000 mg daily for pain control which should not affect her kidney function nor increase her cardiovascular risk factors.  - Patient does have a history of falls with a recent fall affecting her right upper extremity.  She also fell resulting in a significant two-part humerus fracture on the left arm in 2021.  Physical therapy offered for left hip hemipelvic strengthening as well as gait training but this is being declined.  - Patient will modify her activities as most of her symptoms are aggravated with hip motion, especially abduction and pool therapy.  - We discussed her previous diagnosis of osteopenia and patient is having an updated DEXA scan in November.  Patient should reengage with her primary care provider about overall bone health and recommendations for improving bone density  - We discussed indications for intra-articular cortisone injection.  Patient is not symptomatic enough at this time to want to pursue injection therapy.  -If patient symptoms progressively worsen I be happy to see her back for reengagement, reassessment and discussion of further treatment options.  -Patient on x-ray does have significant lumbar spine disease with degenerative curvature to the right.  This likely is partially contributing to some chronic low back pain issues.  She describes intermittent  tingling of her legs.  At this time I cannot rule out possible lumbar radiculopathy but a majority of her symptoms in regards to her left hemipelvic pain is related to her underlying progressive osteoarthritis on exam.  If patient wishes to have this evaluated further in the future she can make an appointment with myself or any of the nonop sports providers to have her lumbar spine more thoroughly evaluated.      Past Medical History:   Diagnosis Date    Arrhythmia     take propafenone    Arthritis     Breast cancer (H) 1992    right Mastectomy    CKD (chronic kidney disease) stage 3, GFR 30-59 ml/min (H) 3/21/2019    Disease of thyroid gland     hypothyroid    Hypercholesterolemia     Hypertension     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    Sinus bradycardia     frequent PVC's    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     Social History     Socioeconomic History    Marital status:    Tobacco Use    Smoking status: Never    Smokeless tobacco: Never   Vaping Use    Vaping status: Never Used   Substance and Sexual Activity    Alcohol use: Yes     Alcohol/week: 2.0 standard drinks of alcohol     Comment: occ    Drug use: No    Sexual activity: Never     Social Drivers of Health     Financial Resource Strain: Low Risk  (5/14/2024)    Financial Resource Strain     Within the past 12 months, have you or your family members you live with been unable to get utilities (heat, electricity) when it was really needed?: No   Food Insecurity: Low Risk  (5/14/2024)    Food Insecurity     Within the past 12 months, did you worry that your food would run out before you got money to buy more?: No     Within the past 12 months, did the food you bought just not last and you didn t have money to get more?: No   Transportation Needs: Low Risk  (5/14/2024)    Transportation Needs     Within the past 12 months, has lack of transportation kept you from medical appointments, getting your medicines, non-medical  meetings or appointments, work, or from getting things that you need?: No   Physical Activity: Sufficiently Active (5/14/2024)    Exercise Vital Sign     Days of Exercise per Week: 5 days     Minutes of Exercise per Session: 40 min   Stress: No Stress Concern Present (5/14/2024)    [employer] [employer] - Occupational Stress Questionnaire     Feeling of Stress : Only a little   Social Connections: Unknown (5/14/2024)    Social Connection and Isolation Panel [NHANES]     Frequency of Social Gatherings with Friends and Family: More than three times a week   Interpersonal Safety: Low Risk  (5/20/2024)    Interpersonal Safety     Do you feel physically and emotionally safe where you currently live?: Yes     Within the past 12 months, have you been hit, slapped, kicked or otherwise physically hurt by someone?: No     Within the past 12 months, have you been humiliated or emotionally abused in other ways by your partner or ex-partner?: No   Housing Stability: Low Risk  (5/14/2024)    Housing Stability     Do you have housing? : Yes     Are you worried about losing your housing?: No         Patient's past medical, surgical, social, and family histories were personally reviewed today and no changes are noted.  REVIEW OF SYSTEMS:  Review of Systems    OBJECTIVE:  Vital signs as noted in EPIC for 11/5/2024    General: healthy, alert and in no distress  HEENT: no scleral icterus or conjunctival erythema  Skin: no suspicious lesions or rash. No jaundice.  CV: no pedal edema  Resp: normal respiratory effort without conversational dyspnea   Psych: normal mood and affect  Neuro: Normal light sensory exam of lower extremity      MSK:  Exam shows a pleasant 84-year-old female ambulates full weightbearing without assistive device.  She does not pick her feet up off the floor very high.  She has a forward flexed spine position consistent with low back degenerative disc disease.  She is alert and oriented x 3.   Negative Trendelenburg gait.  Examination of both lower extremities with skin exposed shows previous surgical incision over the left knee which is healed consistent with total knee arthroplasty.  Patient has decreased internal rotation of left hip reproducing left hemipelvic pain which is exasperated by flexing her knee with internal rotation again causing hemipelvic pain in the groin and lateral hip/buttock region.  Resisted hip flexion generates anterior groin and hemipelvic pain.  5 out of 5 motor tone still noted bilaterally.  Grossly she is neurovascular intact L2-S1 bilaterally.  Crossing her legs causes compression of the fibular nerve at the lateral knee causing some numbness and tingling below the knees into the feet.  Patient can stand unilaterally on each leg with acceptable balance but increased symptoms on the left hemipelvis/lateral hip of the hip abductors.  She is mildly tender on the greater trochanter and hip abductor gluteal tissues on the left.  Nontender throughout the spinous processes of the lumbar column and paraspinous muscles of the lumbar column.  She does have degenerative curve towards the right in the lumbar column noted.  Patient is able to forward flex and get her fingertips to about mid shin level with some generalized pulling in the back of the legs but no radicular findings.  Lumbar extension is limited causing increased low back pain as well as increased symptoms on her left hip with lateral bending to the right.  Straight leg raises negative for radicular component.           Personal Independent visualization of the below images done today:  Previous x-rays the patient's pelvis and left hip shows grade 4 bone-on-bone changes on the superior aspect of the left hip.  No evidence of acute fracture.  Patient does have degenerative changes of the lumbar spine harshly visualized with degenerative curvature and osteophyte formation.    Order  XR Pelvis and Hip Left 1 View [[id number]]  (Order [id number])  Exam Information    Exam Date Exam Time Exam Date Exam Time Accession # Performing Department Results    6/19/24  1:43 PM 6/19/24  1:43 PM [id number] [facility]      PACS Images     Show images for XR Pelvis and Hip Left 1 View     Study Result    Narrative & Impression   EXAM: XR PELVIS AND HIP LEFT 1 VIEW  LOCATION: [facility]  DATE: 6/19/2024     INDICATION:  Left hip pain  COMPARISON: None.                                                                      IMPRESSION: No acute fracture or malalignment. Severe degenerative changes of the left hip and lower lumbar spine. Mild right hip joint degenerative changes. Moderate bilateral sacroiliac joint degenerative changes. Osteopenia.     Patient's conditions were thoroughly discussed during today's visit with total time reviewing patient's previous medical records/history/radiology, face-to-face examination and discussion and plan of care with the patient and documentation being 35 minutes for today's clinical visit    Aakash Adkins PA-C  [facility]    This note was completed in part using a voice recognition software, any grammatical or context distortion are unintentional and inherent to the software.

## 2024-11-06 ENCOUNTER — OFFICE VISIT (OUTPATIENT)
Dept: ORTHOPEDICS | Facility: CLINIC | Age: 84
End: 2024-11-06
Payer: COMMERCIAL

## 2024-11-06 VITALS — WEIGHT: 160 LBS | BODY MASS INDEX: 27.31 KG/M2 | HEIGHT: 64 IN

## 2024-11-06 DIAGNOSIS — Z78.0 OSTEOPENIA AFTER MENOPAUSE: ICD-10-CM

## 2024-11-06 DIAGNOSIS — M47.816 LOCALIZED OSTEOARTHRITIS OF LUMBAR SPINE: ICD-10-CM

## 2024-11-06 DIAGNOSIS — M85.80 OSTEOPENIA AFTER MENOPAUSE: ICD-10-CM

## 2024-11-06 DIAGNOSIS — M16.12 PRIMARY OSTEOARTHRITIS OF LEFT HIP: Primary | ICD-10-CM

## 2024-11-06 DIAGNOSIS — N18.31 STAGE 3A CHRONIC KIDNEY DISEASE (H): ICD-10-CM

## 2024-11-06 DIAGNOSIS — M85.80 OSTEOPENIA, UNSPECIFIED LOCATION: ICD-10-CM

## 2024-11-06 DIAGNOSIS — Z87.81 HX OF FRACTURE OF HUMERUS: ICD-10-CM

## 2024-11-06 DIAGNOSIS — Z96.652 HX OF TOTAL KNEE REPLACEMENT, LEFT: ICD-10-CM

## 2024-11-06 DIAGNOSIS — R20.0 NUMBNESS AND TINGLING OF LEG: ICD-10-CM

## 2024-11-06 DIAGNOSIS — R20.2 NUMBNESS AND TINGLING OF LEG: ICD-10-CM

## 2024-11-06 PROCEDURE — 99214 OFFICE O/P EST MOD 30 MIN: CPT | Performed by: PHYSICIAN ASSISTANT

## 2024-11-06 NOTE — PATIENT INSTRUCTIONS
Today we discussed the underlying etiology/pathology of patient's   1. Primary osteoarthritis of left hip    2. Localized osteoarthritis of lumbar spine    3. Hx of total knee replacement, left- [place] appox. 2021    4. Stage 3a chronic kidney disease (H)    5. Osteopenia- repeat DEXA in 11/2024    6. Hx of fracture of humerus- left arm, 2021 treated non-op    7. Numbness and tingling of legs    8. 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 A shared decision making model was used. The patient's values and choices were respected. The following represents what was discussed and decided upon by the provider and the patient.   - We discussed the patient is dealing with chronic left hemipelvic pain more so in the groin.  She has loss of range of motion and function.  This is all consistent with underlying osteoarthritis.  Her previous x-rays show grade 4 bone-on-bone changes in the superior aspect of the joint.  - We did discuss the patient also is dealing with intermittent tingling of both lower extremities.  This is much more likely to be related to some back issues.  She has never had her back formally evaluated.  X-rays were offered today but declined.  We discussed that advanced imaging/MRI could be of benefit to see if she has nerve impingement but this is being declined at this time.  Physical therapy for back strengthening program offered but declined.  - We discussed the etiology of osteoarthritis as well as various treatment options for the mainstay of management of osteoarthritis including rest, activity modification, proper technique with ambulation of steps/stairs which is to lead with good leg going up and lead with painful/symptomatic leg going down,  maintaining healthy weight management due to the 1:4 rule for joint stress with excess weight, herbal supplementation such as turmeric to decrease inflammation , healthy nutrition, use of oral  NSAIDs (if not contraindicated due to patient being on chronic blood thinner medication or other medical conditions- kidney disease) with supplemental Tylenol up to 3000 mg daily, topical pain relievers such as Voltaren 1% gel to be applied 3 times a day to the area of pain, consideration for intra-articular cortisone injections, viscosupplementation or PRP injections, formal physical therapy for joint mobilization and strengthening and possible surgical consideration if all conservative treatments fail.   -Patient already has a surgical consult with Dr. Arroyo at [facility] on 12/9/2024.  We discussed indications for possible intra-articular cortisone injection to help decrease pain but patient understood that this would be temporary pain control and would not restore her range of motion of the hip.  At this time she is dealing with pain as well as loss of motion and independence.  We discussed that we would delay any injection of the hip at this time as this would delay surgical procedure for at least 3-4 months from injection date and she already has an appointment with a surgeon in 1 month.  - Patient provided information in regards to total hip arthroplasty and we had a thorough discussion in regards to surgical procedure, expectations and outcomes.  Patient may review information on her AVS today in regards to hip surgery in preparation with her meeting with Dr. Arroyo at [facility].  - Patient otherwise may follow-up as needed.    -Call direct clinic number [[phone]] at any time with questions or concerns in regards to your recent office visit with me.     Aakash Adkins PA-C  [facility]    This note was completed in part using a voice recognition software, any grammatical or context distortion are unintentional and inherent to the software.

## 2024-11-06 NOTE — LETTER
11/6/2024      Alexandra Menezes  [address] [place] [address]      Dear Colleague,    Thank you for referring your patient, Alexandra Menezes, to the [facility]. Please see a copy of my visit note below.    ASSESSMENT & PLAN     Today we discussed the underlying etiology/pathology of patient's   1. Primary osteoarthritis of left hip    2. Localized osteoarthritis of lumbar spine    3. Hx of total knee replacement, left- [place] appox. 2021    4. Stage 3a chronic kidney disease (H)    5. Osteopenia- repeat DEXA in 11/2024    6. Hx of fracture of humerus- left arm, 2021 treated non-op    7. Numbness and tingling of legs    8. 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 A shared decision making model was used. The patient's values and choices were respected. The following represents what was discussed and decided upon by the provider and the patient.   - We discussed the patient is dealing with chronic left hemipelvic pain more so in the groin.  She has loss of range of motion and function.  This is all consistent with underlying osteoarthritis.  Her previous x-rays show grade 4 bone-on-bone changes in the superior aspect of the joint.  - We did discuss the patient also is dealing with intermittent tingling of both lower extremities.  This is much more likely to be related to some back issues.  She has never had her back formally evaluated.  X-rays were offered today but declined.  We discussed that advanced imaging/MRI could be of benefit to see if she has nerve impingement but this is being declined at this time.  Physical therapy for back strengthening program offered but declined.  - We discussed the etiology of osteoarthritis as well as various treatment options for the mainstay of management of osteoarthritis including rest, activity modification, proper technique with ambulation of steps/stairs  which is to lead with good leg going up and lead with painful/symptomatic leg going down,  maintaining healthy weight management due to the 1:4 rule for joint stress with excess weight, herbal supplementation such as turmeric to decrease inflammation , healthy nutrition, use of oral NSAIDs (if not contraindicated due to patient being on chronic blood thinner medication or other medical conditions- kidney disease) with supplemental Tylenol up to 3000 mg daily, topical pain relievers such as Voltaren 1% gel to be applied 3 times a day to the area of pain, consideration for intra-articular cortisone injections, viscosupplementation or PRP injections, formal physical therapy for joint mobilization and strengthening and possible surgical consideration if all conservative treatments fail.   -Patient already has a surgical consult with Dr. Arroyo at [facility] on 12/9/2024.  We discussed indications for possible intra-articular cortisone injection to help decrease pain but patient understood that this would be temporary pain control and would not restore her range of motion of the hip.  At this time she is dealing with pain as well as loss of motion and independence.  We discussed that we would delay any injection of the hip at this time as this would delay surgical procedure for at least 3-4 months from injection date and she already has an appointment with a surgeon in 1 month.  - Patient provided information in regards to total hip arthroplasty and we had a thorough discussion in regards to surgical procedure, expectations and outcomes.  Patient may review information on her AVS today in regards to hip surgery in preparation with her meeting with Dr. Arroyo at [facility].  - Patient otherwise may follow-up as needed.    -Call direct clinic number [[phone]] at any time with questions or concerns in regards to your recent office visit with me.     Aakash Adkins PA-C  [facility]    This note was completed in part using a voice recognition software, any grammatical or context distortion are unintentional and inherent to the software.     SUBJECTIVE  Alexandra Menezes is a/an 84 year old female who is seen for follow up of left hip pain. The patient is seen by themselves.   Since last visit on 6/19/24 patient has been taking acetaminophen 3 times daily and has been avoiding certain activities.  She still tries to stay active with water aerobics but is having a more difficult time doing that due to her left hemipelvic pain.  Patient does have a cane available as well as a walking stick but chooses not to use them.  She does complain of some intermittent tingling affecting both lower extremities as well as a fear of falling.  She does have a history of proximal humerus fracture in the past secondary to fall.  Patient does have chronic grade 3 renal disease.  Original Date of Injury: Chronic     Has previous treatment plan been beneficial for condition: No    Location of Pain: left groin, lateral, and posterior hip   Rating of Pain at worst: 9/10  Rating of Pain Currently: 6/10  Worsened by: Going from sit to stand and standing for long periods of time   Better with: Acetaminophen  Treatments tried: rest/activity avoidance and acetaminophen  Quality: aching, sharp  Associated symptoms: tingling and feeling of instability      Past Medical History:   Diagnosis Date     Arrhythmia     take propafenone     Arthritis      Breast cancer (H) 1992    right Mastectomy     CKD (chronic kidney disease) stage 3, GFR 30-59 ml/min (H) 3/21/2019     Disease of thyroid gland     hypothyroid     Hypercholesterolemia      Hypertension      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     Sinus bradycardia     frequent PVC's     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     Social History     Socioeconomic History     Marital status:    Tobacco Use     Smoking status: Never      Smokeless tobacco: Never   Vaping Use     Vaping status: Never Used   Substance and Sexual Activity     Alcohol use: Yes     Alcohol/week: 2.0 standard drinks of alcohol     Comment: occ     Drug use: No     Sexual activity: Never     Social Drivers of Health     Financial Resource Strain: Low Risk  (5/14/2024)    Financial Resource Strain      Within the past 12 months, have you or your family members you live with been unable to get utilities (heat, electricity) when it was really needed?: No   Food Insecurity: Low Risk  (5/14/2024)    Food Insecurity      Within the past 12 months, did you worry that your food would run out before you got money to buy more?: No      Within the past 12 months, did the food you bought just not last and you didn t have money to get more?: No   Transportation Needs: Low Risk  (5/14/2024)    Transportation Needs      Within the past 12 months, has lack of transportation kept you from medical appointments, getting your medicines, non-medical meetings or appointments, work, or from getting things that you need?: No   Physical Activity: Sufficiently Active (5/14/2024)    Exercise Vital Sign      Days of Exercise per Week: 5 days      Minutes of Exercise per Session: 40 min   Stress: No Stress Concern Present (5/14/2024)    [employer] - Occupational Stress Questionnaire      Feeling of Stress : Only a little   Social Connections: Unknown (5/14/2024)    Social Connection and Isolation Panel [NHANES]      Frequency of Social Gatherings with Friends and Family: More than three times a week   Interpersonal Safety: Low Risk  (5/20/2024)    Interpersonal Safety      Do you feel physically and emotionally safe where you currently live?: Yes      Within the past 12 months, have you been hit, slapped, kicked or otherwise physically hurt by someone?: No      Within the past 12 months, have you been humiliated or emotionally abused in other ways by your partner or  ex-partner?: No   Housing Stability: Low Risk  (5/14/2024)    Housing Stability      Do you have housing? : Yes      Are you worried about losing your housing?: No         Patient's past medical, surgical, social, and family histories were personally reviewed today and no changes are noted.  REVIEW OF SYSTEMS:  Review of Systems    OBJECTIVE:  Vital signs as noted in EPIC for 11/5/2024    General: healthy, alert and in no distress  HEENT: no scleral icterus or conjunctival erythema  Skin: no suspicious lesions or rash. No jaundice.  CV: no pedal edema  Resp: normal respiratory effort without conversational dyspnea   Psych: normal mood and affect  Neuro: Normal light sensory exam of lower extremity      MSK:  Exam shows a pleasant 84-year-old female ambulates full weightbearing without assistive device.  She does not pick her feet up off the floor very high.  She has a forward flexed spine position consistent with low back degenerative disc disease.  She is alert and oriented x 3.  Negative Trendelenburg gait.  Examination of both lower extremities with skin exposed shows previous surgical incision over the left knee which is healed consistent with total knee arthroplasty.  Patient has decreased internal rotation of left hip reproducing left hemipelvic pain which is exasperated by flexing her knee with internal rotation again causing hemipelvic pain in the groin and lateral hip/buttock region.  Resisted hip flexion generates anterior groin and hemipelvic pain.  5 out of 5 motor tone still noted bilaterally.  Grossly she is neurovascular intact L2-S1 bilaterally.  Crossing her legs causes compression of the fibular nerve at the lateral knee causing some numbness and tingling below the knees into the feet.  Patient can stand unilaterally on each leg with acceptable balance but increased symptoms on the left hemipelvis/lateral hip of the hip abductors.  She is mildly tender on the greater trochanter and hip abductor  gluteal tissues on the left.  Nontender throughout the spinous processes of the lumbar column and paraspinous muscles of the lumbar column.  She does have degenerative curve towards the right in the lumbar column noted.  Patient is able to forward flex and get her fingertips to about mid shin level with some generalized pulling in the back of the legs but no radicular findings.  Lumbar extension is limited causing increased low back pain as well as increased symptoms on her left hip with lateral bending to the right.  Straight leg raises negative for radicular component.           Personal Independent visualization of the below images done today:  Previous x-rays the patient's pelvis and left hip shows grade 4 bone-on-bone changes on the superior aspect of the left hip.  No evidence of acute fracture.  Patient does have degenerative changes of the lumbar spine harshly visualized with degenerative curvature and osteophyte formation.    Order  XR Pelvis and Hip Left 1 View [[id number]] (Order [id number])  Exam Information    Exam Date Exam Time Exam Date Exam Time Accession # Performing Department Results    6/19/24  1:43 PM 6/19/24  1:43 PM [id number] [facility]      PACS Images     Show images for XR Pelvis and Hip Left 1 View     Study Result    Narrative & Impression   EXAM: XR PELVIS AND HIP LEFT 1 VIEW  LOCATION: [facility]  DATE: 6/19/2024     INDICATION:  Left hip pain  COMPARISON: None.                                                                      IMPRESSION: No acute fracture or malalignment. Severe degenerative changes of the left hip and lower lumbar spine. Mild right hip joint degenerative changes. Moderate bilateral sacroiliac joint degenerative changes. Osteopenia.     Patient's conditions were thoroughly discussed during today's visit with total time reviewing patient's previous medical records/history/radiology, face-to-face  examination and discussion and plan of care with the patient and documentation being 35 minutes for today's clinical visit    Aakash Adkins PA-C  [facility]    This note was completed in part using a voice recognition software, any grammatical or context distortion are unintentional and inherent to the software.        Again, thank you for allowing me to participate in the care of your patient.        Sincerely,        Aakash Adkins PA-C

## 2024-11-18 ENCOUNTER — HOSPITAL ENCOUNTER (OUTPATIENT)
Dept: MAMMOGRAPHY | Facility: CLINIC | Age: 84
Discharge: HOME | End: 2024-11-18
Attending: FAMILY MEDICINE | Admitting: FAMILY MEDICINE
Payer: COMMERCIAL

## 2024-11-18 ENCOUNTER — ANCILLARY PROCEDURE (OUTPATIENT)
Dept: BONE DENSITY | Facility: CLINIC | Age: 84
End: 2024-11-18
Attending: FAMILY MEDICINE
Payer: COMMERCIAL

## 2024-11-18 DIAGNOSIS — Z12.31 VISIT FOR SCREENING MAMMOGRAM: ICD-10-CM

## 2024-11-18 DIAGNOSIS — M85.80 OSTEOPENIA, UNSPECIFIED LOCATION: ICD-10-CM

## 2024-11-18 DIAGNOSIS — Z78.0 ASYMPTOMATIC POSTMENOPAUSAL STATUS: ICD-10-CM

## 2024-11-18 PROCEDURE — 77080 DXA BONE DENSITY AXIAL: CPT | Mod: TC | Performed by: PHYSICIAN ASSISTANT

## 2024-11-18 PROCEDURE — 77063 BREAST TOMOSYNTHESIS BI: CPT | Mod: 52

## 2024-11-18 PROCEDURE — 77081 DXA BONE DENSITY APPENDICULR: CPT | Mod: TC | Performed by: PHYSICIAN ASSISTANT

## 2024-11-18 PROCEDURE — 77091 TBS TECHL CALCULATION ONLY: CPT | Performed by: PHYSICIAN ASSISTANT

## 2024-11-26 ENCOUNTER — TELEPHONE (OUTPATIENT)
Dept: FAMILY MEDICINE | Facility: CLINIC | Age: 84
End: 2024-11-26
Payer: COMMERCIAL

## 2024-11-26 DIAGNOSIS — M85.832 OSTEOPENIA OF LEFT FOREARM: Primary | ICD-10-CM

## 2024-11-26 NOTE — TELEPHONE ENCOUNTER
"See result note from the PCP, to the patient, on 11/26/24:    ----- Message from Donna Buchanan sent at 11/26/2024  2:53 PM CST -----  \"The bone density of your hips and spine look good overall.  However, you have reduced bone density in your wrist.  I am not sure what to make of this.  This was noted at your last bone density scan 2, though it was not necessarily highlighted, and it looks like your wrist bone density has worsened.  I do not know if this is of concern.  I think it would be a good idea to have x-rays performed of your wrists to ensure the bones are otherwise healthy.  I will place orders.  If all looks good there, I think we can keep an eye on this.  Sometimes elevation in your parathyroid hormone level can contribute to this, and years has been higher in the past.  We will continue to follow that hormone level yearly, it looked good when last checked in May.\"    Writer called the patient and left a message to return call.    When the patient calls back, please route her to the RN queue to discuss the above result note.    Melanie Frazier, RN, BSN  [facility]    "

## 2024-11-27 NOTE — TELEPHONE ENCOUNTER
See result note from the PCP, to the patient, on 11/26/24.    Writer called the patient and relayed the above message to the patient, who verbalized understanding and will wait to schedule her x-rays after her appointment in December to check her hips.    Denies other questions or concerns at this time.    Melanie Frazier RN, BSN  [facility]

## 2024-12-02 ENCOUNTER — HOSPITAL ENCOUNTER (OUTPATIENT)
Dept: GENERAL RADIOLOGY | Facility: HOSPITAL | Age: 84
Discharge: HOME | End: 2024-12-02
Attending: FAMILY MEDICINE | Admitting: FAMILY MEDICINE
Payer: COMMERCIAL

## 2024-12-02 DIAGNOSIS — M85.832 OSTEOPENIA OF LEFT FOREARM: ICD-10-CM

## 2024-12-02 PROCEDURE — 73110 X-RAY EXAM OF WRIST: CPT | Mod: 50

## 2024-12-09 ENCOUNTER — TRANSFERRED RECORDS (OUTPATIENT)
Dept: HEALTH INFORMATION MANAGEMENT | Facility: CLINIC | Age: 84
End: 2024-12-09
Payer: COMMERCIAL

## 2025-02-04 PROBLEM — N18.30 CKD (CHRONIC KIDNEY DISEASE) STAGE 3, GFR 30-59 ML/MIN (H): Chronic | Status: ACTIVE | Noted: 2019-03-21

## 2025-02-04 PROBLEM — R55 SYNCOPE, UNSPECIFIED SYNCOPE TYPE: Status: RESOLVED | Noted: 2024-07-08 | Resolved: 2025-02-04

## 2025-02-05 ENCOUNTER — OFFICE VISIT (OUTPATIENT)
Dept: FAMILY MEDICINE | Facility: CLINIC | Age: 85
End: 2025-02-05
Payer: COMMERCIAL

## 2025-02-05 VITALS
SYSTOLIC BLOOD PRESSURE: 136 MMHG | BODY MASS INDEX: 27.14 KG/M2 | WEIGHT: 159 LBS | RESPIRATION RATE: 16 BRPM | HEIGHT: 64 IN | DIASTOLIC BLOOD PRESSURE: 82 MMHG | OXYGEN SATURATION: 98 % | TEMPERATURE: 97.5 F | HEART RATE: 64 BPM

## 2025-02-05 DIAGNOSIS — I48.91 ATRIAL FIBRILLATION, UNSPECIFIED TYPE (H): ICD-10-CM

## 2025-02-05 DIAGNOSIS — E03.9 HYPOTHYROIDISM, UNSPECIFIED TYPE: ICD-10-CM

## 2025-02-05 DIAGNOSIS — I10 ESSENTIAL HYPERTENSION: ICD-10-CM

## 2025-02-05 DIAGNOSIS — N18.31 STAGE 3A CHRONIC KIDNEY DISEASE (H): Chronic | ICD-10-CM

## 2025-02-05 DIAGNOSIS — M16.12 PRIMARY OSTEOARTHRITIS OF LEFT HIP: ICD-10-CM

## 2025-02-05 DIAGNOSIS — I25.10 CORONARY ARTERY DISEASE INVOLVING NATIVE CORONARY ARTERY OF NATIVE HEART WITHOUT ANGINA PECTORIS: ICD-10-CM

## 2025-02-05 DIAGNOSIS — E78.00 PURE HYPERCHOLESTEROLEMIA: ICD-10-CM

## 2025-02-05 DIAGNOSIS — Z01.818 PREOP GENERAL PHYSICAL EXAM: Primary | ICD-10-CM

## 2025-02-05 LAB
ATRIAL RATE - MUSE: 58 BPM
DIASTOLIC BLOOD PRESSURE - MUSE: NORMAL MMHG
ERYTHROCYTE [DISTWIDTH] IN BLOOD BY AUTOMATED COUNT: 13.4 % (ref 10–15)
HCT VFR BLD AUTO: 38.3 % (ref 35–47)
HGB BLD-MCNC: 12.2 G/DL (ref 11.7–15.7)
INTERPRETATION ECG - MUSE: NORMAL
MCH RBC QN AUTO: 30.3 PG (ref 26.5–33)
MCHC RBC AUTO-ENTMCNC: 31.9 G/DL (ref 31.5–36.5)
MCV RBC AUTO: 95 FL (ref 78–100)
P AXIS - MUSE: 72 DEGREES
PLATELET # BLD AUTO: 259 10E3/UL (ref 150–450)
PR INTERVAL - MUSE: 156 MS
QRS DURATION - MUSE: 94 MS
QT - MUSE: 438 MS
QTC - MUSE: 429 MS
R AXIS - MUSE: -37 DEGREES
RBC # BLD AUTO: 4.03 10E6/UL (ref 3.8–5.2)
SYSTOLIC BLOOD PRESSURE - MUSE: NORMAL MMHG
T AXIS - MUSE: 9 DEGREES
VENTRICULAR RATE- MUSE: 58 BPM
WBC # BLD AUTO: 4.8 10E3/UL (ref 4–11)

## 2025-02-05 PROCEDURE — 93005 ELECTROCARDIOGRAM TRACING: CPT | Performed by: FAMILY MEDICINE

## 2025-02-05 PROCEDURE — 36415 COLL VENOUS BLD VENIPUNCTURE: CPT | Performed by: FAMILY MEDICINE

## 2025-02-05 PROCEDURE — 90662 IIV NO PRSV INCREASED AG IM: CPT | Performed by: FAMILY MEDICINE

## 2025-02-05 PROCEDURE — 99214 OFFICE O/P EST MOD 30 MIN: CPT | Mod: 25 | Performed by: FAMILY MEDICINE

## 2025-02-05 PROCEDURE — G2211 COMPLEX E/M VISIT ADD ON: HCPCS | Performed by: FAMILY MEDICINE

## 2025-02-05 PROCEDURE — 85027 COMPLETE CBC AUTOMATED: CPT | Performed by: FAMILY MEDICINE

## 2025-02-05 PROCEDURE — 80048 BASIC METABOLIC PNL TOTAL CA: CPT | Performed by: FAMILY MEDICINE

## 2025-02-05 PROCEDURE — 93010 ELECTROCARDIOGRAM REPORT: CPT | Performed by: STUDENT IN AN ORGANIZED HEALTH CARE EDUCATION/TRAINING PROGRAM

## 2025-02-05 PROCEDURE — G0008 ADMIN INFLUENZA VIRUS VAC: HCPCS | Performed by: FAMILY MEDICINE

## 2025-02-05 ASSESSMENT — PAIN SCALES - GENERAL: PAINLEVEL_OUTOF10: MILD PAIN (2)

## 2025-02-05 NOTE — PATIENT INSTRUCTIONS
Stop ibuprofen, any vitamins, and any supplements 1 week prior to surgery.  Do not take aspirin or naproxen 1 week prior to surgery.  You may otherwise continue your usual medications including eyedrops before and after surgery as usual.

## 2025-02-05 NOTE — H&P (VIEW-ONLY)
Preoperative Evaluation  [facility]  [address]  [facility] [address]  Phone: [phone]  Fax: [phone]  Primary Provider: Donna Buchanan MD  Pre-op Performing Provider: Donna Buchanan MD  Feb 5, 2025 2/5/2025   Surgical Information   What procedure is being done? hip replacement   Facility or Hospital where procedure/surgery will be performed: [facility]   Who is doing the procedure / surgery? Leon Oliverloretta   Date of surgery / procedure: Feb 19 2025   Time of surgery / procedure: hip replacement time not yet scheduled   Where do you plan to recover after surgery? at home with family     Fax number for surgical facility: Note does not need to be faxed, will be available electronically in Epic.    Assessment & Plan     The proposed surgical procedure is considered INTERMEDIATE risk.    Preop general physical exam  Surgical risks include stable hypertension not requiring medications, chronic kidney disease stage IIIa with most recent GFR in the normal range, nonobstructive coronary artery disease, rhythm controlled atrial fibrillation, and medication controlled hypercholesterolemia.  At this time her health conditions are adequately managed but she may proceed with surgery as scheduled without further clinical clarification or evaluation and may proceed with choice of anesthesia.  We will check basic metabolic panel and CBC preoperatively.  - Basic metabolic panel; Future  - CBC with platelets; Future    Primary osteoarthritis of left hip  To proceed with surgical replacement as noted.    Stage 3a chronic kidney disease (H)  Minimize nephrotoxic agents.  Will check basic metabolic panel preoperatively.  Lisinopril on hold due to relative hypotension.  - Basic metabolic panel; Future    Essential hypertension  Stable and controlled without need for medication.  Should postoperative medication be needed, she has done well with lisinopril in the  past.    Atrial fibrillation, unspecified type (H)  Rate controlled on propafenone, has not required anticoagulation per cardiology.    Coronary artery disease involving native coronary artery of native heart without angina pectoris; mild and nonobstructive  This is nonobstructive and noted on imaging, not requiring aggressive treatments at this time.  EKG without concerning findings.  - EKG 12-lead, tracing only    Hypercholesterolemia  Stable and controlled on atorvastatin.  Continue.    Hypothyroidism, unspecified type  Stable and controlled on levothyroxine, continue.    The longitudinal plan of care for the diagnosis(es)/condition(s) as documented were addressed during this visit. Due to the added complexity in care, I will continue to support Alexandra in the subsequent management and with ongoing continuity of care.        - No identified additional risk factors other than previously addressed    Antiplatelet or Anticoagulation Medication Instructions   - Patient is on no antiplatelet or anticoagulation medications.    Additional Medication Instructions  Take all scheduled medications on the day of surgery    Recommendation  Approval given to proceed with proposed procedure, without further diagnostic evaluation.    Wen Morris is a 84 year old, presenting for the following:  Pre-Op Exam and Check spot on face         HPI related to upcoming procedure: Progressive pain and dysfunction due to left hip osteoarthritis, requiring surgical intervention.  Previous history of atrial fibrillation, this has been rhythm controlled with propafenone and per cardiology has not required anticoagulation.  Remains on atorvastatin for history of hypercholesterolemia.  She is been diagnosed with nonobstructive coronary artery disease, no prior history of ischemic heart disease, myocardial infarction, or angina.  History of chronic kidney disease stage IIIa with fluctuations in GFR as low as the 40s, most recently in the  normal range.  Previously taking lisinopril but this has been held due to relative hypotension, history of hypertension has not required medications currently.  Hypothyroidism has been stable on levothyroxine, most recent TSH normal at 3.295 2024.        2/5/2025   Pre-Op Questionnaire   Have you ever had a heart attack or stroke? No   Have you ever had surgery on your heart or blood vessels, such as a stent placement, a coronary artery bypass, or surgery on an artery in your head, neck, heart, or legs? No   Do you have chest pain with activity? No   Do you have a history of heart failure? No   Do you currently have a cold, bronchitis or symptoms of other infection? No   Do you have a cough, shortness of breath, or wheezing? No   Do you or anyone in your family have previous history of blood clots? No   Do you or does anyone in your family have a serious bleeding problem such as prolonged bleeding following surgeries or cuts? No   Have you ever had problems with anemia or been told to take iron pills? No   Have you had any abnormal blood loss such as black, tarry or bloody stools, or abnormal vaginal bleeding? No   Have you ever had a blood transfusion? No   Are you willing to have a blood transfusion if it is medically needed before, during, or after your surgery? Yes   Have you or any of your relatives ever had problems with anesthesia? No   Do you have sleep apnea, excessive snoring or daytime drowsiness? No   Do you have any artifical heart valves or other implanted medical devices like a pacemaker, defibrillator, or continuous glucose monitor? No   Do you have artificial joints? (!) YES   Are you allergic to latex? No     Health Care Directive  Patient does not have a Health Care Directive: Patient states has Advance Directive and will bring in a copy to clinic.    Preoperative Review of    reviewed - no record of controlled substances prescribed.          Patient Active Problem List    Diagnosis Date  Noted    Primary osteoarthritis of left hip 11/06/2024     Priority: Medium    Osteopenia, unspecified location 05/17/2023     Priority: Medium    Atrial fibrillation (H) 07/30/2022     Priority: Medium    Elevated parathyroid hormone 04/17/2022     Priority: Medium    Coronary artery disease involving native coronary artery of native heart without angina pectoris; mild and nonobstructive 01/28/2022     Priority: Medium     Mild LAD stenosis 20% December 2020 by CTA      Hypercholesterolemia      Priority: Medium     Created by Conversion        Hypothyroidism      Priority: Medium     Created by Conversion  Replacement Utility updated for latest IMO load        CKD (chronic kidney disease) stage 3, GFR 30-59 ml/min (H) 03/21/2019     Priority: Medium     Lisinopril held due to hypotension (per Dr. Escalona summer 2024)      Vitamin D deficiency 06/28/2018     Priority: Medium    Frequent unifocal premature ventricular contractions 01/10/2017     Priority: Medium    Essential hypertension 01/10/2017     Priority: Medium    Localized Osteoarthritis Of The Knee      Priority: Medium     Created by Conversion  Replacement Utility updated for latest IMO load        History of breast cancer 04/08/2015     Priority: Medium    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      Priority: Medium     Created by Conversion          Past Medical History:   Diagnosis Date    Arrhythmia     take propafenone    Arthritis     Breast cancer (H) 01/01/1992    right Mastectomy    CKD (chronic kidney disease) stage 3, GFR 30-59 ml/min (H) 03/21/2019    Coronary artery disease     Disease of thyroid gland     hypothyroid    Hypercholesterolemia     Hypertension     Multiple premature ventricular complexes 03/21/2019    Sinus bradycardia     frequent PVC's    Squamous cell cancer of skin of hand 06/01/2015     Past Surgical History:   Procedure Laterality Date    BREAST SURGERY Right 1992    Mastectomy for Ca     MASTECTOMY Right 1992    PHACOEMULSIFICATION CLEAR CORNEA WITH STANDARD INTRAOCULAR LENS IMPLANT Bilateral     REPLACEMENT TOTAL KNEE Left 3/21/2019    Procedure: LEFT TOTAL KNEE ARTHROPLASTY,COMPUTER-ASSIST;  Surgeon: Fede Robert MD;  Location: [facility];  Service: Orthopedics     Current Outpatient Medications   Medication Sig Dispense Refill    atorvastatin (LIPITOR) 20 MG tablet TAKE 1 TABLET BY MOUTH EVERY DAY 90 tablet 3    ketorolac (ACULAR) 0.5 % ophthalmic solution Place 1 drop Into the left eye 2 times daily      levothyroxine (SYNTHROID/LEVOTHROID) 25 MCG tablet TAKE 1 TABLET BY MOUTH DAILY AT 6 AM 90 tablet 1    omega-3 fatty acids-fish oil (OMEGA 3 FISH OIL) 684-1,200 mg CpDR [OMEGA-3 FATTY ACIDS-FISH OIL (OMEGA 3 FISH OIL) 684-1,200 MG CPDR] Take 2 tablets by mouth every morning.      prednisoLONE acetate (PRED FORTE) 1 % ophthalmic suspension Place 1 drop Into the left eye daily      propafenone (RYTHMOL) 150 MG TABS tablet Take 1 tablet (150 mg) by mouth 2 times daily. 180 tablet 2    timolol maleate (TIMOPTIC) 0.25 % ophthalmic solution Place 1 drop Into the left eye 2 times daily      latanoprost (XALATAN) 0.005 % ophthalmic solution Place 1 drop into both eyes At Bedtime   3       No Known Allergies     Social History     Tobacco Use    Smoking status: Never    Smokeless tobacco: Never   Substance Use Topics    Alcohol use: Yes     Alcohol/week: 2.0 standard drinks of alcohol     Types: 2 Standard drinks or equivalent per week     Comment: 1-2 per week     Family History   Problem Relation Age of Onset    Rheumatoid Arthritis Mother     Valvular heart disease Father     Hyperthyroidism Sister     Heart Disease Sister     Heart Disease Brother     CABG Brother     Heart Disease Maternal Grandmother     Clotting Disorder No family hx of     Anesthesia Reaction No family hx of      History   Drug Use No             Review of Systems  Constitutional, neuro, ENT, endocrine, pulmonary,  "cardiac, gastrointestinal, genitourinary, musculoskeletal, integument and psychiatric systems are negative, except as otherwise noted.    Objective    /82   Pulse 64   Temp 97.5  F (36.4  C) (Oral)   Resp 16   Ht 1.613 m (5' 3.5\")   Wt 72.1 kg (159 lb)   SpO2 98%   BMI 27.72 kg/m     Estimated body mass index is 27.72 kg/m  as calculated from the following:    Height as of this encounter: 1.613 m (5' 3.5\").    Weight as of this encounter: 72.1 kg (159 lb).  Physical Exam  GENERAL: alert and no distress  EYES: Eyes grossly normal to inspection, PERRL and conjunctivae and sclerae normal  HENT: ear canals and TM's normal, nose and mouth without ulcers or lesions  NECK: no adenopathy, no asymmetry, masses, or scars  RESP: lungs clear to auscultation - no rales, rhonchi or wheezes  CV: regular rate and rhythm, normal S1 S2, no S3 or S4, no murmur, click or rub, no peripheral edema  ABDOMEN: soft, nontender, no hepatosplenomegaly, no masses and bowel sounds normal  MS: no gross musculoskeletal defects noted, no edema  SKIN: no suspicious lesions or rashes  NEURO: Normal strength and tone, mentation intact and speech normal  PSYCH: mentation appears normal, affect normal/bright    Recent Labs   Lab Test 08/21/24  0835 05/20/24  1536   HGB  --  13.6   NA  --  140   POTASSIUM  --  4.1   CR 1.0 0.88   A1C  --  4.9        Diagnostics  Labs pending at this time.  Results will be reviewed when available.   EKG required for known coronary heart disease and significant arrhythmia and not completed in the last 90 days.  I ordered and personally reviewed the twelve-lead EKG which reveals sinus bradycardia at 58 bpm, left axis deviation, no ischemic or arrhythmic changes.  Noted to have biphasic T wave in lead III.    Revised Cardiac Risk Index (RCRI)  The patient has the following serious cardiovascular risks for perioperative complications:   - No serious cardiac risks = 0 points     RCRI Interpretation: 0 points: " Class I (very low risk - 0.4% complication rate)         Signed Electronically by: Donna Buchanan MD  A copy of this evaluation report is provided to the requesting physician.

## 2025-02-05 NOTE — PROGRESS NOTES
Preoperative Evaluation  [facility]  [address]  [facility] [address]  Phone: [phone]  Fax: [phone]  Primary Provider: Donna Buchanan MD  Pre-op Performing Provider: Donna Buchanan MD  Feb 5, 2025 2/5/2025   Surgical Information   What procedure is being done? hip replacement   Facility or Hospital where procedure/surgery will be performed: [facility]   Who is doing the procedure / surgery? Leon Oliverloretta   Date of surgery / procedure: Feb 19 2025   Time of surgery / procedure: hip replacement time not yet scheduled   Where do you plan to recover after surgery? at home with family     Fax number for surgical facility: Note does not need to be faxed, will be available electronically in Epic.    Assessment & Plan     The proposed surgical procedure is considered INTERMEDIATE risk.    Preop general physical exam  Surgical risks include stable hypertension not requiring medications, chronic kidney disease stage IIIa with most recent GFR in the normal range, nonobstructive coronary artery disease, rhythm controlled atrial fibrillation, and medication controlled hypercholesterolemia.  At this time her health conditions are adequately managed but she may proceed with surgery as scheduled without further clinical clarification or evaluation and may proceed with choice of anesthesia.  We will check basic metabolic panel and CBC preoperatively.  - Basic metabolic panel; Future  - CBC with platelets; Future    Primary osteoarthritis of left hip  To proceed with surgical replacement as noted.    Stage 3a chronic kidney disease (H)  Minimize nephrotoxic agents.  Will check basic metabolic panel preoperatively.  Lisinopril on hold due to relative hypotension.  - Basic metabolic panel; Future    Essential hypertension  Stable and controlled without need for medication.  Should postoperative medication be needed, she has done well with lisinopril in the  past.    Atrial fibrillation, unspecified type (H)  Rate controlled on propafenone, has not required anticoagulation per cardiology.    Coronary artery disease involving native coronary artery of native heart without angina pectoris; mild and nonobstructive  This is nonobstructive and noted on imaging, not requiring aggressive treatments at this time.  EKG without concerning findings.  - EKG 12-lead, tracing only    Hypercholesterolemia  Stable and controlled on atorvastatin.  Continue.    Hypothyroidism, unspecified type  Stable and controlled on levothyroxine, continue.    The longitudinal plan of care for the diagnosis(es)/condition(s) as documented were addressed during this visit. Due to the added complexity in care, I will continue to support Alexandra in the subsequent management and with ongoing continuity of care.        - No identified additional risk factors other than previously addressed    Antiplatelet or Anticoagulation Medication Instructions   - Patient is on no antiplatelet or anticoagulation medications.    Additional Medication Instructions  Take all scheduled medications on the day of surgery    Recommendation  Approval given to proceed with proposed procedure, without further diagnostic evaluation.    Wen oMrris is a 84 year old, presenting for the following:  Pre-Op Exam and Check spot on face         HPI related to upcoming procedure: Progressive pain and dysfunction due to left hip osteoarthritis, requiring surgical intervention.  Previous history of atrial fibrillation, this has been rhythm controlled with propafenone and per cardiology has not required anticoagulation.  Remains on atorvastatin for history of hypercholesterolemia.  She is been diagnosed with nonobstructive coronary artery disease, no prior history of ischemic heart disease, myocardial infarction, or angina.  History of chronic kidney disease stage IIIa with fluctuations in GFR as low as the 40s, most recently in the  normal range.  Previously taking lisinopril but this has been held due to relative hypotension, history of hypertension has not required medications currently.  Hypothyroidism has been stable on levothyroxine, most recent TSH normal at 3.295 2024.        2/5/2025   Pre-Op Questionnaire   Have you ever had a heart attack or stroke? No   Have you ever had surgery on your heart or blood vessels, such as a stent placement, a coronary artery bypass, or surgery on an artery in your head, neck, heart, or legs? No   Do you have chest pain with activity? No   Do you have a history of heart failure? No   Do you currently have a cold, bronchitis or symptoms of other infection? No   Do you have a cough, shortness of breath, or wheezing? No   Do you or anyone in your family have previous history of blood clots? No   Do you or does anyone in your family have a serious bleeding problem such as prolonged bleeding following surgeries or cuts? No   Have you ever had problems with anemia or been told to take iron pills? No   Have you had any abnormal blood loss such as black, tarry or bloody stools, or abnormal vaginal bleeding? No   Have you ever had a blood transfusion? No   Are you willing to have a blood transfusion if it is medically needed before, during, or after your surgery? Yes   Have you or any of your relatives ever had problems with anesthesia? No   Do you have sleep apnea, excessive snoring or daytime drowsiness? No   Do you have any artifical heart valves or other implanted medical devices like a pacemaker, defibrillator, or continuous glucose monitor? No   Do you have artificial joints? (!) YES   Are you allergic to latex? No     Health Care Directive  Patient does not have a Health Care Directive: Patient states has Advance Directive and will bring in a copy to clinic.    Preoperative Review of    reviewed - no record of controlled substances prescribed.          Patient Active Problem List    Diagnosis Date  Noted    Primary osteoarthritis of left hip 11/06/2024     Priority: Medium    Osteopenia, unspecified location 05/17/2023     Priority: Medium    Atrial fibrillation (H) 07/30/2022     Priority: Medium    Elevated parathyroid hormone 04/17/2022     Priority: Medium    Coronary artery disease involving native coronary artery of native heart without angina pectoris; mild and nonobstructive 01/28/2022     Priority: Medium     Mild LAD stenosis 20% December 2020 by CTA      Hypercholesterolemia      Priority: Medium     Created by Conversion        Hypothyroidism      Priority: Medium     Created by Conversion  Replacement Utility updated for latest IMO load        CKD (chronic kidney disease) stage 3, GFR 30-59 ml/min (H) 03/21/2019     Priority: Medium     Lisinopril held due to hypotension (per Dr. Escalona summer 2024)      Vitamin D deficiency 06/28/2018     Priority: Medium    Frequent unifocal premature ventricular contractions 01/10/2017     Priority: Medium    Essential hypertension 01/10/2017     Priority: Medium    Localized Osteoarthritis Of The Knee      Priority: Medium     Created by Conversion  Replacement Utility updated for latest IMO load        History of breast cancer 04/08/2015     Priority: Medium    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      Priority: Medium     Created by Conversion          Past Medical History:   Diagnosis Date    Arrhythmia     take propafenone    Arthritis     Breast cancer (H) 01/01/1992    right Mastectomy    CKD (chronic kidney disease) stage 3, GFR 30-59 ml/min (H) 03/21/2019    Coronary artery disease     Disease of thyroid gland     hypothyroid    Hypercholesterolemia     Hypertension     Multiple premature ventricular complexes 03/21/2019    Sinus bradycardia     frequent PVC's    Squamous cell cancer of skin of hand 06/01/2015     Past Surgical History:   Procedure Laterality Date    BREAST SURGERY Right 1992    Mastectomy for Ca     MASTECTOMY Right 1992    PHACOEMULSIFICATION CLEAR CORNEA WITH STANDARD INTRAOCULAR LENS IMPLANT Bilateral     REPLACEMENT TOTAL KNEE Left 3/21/2019    Procedure: LEFT TOTAL KNEE ARTHROPLASTY,COMPUTER-ASSIST;  Surgeon: Fede Robert MD;  Location: [facility];  Service: Orthopedics     Current Outpatient Medications   Medication Sig Dispense Refill    atorvastatin (LIPITOR) 20 MG tablet TAKE 1 TABLET BY MOUTH EVERY DAY 90 tablet 3    ketorolac (ACULAR) 0.5 % ophthalmic solution Place 1 drop Into the left eye 2 times daily      levothyroxine (SYNTHROID/LEVOTHROID) 25 MCG tablet TAKE 1 TABLET BY MOUTH DAILY AT 6 AM 90 tablet 1    omega-3 fatty acids-fish oil (OMEGA 3 FISH OIL) 684-1,200 mg CpDR [OMEGA-3 FATTY ACIDS-FISH OIL (OMEGA 3 FISH OIL) 684-1,200 MG CPDR] Take 2 tablets by mouth every morning.      prednisoLONE acetate (PRED FORTE) 1 % ophthalmic suspension Place 1 drop Into the left eye daily      propafenone (RYTHMOL) 150 MG TABS tablet Take 1 tablet (150 mg) by mouth 2 times daily. 180 tablet 2    timolol maleate (TIMOPTIC) 0.25 % ophthalmic solution Place 1 drop Into the left eye 2 times daily      latanoprost (XALATAN) 0.005 % ophthalmic solution Place 1 drop into both eyes At Bedtime   3       No Known Allergies     Social History     Tobacco Use    Smoking status: Never    Smokeless tobacco: Never   Substance Use Topics    Alcohol use: Yes     Alcohol/week: 2.0 standard drinks of alcohol     Types: 2 Standard drinks or equivalent per week     Comment: 1-2 per week     Family History   Problem Relation Age of Onset    Rheumatoid Arthritis Mother     Valvular heart disease Father     Hyperthyroidism Sister     Heart Disease Sister     Heart Disease Brother     CABG Brother     Heart Disease Maternal Grandmother     Clotting Disorder No family hx of     Anesthesia Reaction No family hx of      History   Drug Use No             Review of Systems  Constitutional, neuro, ENT, endocrine, pulmonary,  "cardiac, gastrointestinal, genitourinary, musculoskeletal, integument and psychiatric systems are negative, except as otherwise noted.    Objective    /82   Pulse 64   Temp 97.5  F (36.4  C) (Oral)   Resp 16   Ht 1.613 m (5' 3.5\")   Wt 72.1 kg (159 lb)   SpO2 98%   BMI 27.72 kg/m     Estimated body mass index is 27.72 kg/m  as calculated from the following:    Height as of this encounter: 1.613 m (5' 3.5\").    Weight as of this encounter: 72.1 kg (159 lb).  Physical Exam  GENERAL: alert and no distress  EYES: Eyes grossly normal to inspection, PERRL and conjunctivae and sclerae normal  HENT: ear canals and TM's normal, nose and mouth without ulcers or lesions  NECK: no adenopathy, no asymmetry, masses, or scars  RESP: lungs clear to auscultation - no rales, rhonchi or wheezes  CV: regular rate and rhythm, normal S1 S2, no S3 or S4, no murmur, click or rub, no peripheral edema  ABDOMEN: soft, nontender, no hepatosplenomegaly, no masses and bowel sounds normal  MS: no gross musculoskeletal defects noted, no edema  SKIN: no suspicious lesions or rashes  NEURO: Normal strength and tone, mentation intact and speech normal  PSYCH: mentation appears normal, affect normal/bright    Recent Labs   Lab Test 08/21/24  0835 05/20/24  1536   HGB  --  13.6   NA  --  140   POTASSIUM  --  4.1   CR 1.0 0.88   A1C  --  4.9        Diagnostics  Labs pending at this time.  Results will be reviewed when available.   EKG required for known coronary heart disease and significant arrhythmia and not completed in the last 90 days.  I ordered and personally reviewed the twelve-lead EKG which reveals sinus bradycardia at 58 bpm, left axis deviation, no ischemic or arrhythmic changes.  Noted to have biphasic T wave in lead III.    Revised Cardiac Risk Index (RCRI)  The patient has the following serious cardiovascular risks for perioperative complications:   - No serious cardiac risks = 0 points     RCRI Interpretation: 0 points: " Class I (very low risk - 0.4% complication rate)         Signed Electronically by: Donna Buchanan MD  A copy of this evaluation report is provided to the requesting physician.

## 2025-02-06 LAB
ANION GAP SERPL CALCULATED.3IONS-SCNC: 11 MMOL/L (ref 7–15)
BUN SERPL-MCNC: 29 MG/DL (ref 8–23)
CALCIUM SERPL-MCNC: 9.7 MG/DL (ref 8.8–10.4)
CHLORIDE SERPL-SCNC: 104 MMOL/L (ref 98–107)
CREAT SERPL-MCNC: 0.99 MG/DL (ref 0.51–0.95)
EGFRCR SERPLBLD CKD-EPI 2021: 57 ML/MIN/1.73M2
GLUCOSE SERPL-MCNC: 116 MG/DL (ref 70–99)
HCO3 SERPL-SCNC: 24 MMOL/L (ref 22–29)
POTASSIUM SERPL-SCNC: 4.5 MMOL/L (ref 3.4–5.3)
SODIUM SERPL-SCNC: 139 MMOL/L (ref 135–145)

## 2025-02-18 ENCOUNTER — ANESTHESIA EVENT (OUTPATIENT)
Dept: SURGERY | Facility: CLINIC | Age: 85
End: 2025-02-18
Payer: COMMERCIAL

## 2025-02-18 NOTE — TREATMENT PLAN
Orthopedic Surgery Pre-Op Plan: Alexandra Menezes  pre-op review. This is NOT an H&P   Surgeon: Dr. Arroyo   [facility]: [facility]  Name of Surgery: Left Direct Anterior Total Hip Arthroplasty  Date of Surgery: 2/19/25  H&P: Completed on 2/5/25 by Dr. Donna Buchanan at [facility].   History of ASA, NSAIDS, vitamin and/or herbal supplements, GLP-1 Agonist or SGLT Inhibitor medication taken within 10 days?: Yes: Omega 3 Fish Oil: Patient was instructed to hold this supplement for 7 days before surgery.  History of blood thinners?: No    Plan:   1) Discharge Plan: Home on morning of POD 1 with assist of Family (Adult Children).  Please see Discharge Planning section near bottom of this note for further details.     2) Coronary Artery Disease: Non-Obstructive: per most recent CT coronary angiogram on 8/21/24 below.  She denies any chest pain/chest discomfort, FERNANDES, or palpitations.  Cleared by PCP for surgery at very low risk for serious perioperative cardiovascular complications (0.4% per [facility]).     CT coronary angiogram 8/21/24:  Mild soft plaque in the mid LAD with < 50% stenosis.  Left main is normal  Circumflex coronary artery and its branches are normal  Right coronary artery is a large dominant vessel and is normal   Aorta is normal in size where seen  No LA thrombus  No pericardial effusion or calcification    3) Frequent PVC's: Per most recent Cardiology visit note with Dr. Escalona at [facility] on 7/8/24: Frequent PVCs have been symptomatically well suppressed with propafenone. Zio patch monitor done 7/2024 below showed predominant rhythm was sinus rhythm, no sustained tachyarrhythmias.  No atrial fibrillation.     Zio patch monitor 7/12/24 to 7/19/24:  Zio monitoring from 7/12/2024 to 7/19/2024 (duration 6d 13h).  Predominant rhythm was sinus rhythm, 38 to 110bpm, average 55bpm.  7 episodes of nonsustained supraventricular tachycardia - longest 7 beats,  fastest 138bpm.  No sustained tachyarrhythmias.  No atrial fibrillation.  There were no pauses of greater than 3 seconds.  Rare supraventricular ectopic beats (isolated <1%).  Rare premature ventricular contractions (isolated <1%).  Symptom triggers (2) correlated to sinus rhythm 53-65bpm.    4) History of Falls, possible Syncope: Patient reported some falls last year which occurred when she got up quickly and felt dizzy/lightheaded. Reported at that time that her systolic BP's were frequently running less than 90. Dr. Escalona ordered CT coronary angiogram, Echo and Zio patch monitor. CT coronary angiogram 8/21/24 showed non-obstructive coronary artery disease, echocardiogram 7/12/24 showed normal left ventricular size, wall motion and function.  LVEF 60-65%.  No significant valvular abnormalities.  Zio patch monitor done 7/2024 showed predominant rhythm was sinus rhythm, no sustained tachyarrhythmias.  No atrial fibrillation.  Based on this workup, Dr. Escalona felt patient's falls/lightheadedness were most likely due to orthostatic hypotension possibly from lisinopril.  Lisinopril was discontinued at that time and it appears lightheadedness has now improved.    5) Hypertension: No longer on anti-hypertensive medications.  Previously on lisinopril but this was discontinued per instructions from cardiology after patient had hypotension/dizziness/falls.     6) Hypercholesterolemia: On atorvastatin.    7) Hypothyroidism: On levothyroxine.    8) History of Breast Cancer: S/P Right Mastectomy in 1992:     9) Chronic Kidney Disease: Stage 3a: Stable.  Creatinine 0.99, GFR 57, BUN 29.0 on 2/5/2025. I recommend promoting good post-op hydration and monitoring post-op kidney function.       Patient appears medically optimized for upcoming surgery. I would recommend Hospitalist Consult to assist with medical management. Please call me below with any questions on this patient.       Review of Systems Notable for: Coronary  artery disease-non-obstructive, frequent PVCs-well suppressed on propafenone, history of falls, possible syncope, hypertension, hypercholesterolemia, hypothyroidism, history of breast cancer-s/p right mastectomy in 1992.    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 Borja       Current Medications:  Patient's Medications   New Prescriptions    No medications on file   Previous Medications    ATORVASTATIN (LIPITOR) 20 MG TABLET    TAKE 1 TABLET BY MOUTH EVERY DAY    KETOROLAC (ACULAR) 0.5 % OPHTHALMIC SOLUTION    Place 1 drop Into the left eye 2 times daily    LEVOTHYROXINE (SYNTHROID/LEVOTHROID) 25 MCG TABLET    TAKE 1 TABLET BY MOUTH DAILY AT 6 AM    OMEGA-3 FATTY ACIDS-FISH OIL (OMEGA 3 FISH OIL) 684-1,200 MG CPDR    [OMEGA-3 FATTY ACIDS-FISH OIL (OMEGA 3 FISH OIL) 684-1,200 MG CPDR] Take 2 tablets by mouth every morning.    PREDNISOLONE ACETATE (PRED FORTE) 1 % OPHTHALMIC SUSPENSION    Place 1 drop Into the left eye daily    PROPAFENONE (RYTHMOL) 150 MG TABS TABLET    Take 1 tablet (150 mg) by mouth  2 times daily.    TIMOLOL MALEATE (TIMOPTIC) 0.25 % OPHTHALMIC SOLUTION    Place 1 drop Into the left eye 2 times daily   Modified Medications    No medications on file   Discontinued Medications    No medications on file       ALLERGIES:  No Known Allergies    Social History  Social History     Tobacco Use    Smoking status: Never    Smokeless tobacco: Never   Vaping Use    Vaping status: Never Used   Substance Use Topics    Alcohol use: Yes     Alcohol/week: 2.0 standard drinks of alcohol     Types: 2 Standard drinks or equivalent per week     Comment: 1-2 per week    Drug use: No       Any Abnormal Recent Diagnostics? No      Discharge Planning: Discharge plan below is per patient report    Home on morning of POD 1 with assist of Family (Adult Children).    02/10/25 1141   Discharge Planning   Patient/Family Anticipates Transition to (Patient-Rptd)  home   Living Arrangements   Type of Residence (Patient-Rptd)  Private Residence   Number of Stairs, Within Home, Primary (Patient-Rptd)  two   Stair Railings, Within Home, Primary (Patient-Rptd)  railings safe and in good condition   Once home, are you able to live on one level? (Patient-Rptd)  Yes   Equipment Currently Used at Home (Patient-Rptd)  cane, straight;commode chair;raised toilet seat;shower chair;tub bench;walker, standard   Support System   Support Systems (Patient-Rptd)  Family Members;Friends/Neighbors   Do you have someone available to stay with you one or two nights once you are home? (Patient-Rptd)  Yes       TALAT Helms, CNP   Advanced Practice Nurse Navigator- Orthopedics  [facility]   Phone: [phone]

## 2025-02-19 ENCOUNTER — ANESTHESIA (OUTPATIENT)
Dept: SURGERY | Facility: CLINIC | Age: 85
End: 2025-02-19
Payer: COMMERCIAL

## 2025-02-19 ENCOUNTER — HOSPITAL ENCOUNTER (INPATIENT)
Facility: CLINIC | Age: 85
DRG: 470 | End: 2025-02-19
Attending: ORTHOPAEDIC SURGERY | Admitting: ORTHOPAEDIC SURGERY
Payer: COMMERCIAL

## 2025-02-19 ENCOUNTER — APPOINTMENT (OUTPATIENT)
Dept: RADIOLOGY | Facility: CLINIC | Age: 85
DRG: 470 | End: 2025-02-19
Attending: ORTHOPAEDIC SURGERY
Payer: COMMERCIAL

## 2025-02-19 DIAGNOSIS — M16.12 PRIMARY OSTEOARTHRITIS OF LEFT HIP: Primary | ICD-10-CM

## 2025-02-19 PROBLEM — M16.9 HIP OSTEOARTHRITIS: Status: ACTIVE | Noted: 2025-02-19

## 2025-02-19 LAB
ANION GAP SERPL CALCULATED.3IONS-SCNC: 9 MMOL/L (ref 7–15)
ANION GAP SERPL CALCULATED.3IONS-SCNC: 9 MMOL/L (ref 7–15)
BUN SERPL-MCNC: 18.8 MG/DL (ref 8–23)
BUN SERPL-MCNC: 20.1 MG/DL (ref 8–23)
CALCIUM SERPL-MCNC: 9.1 MG/DL (ref 8.8–10.4)
CALCIUM SERPL-MCNC: 9.5 MG/DL (ref 8.8–10.4)
CHLORIDE SERPL-SCNC: 102 MMOL/L (ref 98–107)
CHLORIDE SERPL-SCNC: 103 MMOL/L (ref 98–107)
CREAT SERPL-MCNC: 0.87 MG/DL (ref 0.51–0.95)
CREAT SERPL-MCNC: 0.9 MG/DL (ref 0.51–0.95)
EGFRCR SERPLBLD CKD-EPI 2021: 64 ML/MIN/1.73M2
EGFRCR SERPLBLD CKD-EPI 2021: 66 ML/MIN/1.73M2
GLUCOSE SERPL-MCNC: 105 MG/DL (ref 70–99)
GLUCOSE SERPL-MCNC: 123 MG/DL (ref 70–99)
HCO3 SERPL-SCNC: 24 MMOL/L (ref 22–29)
HCO3 SERPL-SCNC: 27 MMOL/L (ref 22–29)
MAGNESIUM SERPL-MCNC: 1.6 MG/DL (ref 1.7–2.3)
POTASSIUM SERPL-SCNC: 4.5 MMOL/L (ref 3.4–5.3)
POTASSIUM SERPL-SCNC: 4.6 MMOL/L (ref 3.4–5.3)
SODIUM SERPL-SCNC: 136 MMOL/L (ref 135–145)
SODIUM SERPL-SCNC: 138 MMOL/L (ref 135–145)

## 2025-02-19 PROCEDURE — 250N000013 HC RX MED GY IP 250 OP 250 PS 637: Performed by: FAMILY MEDICINE

## 2025-02-19 PROCEDURE — 360N000077 HC SURGERY LEVEL 4, PER MIN: Performed by: ORTHOPAEDIC SURGERY

## 2025-02-19 PROCEDURE — 710N000010 HC RECOVERY PHASE 1, LEVEL 2, PER MIN: Performed by: ORTHOPAEDIC SURGERY

## 2025-02-19 PROCEDURE — 80048 BASIC METABOLIC PNL TOTAL CA: CPT | Performed by: ORTHOPAEDIC SURGERY

## 2025-02-19 PROCEDURE — 370N000017 HC ANESTHESIA TECHNICAL FEE, PER MIN: Performed by: ORTHOPAEDIC SURGERY

## 2025-02-19 PROCEDURE — 0SRB04Z REPLACEMENT OF LEFT HIP JOINT WITH CERAMIC ON POLYETHYLENE SYNTHETIC SUBSTITUTE, OPEN APPROACH: ICD-10-PCS | Performed by: ORTHOPAEDIC SURGERY

## 2025-02-19 PROCEDURE — 250N000011 HC RX IP 250 OP 636: Performed by: ORTHOPAEDIC SURGERY

## 2025-02-19 PROCEDURE — 80048 BASIC METABOLIC PNL TOTAL CA: CPT | Performed by: FAMILY MEDICINE

## 2025-02-19 PROCEDURE — 250N000013 HC RX MED GY IP 250 OP 250 PS 637: Performed by: PHYSICIAN ASSISTANT

## 2025-02-19 PROCEDURE — 258N000003 HC RX IP 258 OP 636: Performed by: ORTHOPAEDIC SURGERY

## 2025-02-19 PROCEDURE — C1776 JOINT DEVICE (IMPLANTABLE): HCPCS | Performed by: ORTHOPAEDIC SURGERY

## 2025-02-19 PROCEDURE — 258N000001 HC RX 258: Performed by: ORTHOPAEDIC SURGERY

## 2025-02-19 PROCEDURE — 999N000182 XR SURGERY CARM FLUORO GREATER THAN 5 MIN

## 2025-02-19 PROCEDURE — 250N000011 HC RX IP 250 OP 636: Performed by: PHYSICIAN ASSISTANT

## 2025-02-19 PROCEDURE — 36415 COLL VENOUS BLD VENIPUNCTURE: CPT | Performed by: ORTHOPAEDIC SURGERY

## 2025-02-19 PROCEDURE — 83735 ASSAY OF MAGNESIUM: CPT | Performed by: FAMILY MEDICINE

## 2025-02-19 PROCEDURE — 250N000011 HC RX IP 250 OP 636: Performed by: ANESTHESIOLOGY

## 2025-02-19 PROCEDURE — 272N000001 HC OR GENERAL SUPPLY STERILE: Performed by: ORTHOPAEDIC SURGERY

## 2025-02-19 PROCEDURE — 250N000009 HC RX 250: Performed by: ORTHOPAEDIC SURGERY

## 2025-02-19 PROCEDURE — 250N000013 HC RX MED GY IP 250 OP 250 PS 637: Performed by: ORTHOPAEDIC SURGERY

## 2025-02-19 PROCEDURE — 36415 COLL VENOUS BLD VENIPUNCTURE: CPT | Performed by: FAMILY MEDICINE

## 2025-02-19 PROCEDURE — 250N000009 HC RX 250: Performed by: ANESTHESIOLOGY

## 2025-02-19 PROCEDURE — 258N000003 HC RX IP 258 OP 636: Performed by: ANESTHESIOLOGY

## 2025-02-19 PROCEDURE — 250N000011 HC RX IP 250 OP 636: Performed by: FAMILY MEDICINE

## 2025-02-19 PROCEDURE — 99204 OFFICE O/P NEW MOD 45 MIN: CPT | Performed by: FAMILY MEDICINE

## 2025-02-19 PROCEDURE — 999N000141 HC STATISTIC PRE-PROCEDURE NURSING ASSESSMENT: Performed by: ORTHOPAEDIC SURGERY

## 2025-02-19 DEVICE — PINNACLE HIP SOLUTIONS ALTRX POLYETHYLENE ACETABULAR LINER +4 NEUTRAL 32MM ID 48MM OD
Type: IMPLANTABLE DEVICE | Site: HIP | Status: FUNCTIONAL
Brand: PINNACLE ALTRX

## 2025-02-19 DEVICE — ACTIS DUOFIX HIP PROSTHESIS (FEMORAL STEM 12/14 TAPER CEMENTLESS SIZE 5 HIGH COLLAR)  CE
Type: IMPLANTABLE DEVICE | Site: HIP | Status: FUNCTIONAL
Brand: ACTIS

## 2025-02-19 DEVICE — PINNACLE POROCOAT ACETABULAR SHELL SECTOR II 48MM OD
Type: IMPLANTABLE DEVICE | Site: HIP | Status: FUNCTIONAL
Brand: PINNACLE POROCOAT

## 2025-02-19 DEVICE — BIOLOX DELTA CERAMIC FEMORAL HEAD 32MM DIA +1 12/14 TAPER
Type: IMPLANTABLE DEVICE | Site: HIP | Status: FUNCTIONAL
Brand: BIOLOX DELTA

## 2025-02-19 RX ORDER — ONDANSETRON 4 MG/1
4 TABLET, ORALLY DISINTEGRATING ORAL EVERY 30 MIN PRN
Status: DISCONTINUED | OUTPATIENT
Start: 2025-02-19 | End: 2025-02-19 | Stop reason: HOSPADM

## 2025-02-19 RX ORDER — ONDANSETRON 2 MG/ML
4 INJECTION INTRAMUSCULAR; INTRAVENOUS EVERY 30 MIN PRN
Status: DISCONTINUED | OUTPATIENT
Start: 2025-02-19 | End: 2025-02-19 | Stop reason: HOSPADM

## 2025-02-19 RX ORDER — ASPIRIN 81 MG/1
81 TABLET ORAL 2 TIMES DAILY
Qty: 60 TABLET | Refills: 0 | Status: SHIPPED | OUTPATIENT
Start: 2025-02-19

## 2025-02-19 RX ORDER — HYDROMORPHONE HCL IN WATER/PF 6 MG/30 ML
0.2 PATIENT CONTROLLED ANALGESIA SYRINGE INTRAVENOUS EVERY 5 MIN PRN
Status: DISCONTINUED | OUTPATIENT
Start: 2025-02-19 | End: 2025-02-19 | Stop reason: HOSPADM

## 2025-02-19 RX ORDER — HYDROXYZINE HYDROCHLORIDE 10 MG/1
10 TABLET, FILM COATED ORAL EVERY 4 HOURS PRN
Status: DISCONTINUED | OUTPATIENT
Start: 2025-02-19 | End: 2025-02-21 | Stop reason: HOSPADM

## 2025-02-19 RX ORDER — HYDROMORPHONE HCL IN WATER/PF 6 MG/30 ML
0.2 PATIENT CONTROLLED ANALGESIA SYRINGE INTRAVENOUS EVERY 4 HOURS PRN
Status: DISCONTINUED | OUTPATIENT
Start: 2025-02-19 | End: 2025-02-21 | Stop reason: HOSPADM

## 2025-02-19 RX ORDER — AMOXICILLIN 250 MG
1 CAPSULE ORAL 2 TIMES DAILY
Status: DISCONTINUED | OUTPATIENT
Start: 2025-02-19 | End: 2025-02-21 | Stop reason: HOSPADM

## 2025-02-19 RX ORDER — BUPIVACAINE HYDROCHLORIDE 5 MG/ML
INJECTION, SOLUTION EPIDURAL; INTRACAUDAL
Status: COMPLETED | OUTPATIENT
Start: 2025-02-19 | End: 2025-02-19

## 2025-02-19 RX ORDER — OXYCODONE HYDROCHLORIDE 5 MG/1
5 TABLET ORAL EVERY 4 HOURS PRN
Status: DISCONTINUED | OUTPATIENT
Start: 2025-02-19 | End: 2025-02-20

## 2025-02-19 RX ORDER — MAGNESIUM HYDROXIDE 1200 MG/15ML
LIQUID ORAL PRN
Status: DISCONTINUED | OUTPATIENT
Start: 2025-02-19 | End: 2025-02-19 | Stop reason: HOSPADM

## 2025-02-19 RX ORDER — ONDANSETRON 4 MG/1
4 TABLET, ORALLY DISINTEGRATING ORAL EVERY 6 HOURS PRN
Status: DISCONTINUED | OUTPATIENT
Start: 2025-02-19 | End: 2025-02-21 | Stop reason: HOSPADM

## 2025-02-19 RX ORDER — BISACODYL 10 MG
10 SUPPOSITORY, RECTAL RECTAL DAILY PRN
Status: DISCONTINUED | OUTPATIENT
Start: 2025-02-22 | End: 2025-02-21 | Stop reason: HOSPADM

## 2025-02-19 RX ORDER — NALOXONE HYDROCHLORIDE 0.4 MG/ML
0.4 INJECTION, SOLUTION INTRAMUSCULAR; INTRAVENOUS; SUBCUTANEOUS
Status: DISCONTINUED | OUTPATIENT
Start: 2025-02-19 | End: 2025-02-21 | Stop reason: HOSPADM

## 2025-02-19 RX ORDER — POLYETHYLENE GLYCOL 3350 17 G/17G
17 POWDER, FOR SOLUTION ORAL DAILY
Status: DISCONTINUED | OUTPATIENT
Start: 2025-02-20 | End: 2025-02-21 | Stop reason: HOSPADM

## 2025-02-19 RX ORDER — ACETAMINOPHEN 325 MG/1
975 TABLET ORAL EVERY 8 HOURS
Status: DISCONTINUED | OUTPATIENT
Start: 2025-02-19 | End: 2025-02-19

## 2025-02-19 RX ORDER — LIDOCAINE 40 MG/G
CREAM TOPICAL
Status: DISCONTINUED | OUTPATIENT
Start: 2025-02-19 | End: 2025-02-19 | Stop reason: HOSPADM

## 2025-02-19 RX ORDER — OXYCODONE HYDROCHLORIDE 5 MG/1
10 TABLET ORAL
Status: DISCONTINUED | OUTPATIENT
Start: 2025-02-19 | End: 2025-02-19 | Stop reason: HOSPADM

## 2025-02-19 RX ORDER — OXYCODONE HYDROCHLORIDE 5 MG/1
5-10 TABLET ORAL EVERY 4 HOURS PRN
Qty: 25 TABLET | Refills: 0 | Status: SHIPPED | OUTPATIENT
Start: 2025-02-19

## 2025-02-19 RX ORDER — SODIUM CHLORIDE, SODIUM LACTATE, POTASSIUM CHLORIDE, CALCIUM CHLORIDE 600; 310; 30; 20 MG/100ML; MG/100ML; MG/100ML; MG/100ML
INJECTION, SOLUTION INTRAVENOUS CONTINUOUS
Status: DISCONTINUED | OUTPATIENT
Start: 2025-02-19 | End: 2025-02-19 | Stop reason: HOSPADM

## 2025-02-19 RX ORDER — NALOXONE HYDROCHLORIDE 0.4 MG/ML
0.1 INJECTION, SOLUTION INTRAMUSCULAR; INTRAVENOUS; SUBCUTANEOUS
Status: DISCONTINUED | OUTPATIENT
Start: 2025-02-19 | End: 2025-02-19 | Stop reason: HOSPADM

## 2025-02-19 RX ORDER — LATANOPROST 50 UG/ML
1 SOLUTION/ DROPS OPHTHALMIC AT BEDTIME
COMMUNITY

## 2025-02-19 RX ORDER — NALOXONE HYDROCHLORIDE 0.4 MG/ML
0.2 INJECTION, SOLUTION INTRAMUSCULAR; INTRAVENOUS; SUBCUTANEOUS
Status: DISCONTINUED | OUTPATIENT
Start: 2025-02-19 | End: 2025-02-21 | Stop reason: HOSPADM

## 2025-02-19 RX ORDER — LIDOCAINE 40 MG/G
CREAM TOPICAL
Status: DISCONTINUED | OUTPATIENT
Start: 2025-02-19 | End: 2025-02-21 | Stop reason: HOSPADM

## 2025-02-19 RX ORDER — GLYCINE 1.5 G/100ML
SOLUTION IRRIGATION PRN
Status: DISCONTINUED | OUTPATIENT
Start: 2025-02-19 | End: 2025-02-19 | Stop reason: HOSPADM

## 2025-02-19 RX ORDER — DEXAMETHASONE SODIUM PHOSPHATE 4 MG/ML
4 INJECTION, SOLUTION INTRA-ARTICULAR; INTRALESIONAL; INTRAMUSCULAR; INTRAVENOUS; SOFT TISSUE
Status: DISCONTINUED | OUTPATIENT
Start: 2025-02-19 | End: 2025-02-19 | Stop reason: HOSPADM

## 2025-02-19 RX ORDER — TRANEXAMIC ACID 650 MG/1
1950 TABLET ORAL ONCE
Status: COMPLETED | OUTPATIENT
Start: 2025-02-19 | End: 2025-02-19

## 2025-02-19 RX ORDER — PROPAFENONE HYDROCHLORIDE 150 MG/1
150 TABLET, COATED ORAL 2 TIMES DAILY
Status: DISCONTINUED | OUTPATIENT
Start: 2025-02-19 | End: 2025-02-21 | Stop reason: HOSPADM

## 2025-02-19 RX ORDER — LEVOTHYROXINE SODIUM 25 UG/1
25 TABLET ORAL DAILY
Status: DISCONTINUED | OUTPATIENT
Start: 2025-02-19 | End: 2025-02-21 | Stop reason: HOSPADM

## 2025-02-19 RX ORDER — CEFAZOLIN SODIUM/WATER 2 G/20 ML
2 SYRINGE (ML) INTRAVENOUS
Status: COMPLETED | OUTPATIENT
Start: 2025-02-19 | End: 2025-02-19

## 2025-02-19 RX ORDER — ACETAMINOPHEN 500 MG
1000 TABLET ORAL 3 TIMES DAILY
Status: ON HOLD | COMMUNITY
End: 2025-02-21

## 2025-02-19 RX ORDER — CEFAZOLIN SODIUM 2 G/100ML
2 INJECTION, SOLUTION INTRAVENOUS EVERY 8 HOURS
Status: COMPLETED | OUTPATIENT
Start: 2025-02-19 | End: 2025-02-20

## 2025-02-19 RX ORDER — TIMOLOL MALEATE 2.5 MG/ML
1 SOLUTION/ DROPS OPHTHALMIC 2 TIMES DAILY
Status: DISCONTINUED | OUTPATIENT
Start: 2025-02-19 | End: 2025-02-21 | Stop reason: HOSPADM

## 2025-02-19 RX ORDER — EPHEDRINE SULFATE 50 MG/ML
INJECTION, SOLUTION INTRAMUSCULAR; INTRAVENOUS; SUBCUTANEOUS PRN
Status: DISCONTINUED | OUTPATIENT
Start: 2025-02-19 | End: 2025-02-19

## 2025-02-19 RX ORDER — KETOROLAC TROMETHAMINE 5 MG/ML
1 SOLUTION OPHTHALMIC 2 TIMES DAILY
Status: DISCONTINUED | OUTPATIENT
Start: 2025-02-19 | End: 2025-02-21 | Stop reason: HOSPADM

## 2025-02-19 RX ORDER — CALCIUM CARBONATE 500(1250)
1 TABLET ORAL DAILY
COMMUNITY

## 2025-02-19 RX ORDER — AMOXICILLIN 250 MG
1-2 CAPSULE ORAL 2 TIMES DAILY
Qty: 30 TABLET | Refills: 0 | Status: SHIPPED | OUTPATIENT
Start: 2025-02-19

## 2025-02-19 RX ORDER — ONDANSETRON 2 MG/ML
4 INJECTION INTRAMUSCULAR; INTRAVENOUS EVERY 6 HOURS PRN
Status: DISCONTINUED | OUTPATIENT
Start: 2025-02-19 | End: 2025-02-21 | Stop reason: HOSPADM

## 2025-02-19 RX ORDER — ACETAMINOPHEN 500 MG
1000 TABLET ORAL 3 TIMES DAILY
Status: DISCONTINUED | OUTPATIENT
Start: 2025-02-19 | End: 2025-02-21 | Stop reason: HOSPADM

## 2025-02-19 RX ORDER — FENTANYL CITRATE 50 UG/ML
50 INJECTION, SOLUTION INTRAMUSCULAR; INTRAVENOUS EVERY 5 MIN PRN
Status: DISCONTINUED | OUTPATIENT
Start: 2025-02-19 | End: 2025-02-19 | Stop reason: HOSPADM

## 2025-02-19 RX ORDER — FENTANYL CITRATE 50 UG/ML
INJECTION, SOLUTION INTRAMUSCULAR; INTRAVENOUS PRN
Status: DISCONTINUED | OUTPATIENT
Start: 2025-02-19 | End: 2025-02-19

## 2025-02-19 RX ORDER — OXYCODONE HYDROCHLORIDE 5 MG/1
5 TABLET ORAL
Status: DISCONTINUED | OUTPATIENT
Start: 2025-02-19 | End: 2025-02-19 | Stop reason: HOSPADM

## 2025-02-19 RX ORDER — HYDROMORPHONE HCL IN WATER/PF 6 MG/30 ML
0.4 PATIENT CONTROLLED ANALGESIA SYRINGE INTRAVENOUS EVERY 5 MIN PRN
Status: DISCONTINUED | OUTPATIENT
Start: 2025-02-19 | End: 2025-02-19 | Stop reason: HOSPADM

## 2025-02-19 RX ORDER — IBUPROFEN 200 MG
400 TABLET ORAL 3 TIMES DAILY
Status: ON HOLD | COMMUNITY
End: 2025-02-21

## 2025-02-19 RX ORDER — PROCHLORPERAZINE MALEATE 5 MG/1
5 TABLET ORAL EVERY 6 HOURS PRN
Status: DISCONTINUED | OUTPATIENT
Start: 2025-02-19 | End: 2025-02-21 | Stop reason: HOSPADM

## 2025-02-19 RX ORDER — PREDNISOLONE ACETATE 10 MG/ML
1 SUSPENSION/ DROPS OPHTHALMIC DAILY
Status: DISCONTINUED | OUTPATIENT
Start: 2025-02-20 | End: 2025-02-21 | Stop reason: HOSPADM

## 2025-02-19 RX ORDER — MAGNESIUM SULFATE HEPTAHYDRATE 40 MG/ML
2 INJECTION, SOLUTION INTRAVENOUS ONCE
Status: COMPLETED | OUTPATIENT
Start: 2025-02-19 | End: 2025-02-19

## 2025-02-19 RX ORDER — FENTANYL CITRATE 50 UG/ML
25 INJECTION, SOLUTION INTRAMUSCULAR; INTRAVENOUS EVERY 5 MIN PRN
Status: DISCONTINUED | OUTPATIENT
Start: 2025-02-19 | End: 2025-02-19 | Stop reason: HOSPADM

## 2025-02-19 RX ORDER — ASPIRIN 81 MG/1
81 TABLET ORAL 2 TIMES DAILY
Status: DISCONTINUED | OUTPATIENT
Start: 2025-02-19 | End: 2025-02-21 | Stop reason: HOSPADM

## 2025-02-19 RX ORDER — PROPOFOL 10 MG/ML
INJECTION, EMULSION INTRAVENOUS CONTINUOUS PRN
Status: DISCONTINUED | OUTPATIENT
Start: 2025-02-19 | End: 2025-02-19

## 2025-02-19 RX ORDER — LATANOPROST 50 UG/ML
1 SOLUTION/ DROPS OPHTHALMIC AT BEDTIME
Status: DISCONTINUED | OUTPATIENT
Start: 2025-02-19 | End: 2025-02-21 | Stop reason: HOSPADM

## 2025-02-19 RX ORDER — SODIUM CHLORIDE, SODIUM LACTATE, POTASSIUM CHLORIDE, CALCIUM CHLORIDE 600; 310; 30; 20 MG/100ML; MG/100ML; MG/100ML; MG/100ML
INJECTION, SOLUTION INTRAVENOUS CONTINUOUS
Status: DISCONTINUED | OUTPATIENT
Start: 2025-02-19 | End: 2025-02-21

## 2025-02-19 RX ORDER — CEFAZOLIN SODIUM/WATER 2 G/20 ML
2 SYRINGE (ML) INTRAVENOUS SEE ADMIN INSTRUCTIONS
Status: DISCONTINUED | OUTPATIENT
Start: 2025-02-19 | End: 2025-02-19 | Stop reason: HOSPADM

## 2025-02-19 RX ORDER — ACETAMINOPHEN 325 MG/1
650 TABLET ORAL EVERY 4 HOURS PRN
Qty: 100 TABLET | Refills: 0 | Status: SHIPPED | OUTPATIENT
Start: 2025-02-19

## 2025-02-19 RX ORDER — ATORVASTATIN CALCIUM 10 MG/1
20 TABLET, FILM COATED ORAL DAILY
Status: DISCONTINUED | OUTPATIENT
Start: 2025-02-19 | End: 2025-02-21 | Stop reason: HOSPADM

## 2025-02-19 RX ORDER — HYDROMORPHONE HCL IN WATER/PF 6 MG/30 ML
0.1 PATIENT CONTROLLED ANALGESIA SYRINGE INTRAVENOUS EVERY 4 HOURS PRN
Status: DISCONTINUED | OUTPATIENT
Start: 2025-02-19 | End: 2025-02-20

## 2025-02-19 RX ORDER — ONDANSETRON 2 MG/ML
INJECTION INTRAMUSCULAR; INTRAVENOUS PRN
Status: DISCONTINUED | OUTPATIENT
Start: 2025-02-19 | End: 2025-02-19

## 2025-02-19 RX ADMIN — FENTANYL CITRATE 50 MCG: 50 INJECTION INTRAMUSCULAR; INTRAVENOUS at 07:33

## 2025-02-19 RX ADMIN — BUPIVACAINE HYDROCHLORIDE 2.6 ML: 5 INJECTION, SOLUTION EPIDURAL; INTRACAUDAL; PERINEURAL at 07:32

## 2025-02-19 RX ADMIN — HYDROMORPHONE HYDROCHLORIDE 0.2 MG: 0.2 INJECTION, SOLUTION INTRAMUSCULAR; INTRAVENOUS; SUBCUTANEOUS at 17:23

## 2025-02-19 RX ADMIN — TRANEXAMIC ACID 1950 MG: 650 TABLET ORAL at 06:06

## 2025-02-19 RX ADMIN — SODIUM CHLORIDE, POTASSIUM CHLORIDE, SODIUM LACTATE AND CALCIUM CHLORIDE: 600; 310; 30; 20 INJECTION, SOLUTION INTRAVENOUS at 06:38

## 2025-02-19 RX ADMIN — CEFAZOLIN SODIUM 2 G: 2 INJECTION, SOLUTION INTRAVENOUS at 17:14

## 2025-02-19 RX ADMIN — Medication 2.5 MG: at 09:21

## 2025-02-19 RX ADMIN — HYDROMORPHONE HYDROCHLORIDE 0.2 MG: 0.2 INJECTION, SOLUTION INTRAMUSCULAR; INTRAVENOUS; SUBCUTANEOUS at 19:28

## 2025-02-19 RX ADMIN — FENTANYL CITRATE 25 MCG: 50 INJECTION INTRAMUSCULAR; INTRAVENOUS at 07:37

## 2025-02-19 RX ADMIN — ACETAMINOPHEN 1000 MG: 500 TABLET ORAL at 19:29

## 2025-02-19 RX ADMIN — FENTANYL CITRATE 25 MCG: 50 INJECTION INTRAMUSCULAR; INTRAVENOUS at 07:35

## 2025-02-19 RX ADMIN — PHENYLEPHRINE HYDROCHLORIDE 0.1 MCG/KG/MIN: 10 INJECTION INTRAVENOUS at 07:43

## 2025-02-19 RX ADMIN — PROPOFOL 75 MCG/KG/MIN: 10 INJECTION, EMULSION INTRAVENOUS at 07:43

## 2025-02-19 RX ADMIN — PROPAFENONE HYDROCHLORIDE 150 MG: 150 TABLET, FILM COATED ORAL at 20:47

## 2025-02-19 RX ADMIN — ATORVASTATIN CALCIUM 20 MG: 10 TABLET, FILM COATED ORAL at 20:46

## 2025-02-19 RX ADMIN — Medication 5 MG: at 09:16

## 2025-02-19 RX ADMIN — HYDROXYZINE HYDROCHLORIDE 10 MG: 10 TABLET ORAL at 19:29

## 2025-02-19 RX ADMIN — Medication 2.5 MG: at 09:10

## 2025-02-19 RX ADMIN — ONDANSETRON 4 MG: 2 INJECTION INTRAMUSCULAR; INTRAVENOUS at 08:08

## 2025-02-19 RX ADMIN — TIMOLOL MALEATE 1 DROP: 2.5 SOLUTION/ DROPS OPHTHALMIC at 20:57

## 2025-02-19 RX ADMIN — ASPIRIN 81 MG: 81 TABLET, COATED ORAL at 20:46

## 2025-02-19 RX ADMIN — LEVOTHYROXINE SODIUM 25 MCG: 0.03 TABLET ORAL at 14:59

## 2025-02-19 RX ADMIN — SODIUM CHLORIDE, POTASSIUM CHLORIDE, SODIUM LACTATE AND CALCIUM CHLORIDE: 600; 310; 30; 20 INJECTION, SOLUTION INTRAVENOUS at 09:52

## 2025-02-19 RX ADMIN — SENNOSIDES AND DOCUSATE SODIUM 1 TABLET: 50; 8.6 TABLET ORAL at 20:47

## 2025-02-19 RX ADMIN — LATANOPROST 1 DROP: 50 SOLUTION/ DROPS OPHTHALMIC at 20:44

## 2025-02-19 RX ADMIN — Medication 2 G: at 07:27

## 2025-02-19 RX ADMIN — ACETAMINOPHEN 1000 MG: 500 TABLET ORAL at 14:58

## 2025-02-19 RX ADMIN — OXYCODONE HYDROCHLORIDE 2.5 MG: 5 TABLET ORAL at 16:25

## 2025-02-19 RX ADMIN — OXYCODONE HYDROCHLORIDE 2.5 MG: 5 TABLET ORAL at 18:23

## 2025-02-19 RX ADMIN — MAGNESIUM SULFATE HEPTAHYDRATE 2 G: 40 INJECTION, SOLUTION INTRAVENOUS at 16:14

## 2025-02-19 RX ADMIN — Medication 5 MG: at 08:41

## 2025-02-19 ASSESSMENT — ACTIVITIES OF DAILY LIVING (ADL)
ADLS_ACUITY_SCORE: 34
ADLS_ACUITY_SCORE: 36
ADLS_ACUITY_SCORE: 33
ADLS_ACUITY_SCORE: 33
ADLS_ACUITY_SCORE: 35
ADLS_ACUITY_SCORE: 35
ADLS_ACUITY_SCORE: 34
ADLS_ACUITY_SCORE: 36
ADLS_ACUITY_SCORE: 34
ADLS_ACUITY_SCORE: 34
ADLS_ACUITY_SCORE: 35
ADLS_ACUITY_SCORE: 33
ADLS_ACUITY_SCORE: 34
ADLS_ACUITY_SCORE: 34
ADLS_ACUITY_SCORE: 32
ADLS_ACUITY_SCORE: 34
ADLS_ACUITY_SCORE: 35
ADLS_ACUITY_SCORE: 35

## 2025-02-19 ASSESSMENT — ENCOUNTER SYMPTOMS: DYSRHYTHMIAS: 1

## 2025-02-19 NOTE — INTERVAL H&P NOTE
"I have reviewed the surgical (or preoperative) H&P that is linked to this encounter, and examined the patient. There are no significant changes    Clinical Conditions Present on Arrival:  Clinically Significant Risk Factors Present on Admission                       # Overweight: Estimated body mass index is 27.72 kg/m  as calculated from the following:    Height as of this encounter: 1.613 m (5' 3.5\").    Weight as of this encounter: 72.1 kg (159 lb).       "

## 2025-02-19 NOTE — ANESTHESIA PREPROCEDURE EVALUATION
Anesthesia Pre-Procedure Evaluation    Patient: Alexandra Menezes   MRN: [id number] : 1940        Procedure : Procedure(s):  DIRECT ANTERIOR TOTAL HIP ARTHROPLASTY, LEFT          Past Medical History:   Diagnosis Date    Arrhythmia     take propafenone    Arthritis     Breast cancer (H) 1992    right Mastectomy    CKD (chronic kidney disease) stage 3, GFR 30-59 ml/min (H) 2019    Coronary artery disease     Disease of thyroid gland     hypothyroid    Hypercholesterolemia     Hypertension     Multiple premature ventricular complexes 2019    Sinus bradycardia     frequent PVC's    Squamous cell cancer of skin of hand 2015      Past Surgical History:   Procedure Laterality Date    BREAST SURGERY Right 1992    Mastectomy for Ca    MASTECTOMY Right 1992    PHACOEMULSIFICATION CLEAR CORNEA WITH STANDARD INTRAOCULAR LENS IMPLANT Bilateral     REPLACEMENT TOTAL KNEE Left 2019    Procedure: LEFT TOTAL KNEE ARTHROPLASTY,COMPUTER-ASSIST;  Surgeon: Fede Robert MD;  Location: [facility];  Service: Orthopedics    vitrectomy Left 2023    dislocation of lens; Dr. Borja      No Known Allergies   Social History     Tobacco Use    Smoking status: Never    Smokeless tobacco: Never   Substance Use Topics    Alcohol use: Yes     Alcohol/week: 2.0 standard drinks of alcohol     Types: 2 Standard drinks or equivalent per week     Comment: 1-2 per week      Wt Readings from Last 1 Encounters:   25 72.1 kg (159 lb)        Anesthesia Evaluation   Pt has had prior anesthetic.         ROS/MED HX  ENT/Pulmonary:  - neg pulmonary ROS     Neurologic:  - neg neurologic ROS     Cardiovascular:     (+)  hypertension- -  CAD -  - -                        dysrhythmias, a-fib,             METS/Exercise Tolerance:     Hematologic:       Musculoskeletal:   (+)  arthritis,             GI/Hepatic:       Renal/Genitourinary:     (+) renal disease,             Endo:     (+)          " thyroid problem,     Obesity,       Psychiatric/Substance Use:       Infectious Disease:       Malignancy:   (+) Malignancy, History of Breast.Breast CA Remission status post.      Other:            Physical Exam    Airway  airway exam normal      Mallampati: II   TM distance: > 3 FB   Neck ROM: full   Mouth opening: > 3 cm    Respiratory Devices and Support         Dental       (+) Modest Abnormalities - crowns, retainers, 1 or 2 missing teeth      Cardiovascular   cardiovascular exam normal       Rhythm and rate: regular and normal     Pulmonary   pulmonary exam normal        breath sounds clear to auscultation           OUTSIDE LABS:  CBC:   Lab Results   Component Value Date    WBC 4.8 02/05/2025    WBC 4.1 05/17/2023    HGB 12.2 02/05/2025    HGB 13.6 05/20/2024    HCT 38.3 02/05/2025    HCT 40.8 05/17/2023     02/05/2025     05/17/2023     BMP:   Lab Results   Component Value Date     02/05/2025     05/20/2024    POTASSIUM 4.5 02/05/2025    POTASSIUM 4.1 05/20/2024    CHLORIDE 104 02/05/2025    CHLORIDE 106 05/20/2024    CO2 24 02/05/2025    CO2 22 05/20/2024    BUN 29.0 (H) 02/05/2025    BUN 21.7 05/20/2024    CR 0.99 (H) 02/05/2025    CR 1.0 08/21/2024     (H) 02/05/2025    GLC 84 05/20/2024     COAGS:   Lab Results   Component Value Date    INR 0.97 09/03/2021     POC: No results found for: \"BGM\", \"HCG\", \"HCGS\"  HEPATIC:   Lab Results   Component Value Date    ALBUMIN 4.3 05/20/2024    PROTTOTAL 6.7 05/20/2024    ALT 20 05/20/2024    AST 33 05/20/2024    ALKPHOS 85 05/20/2024    BILITOTAL 0.7 05/20/2024     OTHER:   Lab Results   Component Value Date    LACT 1.1 09/04/2021    A1C 4.9 05/20/2024    MILADYS 9.7 02/05/2025    PHOS 3.8 05/12/2022    MAG 2.0 07/27/2022    TSH 3.29 05/20/2024       Anesthesia Plan    ASA Status:  3    NPO Status:  NPO Appropriate    Anesthesia Type: Spinal.              Consents    Anesthesia Plan(s) and associated risks, benefits, and realistic " "alternatives discussed. Questions answered and patient/representative(s) expressed understanding.     - Discussed:     - Discussed with:  Patient      - Extended Intubation/Ventilatory Support Discussed: No.           Postoperative Care    Pain management: IV analgesics, Oral pain medications.   PONV prophylaxis: Ondansetron (or other 5HT-3), Dexamethasone or Solumedrol, Background Propofol Infusion     Comments:               Amadeo Paz MD    I have reviewed the pertinent notes and labs in the chart from the past 30 days and (re)examined the patient.  Any updates or changes from those notes are reflected in this note.    Clinically Significant Risk Factors Present on Admission                   # Hypertension: Noted on problem list           # Overweight: Estimated body mass index is 27.72 kg/m  as calculated from the following:    Height as of this encounter: 1.613 m (5' 3.5\").    Weight as of this encounter: 72.1 kg (159 lb).                "

## 2025-02-19 NOTE — ANESTHESIA CARE TRANSFER NOTE
Patient: Alexandra Menezes    Procedure: Procedure(s):  DIRECT ANTERIOR TOTAL HIP ARTHROPLASTY, LEFT       Diagnosis: Osteoarthritis of left hip [M16.12]  Diagnosis Additional Information: No value filed.    Anesthesia Type:   Spinal     Note:    Oropharynx: oropharynx clear of all foreign objects and spontaneously breathing  Level of Consciousness: awake  Oxygen Supplementation: face mask  Level of Supplemental Oxygen (L/min / FiO2): 8  Independent Airway: airway patency satisfactory and stable  Dentition: dentition unchanged  Vital Signs Stable: post-procedure vital signs reviewed and stable  Report to RN Given: handoff report given  Patient transferred to: PACU    Handoff Report: Identifed the Patient, Identified the Reponsible Provider, Reviewed the pertinent medical history, Discussed the surgical course, Reviewed Intra-OP anesthesia mangement and issues during anesthesia, Set expectations for post-procedure period and Allowed opportunity for questions and acknowledgement of understanding      Vitals:  Vitals Value Taken Time   BP 93/54 02/19/25 0928   Temp 36.6  C (97.8  F) 02/19/25 0928   Pulse 55 02/19/25 0929   Resp 45 02/19/25 0929   SpO2 100 % 02/19/25 0929   Vitals shown include unfiled device data.    Electronically Signed By: TALAT Doyle CRNA  February 19, 2025  9:30 AM

## 2025-02-19 NOTE — PLAN OF CARE
Goal Outcome Evaluation:    Patient vital signs are at baseline: Yes  Patient able to ambulate as they were prior to admission or with assist devices provided by therapies during their stay:  No,  Reason:  Spinal starting to wear off, continues to have numbness to legs and tingling to feet, now able to wiggle toes and move feet  Patient MUST void prior to discharge:  No,  Reason:  Due to void following straight cath in PACU  Patient able to tolerate oral intake:  Yes  Pain has adequate pain control using Oral analgesics:  Yes, pain mild, scheduled Tylenol given  Does patient have an identified :  Yes  Has goal D/C date and time been discussed with patient:  Yes

## 2025-02-19 NOTE — PHARMACY-ADMISSION MEDICATION HISTORY
Pharmacist Admission Medication History    Admission medication history is complete. The information provided in this note is only as accurate as the sources available at the time of the update.    Information Source(s): Patient and CareEverywhere/SureScripts via in-person    Pertinent Information: has eye drops with for inpatient use    Allergies reviewed with patient and updates made in EHR: yes    Medication History Completed By: Radha Guzman PharmD 2/19/2025 7:06 AM    PTA Med List   Medication Sig Note Last Dose/Taking    acetaminophen (TYLENOL) 500 MG tablet Take 1,000 mg by mouth 3 times daily.  2/18/2025 Bedtime    atorvastatin (LIPITOR) 20 MG tablet TAKE 1 TABLET BY MOUTH EVERY DAY  2/18/2025 Bedtime    calcium carbonate (OS-MILADYS) 500 MG tablet Take 1 tablet by mouth daily.  Past Week    ibuprofen (ADVIL/MOTRIN) 200 MG tablet Take 400 mg by mouth 3 times daily. Takes with acetaminophen  Past Week    ketorolac (ACULAR) 0.5 % ophthalmic solution Place 1 drop Into the left eye 2 times daily 2/19/2025: Has with 2/19/2025 Morning    latanoprost (XALATAN) 0.005 % ophthalmic solution Place 1 drop into both eyes at bedtime. 2/19/2025: Has with 2/18/2025 Bedtime    levothyroxine (SYNTHROID/LEVOTHROID) 25 MCG tablet TAKE 1 TABLET BY MOUTH DAILY AT 6 AM  2/18/2025    omega-3 fatty acids-fish oil (OMEGA 3 FISH OIL) 684-1,200 mg CpDR [OMEGA-3 FATTY ACIDS-FISH OIL (OMEGA 3 FISH OIL) 684-1,200 MG CPDR] Take 2 tablets by mouth every morning.  Past Week    prednisoLONE acetate (PRED FORTE) 1 % ophthalmic suspension Place 1 drop Into the left eye daily  2/19/2025 Morning    propafenone (RYTHMOL) 150 MG TABS tablet Take 1 tablet (150 mg) by mouth 2 times daily.  2/19/2025 Morning    timolol maleate (TIMOPTIC) 0.25 % ophthalmic solution Place 1 drop Into the left eye 2 times daily  2/19/2025 Morning    [DISCONTINUED] latanoprost (XALATAN) 0.005 % ophthalmic solution Place 1 drop into both eyes At Bedtime   Taking

## 2025-02-19 NOTE — OP NOTE
PATIENT:  Alexandra Menezes    DATE OF SURGERY:  2/19/2025     PREOPERATIVE DIAGNOSIS: left hip osteoarthritis.     POSTOPERATIVE DIAGNOSIS: left hip osteoarthritis.     PROCEDURE: left total hip arthroplasty.     SURGEON: Donald Arroyo MD.     FIRST ASSIST: Adin Kebede PA-C.   (Expert RADHA assistant was required throughout   the procedure for patient positioning and limb management as well as appropriate   soft-tissue retraction and use of complex orthopedic instrumentation and patient   Safety)  .   ANESTHESIA: Spinal with sedation.     ESTIMATED BLOOD LOSS: 200 mL     INDICATION: Very pleasant  84 year old-year-old female with history of   ongoing increasing  hip and groin pain. X-rays have shown bone-on-bone   osteoarthritis. Patient has tried and failed all conservative measures. Therefore,   after discussion regarding risks and benefits of left total hip arthroplasty, they   elected to proceed.     IMPLANTS  Depuy, Actis femoral stem, size 5, high offset, 12/14 trunnion.   2. DePuy Rudd Sector 2 acetabular shell, 48 mm outer diameter.   3. DePuy AltrX polyethylene liner, 32 mm inner diam., +4 offset.   4. DePuy Biolox ceramic femoral head, 32 mm outer diameter, 1 mm neck length.     PROCEDURE: Once consent was obtained and operative site marked in preop holding   area, patient brought to operating room and anesthesia established without   difficulty. She was placed in supine position on the Henderson table. Non-operative lower   extremity was padded in the traction boot and no traction was applied. Operative leg was padded in the traction boot as well. The area over the left hip was sterilely prepped and draped in the usual fashion.     A longitudinal incision was made over the anterior portion of the hip. Dissection   carried down through subcutaneous tissue. The fascia encasing the tensor fascia   chavez was incised. I followed the fascia down into the interval bewteen TFL and sartorious medial to  rectus femoris onto to the anterior neck and placed a   retractor around the superior neck. We then identified the transverse branches of   the circumflex artery. These were cauterized. I then placed a retractor on the   inferior neck. The capsule was cleared and then split in H fashion.  The femoral neck   cut was then made. C-arm imaging confirmed the appropriateness of the neck cut.     The head was removed with a corkscrew using traction. The acetabulum was exposed and then reamed sequentially up to a  48mm reamer. The acetabular shell was impacted in   appropriate inclination and version using C-arm guidance.    Final liner was impacted. The femur was then addressed. The femoral hook was   Utilized and care taken to ensure it was placed against the bone. The femur was brought into extension, adduction, External rotation. The cut surface of the femur was then exposed. A standard release was performed. The entry broach was then utilized followed by sequential broaching up to a size 5 broach. This gave excellent fit.  C-arm images confirmed leg lengths with the xray overlay technique. The appropriate size of the broach was also confirmed.   The hip was again dislocated and returned to the adducted, extended, externally rotated position. Broach was removed. Final stem was seated. Final head was impacted and the hip reduced.    Wound was copiously irrigated.  Anterior capsule was closed with #5 Ethibond. The wound was further irrigated and the fascia investing the fascia chavez and sartorius was then closed with #1 Vicryl o taking care to avoid branches of the lateral femoral cutaneus nerve. Deep dermis closed with 2-0 interrupted inverted Vicryl suture   followed by skin closure. Dressings were applied. Patient tolerated procedure well and   was returned to postoperative recovery in stable condition.     DONALD BOLANOS MD    CC1: Donald Bolanos

## 2025-02-19 NOTE — CONSULTS
[facility] MEDICINE PROGRESS NOTE      Identification/Summary: Alexandra Menezes is a 84 year old female with a past medical history of chronic kidney disease stage III, essential hypertension, paroxysmal atrial fibrillation, nonobstructive coronary artery disease, hyperlipidemia, hypothyroidism and high trauma resulting in displacement of cataract lens replacement who was admitted on 2/19/2025 for elective left total hip arthroplasty. Hospital course is unremarkable.    Assessment and Plan:    Hypothyroidism on levothyroxine    Postop low magnesium at 1.6  Plan: IV magnesium and check level in the morning    Essential hypertension history which she has been controlled with lifestyle modification    Paroxysmal atrial fibrillation  Apparently has stayed in with propafenone  Plan: Continue that    History of eye trauma with displacement of cataract lens and then repeat surgery but has chronic need for eyedrops x 4  Plan: Continue home eyedrops    Hyperlipidemia on statin    History of coronary artery disease but imaging showed it to be nonobstructive.  No current medical management    Postop left total hip arthroplasty currently doing well  Plan: Per orthopedic surgical team    DVT prophylaxis: Aspirin twice daily per orthopedic surgical team    Diet: Advance Diet as Tolerated: Regular Diet Adult  Discharge Instruction - Regular Diet Adult  B[place] Catheter: Not present  Lines: None       Code Status: Full Code    Disposition Plan      Expected Discharge Date: 02/20/2025      Destination: home with family (nephew staying with pt after d/c)          The patient's care was discussed with the Patient and Patient's Family.    Keaton Frazier MD  [facility]  [facility]  [employer]  Phone: #[phone]  Securely message with the Vocera Web Console (learn more here)  Text page via [employer]facts Paging/Directory     Interval History/Subjective:  Patient has no concerns  currently.  Denies any nausea.  No chest pain or shortness of breath.  No lightheadedness.  Only thing she mentions that she has no sensation from her waist down.    Physical Exam/Objective:  Temp:  [96.4  F (35.8  C)-98.1  F (36.7  C)] 97.4  F (36.3  C)  Pulse:  [53-68] 64  Resp:  [13-46] 13  BP: ()/(47-77) 133/60  SpO2:  [91 %-100 %] 100 %  Body mass index is 27.72 kg/m .    GENERAL:  Alert, appears comfortable, in no acute distress, appears stated age   HEAD:  Normocephalic, without obvious abnormality, atraumatic   EYES:  PERRL   THROAT: Lips, mucosa, and tongue normal; teeth and gums normal, mouth moist   NECK: Supple, symmetrical, trachea midline   BACK:   No CVA tenderness   LUNGS:   Clear to auscultation bilaterally, no rales, rhonchi, or wheezing, symmetric chest rise on inhalation, respirations unlabored   HEART:  Regular rate and rhythm, no murmur   ABDOMEN:   Soft, non-tender   EXTREMITIES: No ankle edema   SKIN: Dry to touch, no exanthems in the visualized areas   NEURO: Alert, appears cognitively intact, moves all four extremities freely   PSYCH: Cooperative, behavior is appropriate      Data reviewed today: I personally reviewed all new medications, labs, imaging/diagnostics reports over the past 24 hours. Pertinent findings include:    Imaging:   Recent Results (from the past 24 hours)   XR Surgery JOCELYNE  Fluoro G/T 5 Min    Narrative    This exam was marked as non-reportable because it will not be read by a   radiologist or a [place] non-radiologist provider.             Labs:  Most Recent 3 CBC's:  Recent Labs   Lab Test 02/05/25  1516 05/20/24  1536 05/17/23  1421 03/14/23  0749 07/27/22  1103   WBC 4.8  --  4.1  --  4.7   HGB 12.2 13.6 13.0   < > 13.2   MCV 95  --  93  --  92     --  175  --  203    < > = values in this interval not displayed.     Most Recent 3 BMP's:  Recent Labs   Lab Test 02/19/25  1637 02/19/25  1451 02/05/25  1516    138 139   POTASSIUM 4.6 4.5 4.5    CHLORIDE 103 102 104   CO2 24 27 24   BUN 20.1 18.8 29.0*   CR 0.87 0.90 0.99*   ANIONGAP 9 9 11   MILADYS 9.5 9.1 9.7   * 123* 116*     Most Recent 2 LFT's:  Recent Labs   Lab Test 05/20/24  1536 05/17/23  1421   AST 33 31   ALT 20 24   ALKPHOS 85 78   BILITOTAL 0.7 0.5       Medications:   Personally Reviewed.  Medications   Current Facility-Administered Medications   Medication Dose Route Frequency Provider Last Rate Last Admin    lactated ringers infusion   Intravenous Continuous Donald Arroyo MD 75 mL/hr at 02/19/25 1600 Rate Verify at 02/19/25 1600     Current Facility-Administered Medications   Medication Dose Route Frequency Provider Last Rate Last Admin    acetaminophen (TYLENOL) tablet 1,000 mg  1,000 mg Oral TID Keaton Frazier MD   1,000 mg at 02/19/25 1458    acetaminophen (TYLENOL) tablet 975 mg  975 mg Oral Q8H Donald Arroyo MD        aspirin EC tablet 81 mg  81 mg Oral BID Donald Arroyo MD        atorvastatin (LIPITOR) tablet 20 mg  20 mg Oral Daily Keaton Frazier MD        ceFAZolin (ANCEF) 2 g in 100 mL D5W intermittent infusion  2 g Intravenous Q8H Donald Arroyo MD        ketorolac (ACULAR) 0.5 % ophthalmic solution 1 drop  1 drop Left Eye BID Keaton Frazier MD        latanoprost (XALATAN) 0.005 % ophthalmic solution 1 drop  1 drop Both Eyes At Bedtime Keaton Frazier MD        levothyroxine (SYNTHROID/LEVOTHROID) tablet 25 mcg  25 mcg Oral Daily Keaton Frazier MD   25 mcg at 02/19/25 1459    magnesium sulfate 2 g in 50 mL sterile water intermittent infusion  2 g Intravenous Once Keaton Frazier MD 50 mL/hr at 02/19/25 1614 2 g at 02/19/25 1614    [START ON 2/20/2025] polyethylene glycol (MIRALAX) Packet 17 g  17 g Oral Daily Donald Arroyo MD        [START ON 2/20/2025] prednisoLONE acetate (PRED FORTE) 1 % ophthalmic susp 1 drop  1 drop Left Eye Daily Keaton Frazier MD        propafenone (RYTHMOL) tablet 150 mg  150  mg Oral BID Keaton Frazier MD        senna-docusate (SENOKOT-S/PERICOLACE) 8.6-50 MG per tablet 1 tablet  1 tablet Oral BID Donald Arroyo MD        sodium chloride (PF) 0.9% PF flush 3 mL  3 mL Intracatheter Q8H Donald Arroyo MD        timolol maleate (TIMOPTIC) 0.25 % ophthalmic solution 1 drop  1 drop Left Eye BID Keaton Frazier MD

## 2025-02-19 NOTE — ANESTHESIA POSTPROCEDURE EVALUATION
Patient: Alexandra Menezes    Procedure: Procedure(s):  DIRECT ANTERIOR TOTAL HIP ARTHROPLASTY, LEFT       Anesthesia Type:  Spinal    Note:  Disposition: Admission   Postop Pain Control: Uneventful            Sign Out: Well controlled pain   PONV: No   Neuro/Psych: Uneventful            Sign Out: Acceptable/Baseline neuro status   Airway/Respiratory: Uneventful            Sign Out: Acceptable/Baseline resp. status   CV/Hemodynamics: Uneventful            Sign Out: Acceptable CV status; No obvious hypovolemia; No obvious fluid overload   Other NRE: NONE   DID A NON-ROUTINE EVENT OCCUR? No           Last vitals:  Vitals Value Taken Time   /56 02/19/25 1151   Temp 36.7  C (98.1  F) 02/19/25 1020   Pulse 56 02/19/25 1153   Resp 32 02/19/25 1022   SpO2 100 % 02/19/25 1153   Vitals shown include unfiled device data.    Electronically Signed By: Amadeo Paz MD  February 19, 2025  11:55 AM

## 2025-02-19 NOTE — ANESTHESIA PROCEDURE NOTES
"Intrathecal injection Procedure Note    Pre-Procedure   Staff -        Anesthesiologist:  Amadeo Paz MD       Performed By: anesthesiologist       Location: OR       Procedure Start/Stop Times: 2/19/2025 7:32 AM and 2/19/2025 7:35 AM       Pre-Anesthestic Checklist: patient identified, IV checked, risks and benefits discussed, informed consent, monitors and equipment checked, pre-op evaluation and at physician/surgeon's request  Timeout:       Correct Patient: Yes        Correct Procedure: Yes        Correct Site: Yes        Correct Position: Yes   Procedure Documentation  Procedure: intrathecal injection         Patient Position: sitting       Patient Prep/Sterile Barriers: sterile gloves, mask, patient draped       Skin prep: Chloraprep       Insertion Site: L3-4. (midline approach).       Needle Gauge: 24.        Needle Length (Inches): 4        Spinal Needle Type: Pencan       Introducer used       Introducer: 20 G       # of attempts: 1 and  # of redirects:  0    Assessment/Narrative         Paresthesias: No.       CSF fluid: clear.       Opening pressure was cmH2O while  Sitting.      Medication(s) Administered   0.5% Bupivacaine PF (Intrathecal) - Intrathecal   2.6 mL - 2/19/2025 7:32:00 AM  Medication Administration Time: 2/19/2025 7:32 AM      FOR [facility] ([facility]) ONLY:   Pain Team Contact information: please page the Pain Team Via PlayDo. Search \"Pain\". During daytime hours, please page the attending first. At night please page the resident first.      "

## 2025-02-20 ENCOUNTER — APPOINTMENT (OUTPATIENT)
Dept: PHYSICAL THERAPY | Facility: CLINIC | Age: 85
DRG: 470 | End: 2025-02-20
Attending: ORTHOPAEDIC SURGERY
Payer: COMMERCIAL

## 2025-02-20 ENCOUNTER — APPOINTMENT (OUTPATIENT)
Dept: OCCUPATIONAL THERAPY | Facility: CLINIC | Age: 85
DRG: 470 | End: 2025-02-20
Attending: ORTHOPAEDIC SURGERY
Payer: COMMERCIAL

## 2025-02-20 VITALS
SYSTOLIC BLOOD PRESSURE: 107 MMHG | OXYGEN SATURATION: 96 % | HEIGHT: 64 IN | BODY MASS INDEX: 27.14 KG/M2 | HEART RATE: 65 BPM | RESPIRATION RATE: 16 BRPM | DIASTOLIC BLOOD PRESSURE: 53 MMHG | TEMPERATURE: 97.8 F | WEIGHT: 159 LBS

## 2025-02-20 PROBLEM — M16.12 PRIMARY OSTEOARTHRITIS OF LEFT HIP: Status: ACTIVE | Noted: 2024-11-06

## 2025-02-20 LAB
FASTING STATUS PATIENT QL REPORTED: ABNORMAL
GLUCOSE SERPL-MCNC: 108 MG/DL (ref 70–99)
HGB BLD-MCNC: 11.2 G/DL (ref 11.7–15.7)
MAGNESIUM SERPL-MCNC: 1.7 MG/DL (ref 1.7–2.3)

## 2025-02-20 PROCEDURE — 97166 OT EVAL MOD COMPLEX 45 MIN: CPT | Mod: GO | Performed by: OCCUPATIONAL THERAPIST

## 2025-02-20 PROCEDURE — 99232 SBSQ HOSP IP/OBS MODERATE 35: CPT | Performed by: FAMILY MEDICINE

## 2025-02-20 PROCEDURE — 97161 PT EVAL LOW COMPLEX 20 MIN: CPT | Mod: GP

## 2025-02-20 PROCEDURE — 83735 ASSAY OF MAGNESIUM: CPT | Performed by: FAMILY MEDICINE

## 2025-02-20 PROCEDURE — 250N000013 HC RX MED GY IP 250 OP 250 PS 637

## 2025-02-20 PROCEDURE — 250N000013 HC RX MED GY IP 250 OP 250 PS 637: Performed by: ORTHOPAEDIC SURGERY

## 2025-02-20 PROCEDURE — 250N000013 HC RX MED GY IP 250 OP 250 PS 637: Performed by: PAIN MEDICINE

## 2025-02-20 PROCEDURE — 250N000013 HC RX MED GY IP 250 OP 250 PS 637: Performed by: FAMILY MEDICINE

## 2025-02-20 PROCEDURE — 36415 COLL VENOUS BLD VENIPUNCTURE: CPT | Performed by: ORTHOPAEDIC SURGERY

## 2025-02-20 PROCEDURE — 82947 ASSAY GLUCOSE BLOOD QUANT: CPT | Performed by: ORTHOPAEDIC SURGERY

## 2025-02-20 PROCEDURE — 250N000011 HC RX IP 250 OP 636: Mod: JZ | Performed by: ORTHOPAEDIC SURGERY

## 2025-02-20 PROCEDURE — 85018 HEMOGLOBIN: CPT | Performed by: ORTHOPAEDIC SURGERY

## 2025-02-20 PROCEDURE — 97535 SELF CARE MNGMENT TRAINING: CPT | Mod: GO | Performed by: OCCUPATIONAL THERAPIST

## 2025-02-20 PROCEDURE — 97110 THERAPEUTIC EXERCISES: CPT | Mod: GP

## 2025-02-20 PROCEDURE — 120N000001 HC R&B MED SURG/OB

## 2025-02-20 PROCEDURE — 97116 GAIT TRAINING THERAPY: CPT | Mod: GP

## 2025-02-20 PROCEDURE — 99223 1ST HOSP IP/OBS HIGH 75: CPT | Mod: AI | Performed by: PAIN MEDICINE

## 2025-02-20 RX ORDER — OXYCODONE HYDROCHLORIDE 5 MG/1
5 TABLET ORAL EVERY 4 HOURS PRN
Status: DISCONTINUED | OUTPATIENT
Start: 2025-02-20 | End: 2025-02-20

## 2025-02-20 RX ORDER — HYDROMORPHONE HYDROCHLORIDE 2 MG/1
2 TABLET ORAL
Status: DISCONTINUED | OUTPATIENT
Start: 2025-02-20 | End: 2025-02-21 | Stop reason: HOSPADM

## 2025-02-20 RX ORDER — LIDOCAINE 4 G/G
2 PATCH TOPICAL
Status: DISCONTINUED | OUTPATIENT
Start: 2025-02-20 | End: 2025-02-21 | Stop reason: HOSPADM

## 2025-02-20 RX ORDER — OXYCODONE HYDROCHLORIDE 5 MG/1
10 TABLET ORAL EVERY 4 HOURS PRN
Status: DISCONTINUED | OUTPATIENT
Start: 2025-02-20 | End: 2025-02-20

## 2025-02-20 RX ADMIN — OXYCODONE 10 MG: 5 TABLET ORAL at 12:18

## 2025-02-20 RX ADMIN — TIMOLOL MALEATE 1 DROP: 2.5 SOLUTION/ DROPS OPHTHALMIC at 08:03

## 2025-02-20 RX ADMIN — HYDROMORPHONE HYDROCHLORIDE 0.2 MG: 0.2 INJECTION, SOLUTION INTRAMUSCULAR; INTRAVENOUS; SUBCUTANEOUS at 07:59

## 2025-02-20 RX ADMIN — ACETAMINOPHEN 1000 MG: 500 TABLET ORAL at 19:58

## 2025-02-20 RX ADMIN — LEVOTHYROXINE SODIUM 25 MCG: 0.03 TABLET ORAL at 08:02

## 2025-02-20 RX ADMIN — HYDROMORPHONE HYDROCHLORIDE 2 MG: 2 TABLET ORAL at 19:58

## 2025-02-20 RX ADMIN — SENNOSIDES AND DOCUSATE SODIUM 1 TABLET: 50; 8.6 TABLET ORAL at 19:58

## 2025-02-20 RX ADMIN — ASPIRIN 81 MG: 81 TABLET, COATED ORAL at 19:59

## 2025-02-20 RX ADMIN — TIMOLOL MALEATE 1 DROP: 2.5 SOLUTION/ DROPS OPHTHALMIC at 21:43

## 2025-02-20 RX ADMIN — PROPAFENONE HYDROCHLORIDE 150 MG: 150 TABLET, FILM COATED ORAL at 19:59

## 2025-02-20 RX ADMIN — POLYETHYLENE GLYCOL 3350 17 G: 17 POWDER, FOR SOLUTION ORAL at 08:06

## 2025-02-20 RX ADMIN — CEFAZOLIN SODIUM 2 G: 2 INJECTION, SOLUTION INTRAVENOUS at 00:24

## 2025-02-20 RX ADMIN — ATORVASTATIN CALCIUM 20 MG: 10 TABLET, FILM COATED ORAL at 19:59

## 2025-02-20 RX ADMIN — ACETAMINOPHEN 1000 MG: 500 TABLET ORAL at 08:02

## 2025-02-20 RX ADMIN — OXYCODONE 5 MG: 5 TABLET ORAL at 04:30

## 2025-02-20 RX ADMIN — LIDOCAINE 2 PATCH: 4 PATCH TOPICAL at 14:41

## 2025-02-20 RX ADMIN — PREDNISOLONE ACETATE 1 DROP: 10 SUSPENSION/ DROPS OPHTHALMIC at 08:02

## 2025-02-20 RX ADMIN — LATANOPROST 1 DROP: 50 SOLUTION/ DROPS OPHTHALMIC at 21:43

## 2025-02-20 RX ADMIN — HYDROMORPHONE HYDROCHLORIDE 2 MG: 2 TABLET ORAL at 15:38

## 2025-02-20 RX ADMIN — ASPIRIN 81 MG: 81 TABLET, COATED ORAL at 08:02

## 2025-02-20 RX ADMIN — SENNOSIDES AND DOCUSATE SODIUM 1 TABLET: 50; 8.6 TABLET ORAL at 08:02

## 2025-02-20 RX ADMIN — ACETAMINOPHEN 1000 MG: 500 TABLET ORAL at 14:41

## 2025-02-20 RX ADMIN — PROPAFENONE HYDROCHLORIDE 150 MG: 150 TABLET, FILM COATED ORAL at 08:02

## 2025-02-20 RX ADMIN — KETOROLAC TROMETHAMINE 1 DROP: 5 SOLUTION OPHTHALMIC at 08:02

## 2025-02-20 ASSESSMENT — ACTIVITIES OF DAILY LIVING (ADL)
ADLS_ACUITY_SCORE: 39
ADLS_ACUITY_SCORE: 36
ADLS_ACUITY_SCORE: 35
ADLS_ACUITY_SCORE: 40
ADLS_ACUITY_SCORE: 37
ADLS_ACUITY_SCORE: 36
ADLS_ACUITY_SCORE: 37
ADLS_ACUITY_SCORE: 36
ADLS_ACUITY_SCORE: 37
ADLS_ACUITY_SCORE: 35
ADLS_ACUITY_SCORE: 39
ADLS_ACUITY_SCORE: 36
ADLS_ACUITY_SCORE: 37
ADLS_ACUITY_SCORE: 40
ADLS_ACUITY_SCORE: 36
ADLS_ACUITY_SCORE: 36
ADLS_ACUITY_SCORE: 38
ADLS_ACUITY_SCORE: 40
ADLS_ACUITY_SCORE: 37
ADLS_ACUITY_SCORE: 38
ADLS_ACUITY_SCORE: 40
ADLS_ACUITY_SCORE: 36
ADLS_ACUITY_SCORE: 37

## 2025-02-20 NOTE — PLAN OF CARE
Problem: Hip Arthroplasty  Goal: Optimal Coping  Outcome: Progressing   Goal Outcome Evaluation:    Patient vital signs are at baseline: Yes  Patient able to ambulate as they were prior to admission or with assist devices provided by therapies during their stay:  No,  Reason:  Pt refused to ambulate.    Patient MUST void prior to discharge:  Yes, via purewick.   Patient able to tolerate oral intake:  Yes  Pain has adequate pain control using Oral analgesics:  Yes  Does patient have an identified :  Yes  Has goal D/C date and time been discussed with patient:  Yes

## 2025-02-20 NOTE — PROGRESS NOTES
02/20/25 0812   Appointment Info   Signing Clinician's Name / Credentials (PT) Aakash Canada, PT   Quick Adds   Quick Adds Certification   Living Environment   People in Home other relative(s)   Current Living Arrangements house   Home Accessibility stairs to enter home;stairs within home   Number of Stairs, Main Entrance 2   Stair Railings, Main Entrance railings safe and in good condition   Number of Stairs, Within Home, Primary none   Stair Railings, Within Home, Primary railings safe and in good condition   Self-Care   Usual Activity Tolerance excellent   Current Activity Tolerance fair   Equipment Currently Used at Home cane, straight;commode chair;raised toilet seat;shower chair;tub bench;walker, standard   Fall history within last six months no   Activity/Exercise/Self-Care Comment Indep with all ADL's and I ADL's   General Information   Onset of Illness/Injury or Date of Surgery 02/20/25   Referring Physician Dr. Arroyo   Patient/Family Therapy Goals Statement (PT) none stated   Pertinent History of Current Problem (include personal factors and/or comorbidities that impact the POC) s/p L DANAE   84 year old female with a past medical history of chronic kidney disease stage III, essential hypertension, paroxysmal atrial fibrillation, nonobstructive coronary artery disease, hyperlipidemia, hypothyroidism and high trauma resulting in displacement of cataract lens replacement who was admitted on 2/19/2025 for elective left total hip arthroplasty. Hospital course is unremarkable.   Existing Precautions/Restrictions no known precautions/restrictions   Weight-Bearing Status - LLE weight-bearing as tolerated   Cognition   Affect/Mental Status (Cognition) WFL   Range of Motion (ROM)   ROM Comment decreased ROM s/p L DANAE   Strength (Manual Muscle Testing)   Strength Comments L quad limited post op   Transfers   Transfers sit-stand transfer   Sit-Stand Transfer   Sit-Stand Willis (Transfers) contact  guard;verbal cues   Assistive Device (Sit-Stand Transfers) walker, front-wheeled   Gait/Stairs (Locomotion)   [place] Level (Gait) verbal cues;contact guard   Assistive Device (Gait) walker, front-wheeled   Distance in Feet (Gait) 10   Pattern (Gait) step-through   Deviations/Abnormal Patterns (Gait) neetu decreased;gait speed decreased;antalgic   Balance   Balance no deficits were identified   Clinical Impression   Criteria for Skilled Therapeutic Intervention Yes, treatment indicated   PT Diagnosis (PT) impaired functional mobility   Influenced by the following impairments pain, decreased ROM, impaired balance, decreased strength   Functional limitations due to impairments gait, stairs, transfers   Clinical Presentation (PT Evaluation Complexity) stable   Clinical Presentation Rationale pt presents as medically diagnosed   Clinical Decision Making (Complexity) low complexity   Planned Therapy Interventions (PT) gait training;home exercise program;patient/family education;stair training;transfer training   Risk & Benefits of therapy have been explained care plan/treatment goals reviewed;patient   PT Total Evaluation Time   PT Eval, Low Complexity Minutes (88390) 10   Therapy Certification   Start of care date 02/20/25   Certification date from 02/20/25   Certification date to 02/20/25   Physical Therapy Goals   PT Frequency Daily   PT Predicted Duration/Target Date for Goal Attainment 02/22/25   PT Goals PT Goal 1;Transfers;Gait;Stairs   PT: Transfers Supervision/stand-by assist;Sit to/from stand   PT: Gait Supervision/stand-by assist;Rolling walker;50 feet   PT: Stairs Minimal assist;2 stairs;Rail on both sides   PT: Goal 1 Independent with written HEP for LE strengthening and ROM   Interventions   Interventions Quick Adds Therapeutic Procedure;Therapeutic Activity;Gait Training   Therapeutic Procedure/Exercise   Ther. Procedure: strength, endurance, ROM, flexibillity Minutes (44466) 10   Symptoms Noted  During/After Treatment increased pain   Treatment Detail/Skilled Intervention DANAE protocol therex x10 reps, cueing for technique, Independent with written HEP following education   Therapeutic Activity   Treatment Detail/Skilled Intervention sit to/from stand, cueing for safe hand placement and LE positioning,  SBA   Gait Training   Gait Training Minutes (94719) 18   Symptoms Noted During/After Treatment (Gait Training) increased pain   Treatment Detail/Skilled Intervention vc for step to gait, patient able to move to reciprocal gait toward end of session. Able to go up/down 2 steps with vc  for non recip gaits.  Gait is stable , no lob , just reporting high level of pain in hip joint   Distance in Feet 40, 10,5   Lassen Level (Gait Training) stand-by assist   Physical Assistance Level (Gait Training) supervision;verbal cues;1 person assist   Weight Bearing (Gait Training) weight-bearing as tolerated   Assistive Device (Gait Training) rolling walker   Pattern Analysis (Gait Training) swing-through gait   Gait Analysis Deviations decreased neetu;decreased step length;decreased stride length;decreased toe-to-floor clearance   Impairments (Gait Analysis/Training) pain   Stair Railings present at both sides   Physical Assist/Nonphysical Assist (Stairs) 1 person assist;verbal cues;supervision   Level of Lassen (Stairs) contact guard   PT Discharge Planning   PT Plan progress gait/transfers/stairs, review HEP   PT Discharge Recommendation (DC Rec) other (see comments)  (per ortho MD)   PT Rationale for DC Rec Patient  has good home setup and support   PT Brief overview of current status Paitent SBA for  with gait /transfers, c/o high level of pain, gait is stable, able to go up/down 2 steps with cga   PT Total Distance Amb During Session (feet) 65   PT Equipment Needed at Discharge cane, straight;walker, rolling   Physical Therapy Time and Intention   Timed Code Treatment Minutes 28   Total Session Time (sum  of timed and untimed services) 38   [facility]                                                                                   OUTPATIENT PHYSICAL THERAPY    PLAN OF TREATMENT FOR OUTPATIENT REHABILITATION   Patient's Last Name, First Name, MARTHA MenezesAlexandra  NIKKIE YOB: 1940   Provider's Name   [facility]   Medical Record No.  [id number]     Onset Date: 02/20/25 Start of Care Date: 02/20/25     Medical Diagnosis:                  PT Diagnosis:  impaired functional mobility Certification Dates:  From: 02/20/25  To: 02/20/25       See note for plan of treatment, functional goals, and certification details.    I CERTIFY THE NEED FOR THESE SERVICES FURNISHED UNDER        THIS PLAN OF TREATMENT AND WHILE UNDER MY CARE (Physician co-signature of this document indicates review and certification of the therapy plan).

## 2025-02-20 NOTE — PROGRESS NOTES
"Orthopedic Progress Note      Assessment: 1 Day Post-Op  S/P Procedure(s):  DIRECT ANTERIOR TOTAL HIP ARTHROPLASTY, LEFT     Plan:   - Continue PT/OT  - Weightbearing status: WBAT  - Anticoagulation: ASA 81 mg BID x 1 month in addition to SCDs, barry stockings and early ambulation.  - Pain control: multimodal regimen, minimize IV narcotics. Will increase oxycodone to 5 mg q4h prn for moderate pain and 10 mg q4h prn for severe pain.   - Labs: Hgb 11.2 this AM. Transfuse if < 7.0. No indication for transfusion at this time.   - Follow up: 2 weeks in clinic with Dr. Arroyo's team for post-op visit   - Discharge planning: pending progression with therapy and pain control. Most likely home tomorrow.       Subjective:  Pain: severe  Chest pain, SOB: denies  Nausea, Vomiting:  denies  Lightheadedness, Dizziness:  denies  Neuro:  Patient denies new onset numbness or paresthesias    Patient reports a significant amount of pain this morning. She currently rates it a 9.5/10. She localizes pain to the anterior and lateral hip. She has been taking oxycodone 2.5-5 mg q4h and also received IV dilaudid this morning. Tolerating oral intake. Voiding without difficulty. Passing gas, no BM yet. Worked with PT/OT this morning.       Objective:  BP (!) 149/65 (BP Location: Left arm)   Pulse 68   Temp 98.5  F (36.9  C) (Oral)   Resp 16   Ht 1.613 m (5' 3.5\")   Wt 72.1 kg (159 lb)   SpO2 97%   BMI 27.72 kg/m    The patient is A&Ox3. Sitting up in chair.   Sensation is intact of bilateral lower extremities.   Dorsiflexion and plantar flexion is intact.  Posterior tibial and dorsalis pedis pulses intact.  Calves are soft and non-tender. Negative Emmanuel's.  Tender to palpation of the anterior and lateral left hip.   No ecchymosis or erythema surrounding the incision.   The right anterior hip incision is covered with Mepilex. Dressing C/D/I. No signs of infection.      Pertinent Labs   Lab Results: personally reviewed.   Lab Results "   Component Value Date    INR 0.97 09/03/2021    INR 0.93 03/21/2019     Lab Results   Component Value Date    WBC 4.8 02/05/2025    HGB 11.2 (L) 02/20/2025    HCT 38.3 02/05/2025    MCV 95 02/05/2025     02/05/2025     Lab Results   Component Value Date     02/19/2025    CO2 24 02/19/2025         Report completed by:  Amberly Neil PA-C/Dr. [facility]    Date: 2/20/2025  Time: 8:18 AM

## 2025-02-20 NOTE — PROGRESS NOTES
02/20/25 0912   Appointment Info   Signing Clinician's Name / Credentials (OT) Rabia Montenegro OTR/L   Rehab Comments (OT) OT TY   Quick Adds   Quick Adds Certification   Living Environment   People in Home other (see comments)  (Pt has family that will help upon dc)   Current Living Arrangements other (see comments)  (Townhome/duplex with sister next door; her nephew also lives next)   Transportation Anticipated family or friend will provide;car, drives self   Living Environment Comments WIS with built-in seat; also has a shower bench she will put in, no grab bars. RTS on toilet   Self-Care   Usual Activity Tolerance good   Current Activity Tolerance moderate   Regular Exercise Yes   Activity/Exercise Type walking   Exercise Amount/Frequency 2 times/wk  (Was walking up to 2 miles, then started swimming when LE got bad)   Equipment Currently Used at Home cane, straight;walker, rolling;shower chair;raised toilet seat   Fall history within last six months no   Activity/Exercise/Self-Care Comment Pt IND w/ ADLs and IADLs at baseline   General Information   Onset of Illness/Injury or Date of Surgery 02/19/25   Referring Physician Donald Arroyo   Patient/Family Therapy Goal Statement (OT) Pt feels unsure about going home.   Additional Occupational Profile Info/Pertinent History of Current Problem Alexandra Menezes is a 84 year old female with a past medical history of chronic kidney disease stage III, essential hypertension, paroxysmal atrial fibrillation, nonobstructive coronary artery disease, hyperlipidemia, hypothyroidism and high trauma resulting in displacement of cataract lens replacement who was admitted on 2/19/2025 for elective left total hip arthroplasty. Hospital course is unremarkable.   Left Lower Extremity (Weight-bearing Status) weight-bearing as tolerated (WBAT)   Cognitive Status Examination   Orientation Status orientation to person, place and time   Affect/Mental Status (Cognitive) WNL   Visual  Perception   Visual Impairment/Limitations WFL   Sensory   Sensory Quick Adds sensation intact   Pain Assessment   Patient Currently in Pain Yes, see Vital Sign flowsheet   Posture   Posture not impaired   Range of Motion Comprehensive   General Range of Motion no range of motion deficits identified   Strength Comprehensive (MMT)   General Manual Muscle Testing (MMT) Assessment no strength deficits identified   Bed Mobility   Bed Mobility supine-sit;sit-supine   Comment (Bed Mobility) SBA-CGA   Transfers   Transfers bed-chair transfer;sit-stand transfer;toilet transfer;shower transfer   Transfer Comments SBA-CGA   Activities of Daily Living   BADL Assessment/Intervention lower body dressing;toileting;bathing   Bathing Assessment/Intervention   Meade Level (Bathing) lower body;moderate assist (50% patient effort)   Lower Body Dressing Assessment/Training   Meade Level (Lower Body Dressing) lower body dressing skills;maximum assist (25% patient effort)   Toileting   Meade Level (Toileting) adjust/manage clothing;supervision   Clinical Impression   Criteria for Skilled Therapeutic Interventions Met (OT) Yes, treatment indicated   OT Diagnosis decreased ADLs   Influenced by the following impairments DANAE   OT Problem List-Impairments impacting ADL activity tolerance impaired;mobility;pain;post-surgical precautions   Assessment of Occupational Performance 3-5 Performance Deficits   Identified Performance Deficits LE dressing/bathing, bed mobility, all transfers, toileting   Planned Therapy Interventions (OT) ADL retraining;bed mobility training;transfer training   Clinical Decision Making Complexity (OT) detailed assessment/moderate complexity   Risk & Benefits of therapy have been explained evaluation/treatment results reviewed;patient   OT Total Evaluation Time   OT Eval, Moderate Complexity Minutes (01383) 10   Therapy Certification   Medical Diagnosis DANAE   Start of Care Date 02/20/25    Certification date from 02/20/25   Certification date to 02/20/25   OT Goals   Therapy Frequency (OT) One time eval and treatment   OT Predicted Duration/Target Date for Goal Attainment 02/20/25   OT Goals Lower Body Dressing;Bed Mobility;Transfers;Toilet Transfer/Toileting   OT: Lower Body Dressing Supervision/stand-by assist;using adaptive equipment;Completed  (GOAL MET for everything but donning L shoe)   OT: Bed Mobility Modified independent;supine to/from sitting;Goal Met  (using leg )   OT: Transfer with assistive device;Supervision/stand-by assist;Goal Met  (Shower transfer)   OT: Toilet Transfer/Toileting Modified independent;toilet transfer;cleaning and garment management;Goal Met   Interventions   Interventions Quick Adds Self-Care/Home Management   Self-Care/Home Management   Self-Care/Home Mgmt/ADL, Compensatory, Meal Prep Minutes (98798) 43   Symptoms Noted During/After Treatment (Meal Preparation/Planning Training) increased pain   Treatment Detail/Skilled Intervention Pt edu on LACK of hip px - demonstrated ability to complete ADLs using reacher with a lot of extra time due to pain/decreased mobility. Pt edu on compensatory strategies for LE dressing using reacher and sock aid, LHSH - completed Mod  I for everything but L shoe (min A) w/ AE and extended time. STS w/ SBA and FWW. Pt amb. 15 ft to BR w/ FWW Mod I. Edu on toilet/walkin shower transfer w/ grab bars - completed each Mod I. Pt instructed on bed mobility techniques - completed sit<>supine Mod I using leg . Pt edu on sleeping position,LE bathing techniques, and car transfers - pt verbalized understanding.   OT Discharge Planning   OT Plan DC OT   OT Discharge Recommendation (DC Rec) other (see comments)  (defer to ortho)   OT Rationale for DC Rec Pt is moving very slow and is self-limiting due to fear of pain, however she is able to complete all self-care with extra time and use of AE. She has family that are available to  assist, but do not live with her.   OT Brief overview of current status Mod indep all ADL except donning L shoe; extra time required for all movement.   OT Total Distance Amb During Session (feet) 40   Total Session Time   Timed Code Treatment Minutes 43   Total Session Time (sum of timed and untimed services) 53   M [facility]                                                                                   OUTPATIENT OCCUPATIONAL THERAPY    PLAN OF TREATMENT FOR OUTPATIENT REHABILITATION   Patient's Last Name, First Name, GIOVANNIKAY  Alexandra Menezes YOB: 1940   Provider's Name   [facility]   Medical Record No.  [id number]     Onset Date: 02/19/25 Start of Care Date: 02/20/25     Medical Diagnosis:  DANAE               OT Diagnosis:  decreased ADLs Certification Dates:  From: 02/20/25  To: 02/20/25     See note for plan of treatment, functional goals, and certification details.    I CERTIFY THE NEED FOR THESE SERVICES FURNISHED UNDER        THIS PLAN OF TREATMENT AND WHILE UNDER MY CARE (Physician co-signature of this document indicates review and certification of the therapy plan).

## 2025-02-20 NOTE — CONSULTS
[facility] ACUTE INPATIENT PAIN SERVICE    [facility], [facility], [facility], [facility], [place]   PAIN Consult      Assessment/Plan:  Alexandra Menezes is a 84 year old female who was admitted on 2/19/2025.  I was asked by Dr. Frazier, Keaton Mullins MD  to see the patient for poorly controlled postop pain. Admitted for planned hip surgery. History of osteoarthritis of the left hip, CKD 3, HTN, A-fib, CAD, HLD, hypothyroidism.    shows no results. Describes pain as 10/10 and in the last 24 hours utilized 10 mg of oxycodone and 0.4 mg IV Dilaudid in total..      PLAN:  Acute pain secondary to anterior total hip arthroplasty on the left, in the setting of advanced age of 84 and opioid naïve status.   Given severity of pain, inability to perform physical therapy suggest offering opioid rotation from oxycodone to p.o. Dilaudid.  Multimodal Medication Therapy:   Adjuvants: Continue Tylenol increased to every 6 hours, add lidocaine   Opioids: discontinue oxycodone, trial po dilaudid 2mg Q3h PRN  IV dilaudid 0.2mg PRN   Non-medication interventions- Ice  Constipation Prophylaxis- senna  Follow up /Discharge Recommendations - We recommend prescribing the following at the time of discharge: 100 MME at the time of discharge          Subjective:    Today I met with Alexandra at the bedside.  She is sleeping soundly.  I called her name 3 times and she does wake up.  She reports her pain is 10/10.  Primarily in the posterior left buttocks and hip.  She tried 10 mg of oxycodone about 1 hour ago.  And pain is still severe.  She states she is unable to lift her leg at all due to pain in the left hip.  Prior to surgery she did not take any opioids was taking Tylenol at baseline.  Pain currently is stabbing.  Denies muscle spasms.  Denies chest pain.  Denies nausea.  We talked about an opioid rotation and she is open to this.  Inquired if any medications have been helpful for her in the past and she is not aware of any.  We  "talked about the timing of medications as well as asking for as needed medications.    Discussed with nurse as well.  Regarding pain medication changes.      Reviewed medication administration record.  Patient received IV Dilaudid at 7 PM last night.  Then did not receive pain medication again until 4:30 AM.  Had IV dose of pain medication at 8 AM.  And then had oral oxycodone at 1218 today.      as follows: Results    History   Drug Use No         Tobacco Use      Smoking status: Never      Smokeless tobacco: Never        Objective:  Vital signs in last 24 hours:  B/P: 149/65, T: 98.5, P: 68, R: 16   Blood pressure (!) 149/65, pulse 68, temperature 98.5  F (36.9  C), temperature source Oral, resp. rate 16, height 1.613 m (5' 3.5\"), weight 72.1 kg (159 lb), SpO2 97%, not currently breastfeeding.        Review of Systems:   As per subjective, all others negative.    Physical Exam    General: in no apparent distress and non-toxic    HEENT: Head normocephalic atraumatic, oral mucosa moist. Sclerae anicteric  CV: Chest pain  Resp: No SOB  Skin: No rashes or lesions, slight amount of edema on left lower extremity  Extremities: No peripheral edema  Psych: Normal affect, mood euthymic  Neuro: Left lower extremity weakness          Imaging:  Personally Reviewed.    No results found for this visit on 02/19/25.     Lab Results:  Personally Reviewed.   Last Comprehensive Metabolic Panel:  Sodium   Date Value Ref Range Status   02/19/2025 136 135 - 145 mmol/L Final     Potassium   Date Value Ref Range Status   02/19/2025 4.6 3.4 - 5.3 mmol/L Final   05/12/2022 4.6 3.5 - 5.0 mmol/L Final     Chloride   Date Value Ref Range Status   02/19/2025 103 98 - 107 mmol/L Final   05/12/2022 105 98 - 107 mmol/L Final     Carbon Dioxide (CO2)   Date Value Ref Range Status   02/19/2025 24 22 - 29 mmol/L Final   05/12/2022 24 22 - 31 mmol/L Final     Anion Gap   Date Value Ref Range Status   02/19/2025 9 7 - 15 mmol/L Final   05/12/2022 12 " "5 - 18 mmol/L Final     Glucose   Date Value Ref Range Status   02/20/2025 108 (H) 70 - 99 mg/dL Final   05/12/2022 72 70 - 125 mg/dL Final     Urea Nitrogen   Date Value Ref Range Status   02/19/2025 20.1 8.0 - 23.0 mg/dL Final   05/12/2022 33 (H) 8 - 28 mg/dL Final     Creatinine   Date Value Ref Range Status   02/19/2025 0.87 0.51 - 0.95 mg/dL Final     GFR Estimate   Date Value Ref Range Status   02/19/2025 66 >60 mL/min/1.73m2 Final     Comment:     eGFR calculated using 2021 CKD-EPI equation.   03/24/2021 50 (L) >60 mL/min/1.73m2 Final     GFR, ESTIMATED POCT   Date Value Ref Range Status   08/21/2024 56 (L) >60 mL/min/1.73m2 Final     Calcium   Date Value Ref Range Status   02/19/2025 9.5 8.8 - 10.4 mg/dL Final        UA: No results found for: \"UAMP\", \"UBARB\", \"BENZODIAZEUR\", \"UCANN\", \"UCOC\", \"OPIT\", \"UPCP\"           Please see A&P for additional details of medical decision making.  MANAGEMENT DISCUSSED with the following over the past 24 hours: Patient and nurse  NOTE(S)/MEDICAL RECORDS REVIEWED over the past 24 hours: Reviewed notes from orthopedics as well as medicine and nursing  Tests personally interpreted in the past 24 hours:  - wrist xray showing no fx  Tests ORDERED & REVIEWED in the past 24 hours:  - uds  SUPPLEMENTAL HISTORY, in addition to the patient's history, over the past 24 hours obtained from:   - RN  Medical complexity over the past 24 hours:  -------------------------- HIGH RISK FOR MORBIDITY -------------------------------------------------------------  - Parenteral (IV) CONTROLLED SUBSTANCES ordered           Joseline Chan APRN, CNS, CNP  Acute Care Pain Management  Team  Hours of pain coverage Mon-Fri 8-1600  After hours contact the primary team  [facility] (MARJORIE, Roxy, SD, RH)   Page via Vocera text web console -Click for [employer]            "

## 2025-02-20 NOTE — PLAN OF CARE
"Patient vital signs are at baseline: Yes  Patient able to ambulate as they were prior to admission or with assist devices provided by therapies during their stay:  No,  Reason:  Pt declined to attempt to sit at EOB or try to ambulate d/t pain. Pt wants to wait until therapies in AM. Pt turned and repositioned for promotion of skin integrity and comfort, education provided.  Patient MUST void prior to discharge:  Yes  Pt utilizing purewick d/t pain and declined to be OOB with assist to toilet.  Patient able to tolerate oral intake:  Yes  Pain has adequate pain control using Oral analgesics:  No,  Reason:  Gave IV dilaudid for \"10/10\" breakthrough pain x 2 doses, utilizing scheduled pain meds and all nonpharmacologic interventions for pain including turning/repositioning and cold pack application.  Does patient have an identified :  Yes  Has goal D/C date and time been discussed with patient:  Yes      "

## 2025-02-20 NOTE — PROGRESS NOTES
[id number]  Pt A&Ox4. Did have an episode today where she fell asleep in recliner chair and had woken up, set off chair alarm, and was disoriented to place. Easily redirected and no further issues this shift. IV dilaudid was given this AM and pt reported 8/10 pain and was not due for PRN oxycodone yet. Pt did ambulate with PT after this so pain remained 8/10 after reassessment. Pain team was consulted and lidocaine patch was ordered and applied, along with PRN PO dilaudid instead of oxycodone which seems to be effective for pain management, along with ice, rest and repositioning patient. PT signed off this shift. Ambulated in hallway, up to chair for meals, ambulating to bathroom as needed. Poor appetite, denies nausea. Pt hopeful for potential discharge tomorrow. Family at bedside.     Patient vital signs are at baseline: Yes  Patient able to ambulate as they were prior to admission or with assist devices provided by therapies during their stay:  Yes  Patient MUST void prior to discharge:  Yes  Patient able to tolerate oral intake:  Yes  Pain has adequate pain control using Oral analgesics:  No, required x1 dose IV dilaudid this AM.   Does patient have an identified :  Yes  Has goal D/C date and time been discussed with patient:  Yes

## 2025-02-21 VITALS
WEIGHT: 159 LBS | DIASTOLIC BLOOD PRESSURE: 62 MMHG | RESPIRATION RATE: 18 BRPM | HEART RATE: 83 BPM | BODY MASS INDEX: 27.14 KG/M2 | HEIGHT: 64 IN | TEMPERATURE: 98.6 F | SYSTOLIC BLOOD PRESSURE: 131 MMHG | OXYGEN SATURATION: 97 %

## 2025-02-21 LAB
GLUCOSE SERPL-MCNC: 101 MG/DL (ref 70–99)
HGB BLD-MCNC: 10.8 G/DL (ref 11.7–15.7)

## 2025-02-21 PROCEDURE — 250N000013 HC RX MED GY IP 250 OP 250 PS 637: Performed by: FAMILY MEDICINE

## 2025-02-21 PROCEDURE — 36415 COLL VENOUS BLD VENIPUNCTURE: CPT | Performed by: ORTHOPAEDIC SURGERY

## 2025-02-21 PROCEDURE — 82947 ASSAY GLUCOSE BLOOD QUANT: CPT | Performed by: ORTHOPAEDIC SURGERY

## 2025-02-21 PROCEDURE — 85018 HEMOGLOBIN: CPT | Performed by: ORTHOPAEDIC SURGERY

## 2025-02-21 PROCEDURE — 250N000013 HC RX MED GY IP 250 OP 250 PS 637: Performed by: ORTHOPAEDIC SURGERY

## 2025-02-21 PROCEDURE — 250N000013 HC RX MED GY IP 250 OP 250 PS 637: Performed by: PAIN MEDICINE

## 2025-02-21 PROCEDURE — 99232 SBSQ HOSP IP/OBS MODERATE 35: CPT | Performed by: HOSPITALIST

## 2025-02-21 RX ADMIN — LEVOTHYROXINE SODIUM 25 MCG: 0.03 TABLET ORAL at 08:25

## 2025-02-21 RX ADMIN — TIMOLOL MALEATE 1 DROP: 2.5 SOLUTION/ DROPS OPHTHALMIC at 08:43

## 2025-02-21 RX ADMIN — SENNOSIDES AND DOCUSATE SODIUM 1 TABLET: 50; 8.6 TABLET ORAL at 08:26

## 2025-02-21 RX ADMIN — HYDROMORPHONE HYDROCHLORIDE 2 MG: 2 TABLET ORAL at 02:06

## 2025-02-21 RX ADMIN — ASPIRIN 81 MG: 81 TABLET, COATED ORAL at 08:26

## 2025-02-21 RX ADMIN — KETOROLAC TROMETHAMINE 1 DROP: 5 SOLUTION OPHTHALMIC at 08:42

## 2025-02-21 RX ADMIN — POLYETHYLENE GLYCOL 3350 17 G: 17 POWDER, FOR SOLUTION ORAL at 08:26

## 2025-02-21 RX ADMIN — PROPAFENONE HYDROCHLORIDE 150 MG: 150 TABLET, FILM COATED ORAL at 08:25

## 2025-02-21 RX ADMIN — LIDOCAINE 2 PATCH: 4 PATCH TOPICAL at 08:26

## 2025-02-21 RX ADMIN — ACETAMINOPHEN 1000 MG: 500 TABLET ORAL at 08:26

## 2025-02-21 RX ADMIN — PREDNISOLONE ACETATE 1 DROP: 10 SUSPENSION/ DROPS OPHTHALMIC at 08:43

## 2025-02-21 RX ADMIN — HYDROMORPHONE HYDROCHLORIDE 2 MG: 2 TABLET ORAL at 05:31

## 2025-02-21 ASSESSMENT — ACTIVITIES OF DAILY LIVING (ADL)
ADLS_ACUITY_SCORE: 41
ADLS_ACUITY_SCORE: 41
ADLS_ACUITY_SCORE: 39
ADLS_ACUITY_SCORE: 41
ADLS_ACUITY_SCORE: 39
ADLS_ACUITY_SCORE: 41
ADLS_ACUITY_SCORE: 39
ADLS_ACUITY_SCORE: 39
ADLS_ACUITY_SCORE: 41
ADLS_ACUITY_SCORE: 39

## 2025-02-21 NOTE — PLAN OF CARE
"  Problem: Adult Inpatient Plan of Care  Goal: Plan of Care Review  Description: The Plan of Care Review/Shift note should be completed every shift.  The Outcome Evaluation is a brief statement about your assessment that the patient is improving, declining, or no change.  This information will be displayed automatically on your shift  note.  Outcome: Adequate for Care Transition  Goal: Patient-Specific Goal (Individualized)  Description: You can add care plan individualizations to a care plan. Examples of Individualization might be:  \"Parent requests to be called daily at 9am for status\", \"I have a hard time hearing out of my right ear\", or \"Do not touch me to wake me up as it startles  me\".  Outcome: Adequate for Care Transition  Goal: Absence of Hospital-Acquired Illness or Injury  Outcome: Adequate for Care Transition  Goal: Optimal Comfort and Wellbeing  Outcome: Adequate for Care Transition  Goal: Readiness for Transition of Care  Outcome: Adequate for Care Transition     Problem: Delirium  Goal: Optimal Coping  Outcome: Adequate for Care Transition  Goal: Improved Behavioral Control  Outcome: Adequate for Care Transition  Goal: Improved Attention and Thought Clarity  Outcome: Adequate for Care Transition  Goal: Improved Sleep  Outcome: Adequate for Care Transition     Problem: Hip Arthroplasty  Goal: Optimal Coping  Outcome: Adequate for Care Transition  Goal: Absence of Bleeding  Outcome: Adequate for Care Transition  Goal: Effective Bowel Elimination  Outcome: Adequate for Care Transition  Goal: Fluid and Electrolyte Balance  Outcome: Adequate for Care Transition  Goal: Optimal Functional Ability  Outcome: Adequate for Care Transition  Goal: Absence of Infection Signs and Symptoms  Outcome: Adequate for Care Transition  Goal: Intact Neurovascular Status  Outcome: Adequate for Care Transition  Goal: Anesthesia/Sedation Recovery  Outcome: Adequate for Care Transition  Goal: Optimal Pain Control and " Function  Outcome: Adequate for Care Transition  Goal: Nausea and Vomiting Relief  Outcome: Adequate for Care Transition  Goal: Effective Urinary Elimination  Outcome: Adequate for Care Transition  Goal: Effective Oxygenation and Ventilation  Outcome: Adequate for Care Transition   AVS reviewed by this RN. All questions answered. Patient to discharge home with family.

## 2025-02-21 NOTE — DISCHARGE SUMMARY
"ORTHOPEDIC DISCHARGE SUMMARY       Alexandra Menezes,  1940, MRN [id number]    Admission Date: 2025      Admission Diagnoses: Osteoarthritis of left hip [M16.12]  Primary osteoarthritis of left hip [M16.12]     Discharge Date:  2025    Post-operative Day:  2 Days Post-Op    Reason for Admission: The patient was admitted for the following: Procedure(s):  DIRECT ANTERIOR TOTAL HIP ARTHROPLASTY, LEFT    BRIEF HOSPITAL COURSE   Alexandra Menezes is a pleasant 84 year old female who underwent the aforementioned procedure with Dr. Arroyo on 25. There were no intraoperative complications and the patient was transferred to the recovery room and later the orthopedic unit in stable condition. Once the patient reached the orthopedic floor our orthopedic pain protocol was implemented along with the following:    Anticoagulation Medications: ASA  Therapy: PT and OT  Activity: WBAT  Bracing: None    Consultations during Admission: Hospitalist service for medical management     COMPLICATIONS/SIGNIFICANT FINDINGS        DISCHARGE INFORMATION   Condition at discharge: Good  Discharge destination: Home  Patient was seen by myself on the date of discharge.    FOLLOW UP CARE   Follow up with orthopedics in 2 weeks or sooner should the need arise. Ortho will continue to manage pain control, post op anticoagulation and incision care.     Follow up with your PCP for management of chronic medical problems and to evaluate for post op medical complications including constipation, nausea/vomiting, DVT/PE, anemia, changes in blood pressure, fevers/chills, urinary retention and atelectasis/pneumonia.     Subjective   Patient is doing well on POD #1. Pain is well controlled with oral medications. Ambulating. Tolerating oral intake.     Physical Exam   /62 (BP Location: Right arm)   Pulse 83   Temp 98.6  F (37  C) (Oral)   Resp 18   Ht 1.613 m (5' 3.5\")   Wt 72.1 kg (159 lb)   SpO2 97%   BMI 27.72 kg/m    The " patient is A&Ox3. Appears comfortable, sitting in recliner.  Calves are soft and non-tender bilaterally  Brisk capillary refill in the toes.   Palpable left dorsalis pedis pulse. Foot warm & well-perfused.  Sensation is intact to light touch & equal bilaterally in the femoral, DP, SP & tibial nerve distributions.  ROM: Flexes at left hip. Appropriately flexes & extends all toes bilaterally.   Motor: +5/5 dorsiflexion, plantar flexion & EHL bilaterally. Fires quad.   Leg lengths equal.  left hip dressing C/D/I without strikethrough, no surrounding erythema.   5/5 TA/GSC/EHL.        Pertinent Results at Discharge     Hemoglobin   Date/Time Value Ref Range Status   02/21/2025 05:55 AM 10.8 (L) 11.7 - 15.7 g/dL Final   02/20/2025 07:02 AM 11.2 (L) 11.7 - 15.7 g/dL Final   02/05/2025 03:16 PM 12.2 11.7 - 15.7 g/dL Final     INR   Date/Time Value Ref Range Status   09/03/2021 05:08 PM 0.97 0.90 - 1.15 Final     Comment:     Effective 7/11/2021, the reference range for this assay has changed.   03/21/2019 07:11 AM 0.93 0.90 - 1.10 Final     Platelet Count   Date/Time Value Ref Range Status   02/05/2025 03:16  150 - 450 10e3/uL Final   05/17/2023 02:21  150 - 450 10e3/uL Final   07/27/2022 11:03  150 - 450 10e3/uL Final       Problem List   Active Problems:    Primary osteoarthritis of left hip    Hip osteoarthritis      Joy Ortiz PA-C/Dr. Arroyo  [facility]  [phone]  Date: 2/21/2025  Time: 11:20 AM

## 2025-02-21 NOTE — PROGRESS NOTES
"Orthopedic Progress Note      Assessment: 2 Days Post-Op  S/P Procedure(s):  DIRECT ANTERIOR TOTAL HIP ARTHROPLASTY, LEFT     Plan:   - Continue PT/OT  - Weightbearing status: WBAT  - Anticoagulation: ASA 81 mg BID x 1 month in addition to SCDs, barry stockings and early ambulation.  - Pain control: multimodal regimen, minimize IV narcotics.   - Follow up: 2 weeks in clinic with Dr. Arroyo's team for post-op visit   - Discharge planning: to home today     Subjective:  Nausea, Vomiting:  No  Chest pain: No  Lightheadedness, Dizziness:  No  Neuro:  Patient denies new onset numbness or paresthesias     Patient reports that her pain is much improved today compared to yesterday. Rates pain as 4/10. . Ambulating, tolerating oral intake, voiding & passing gas. Hgb 10.8 (11.2 pre-op).     Objective:  /62 (BP Location: Right arm)   Pulse 83   Temp 98.6  F (37  C) (Oral)   Resp 18   Ht 1.613 m (5' 3.5\")   Wt 72.1 kg (159 lb)   SpO2 97%   BMI 27.72 kg/m    The patient is A&Ox3. Appears comfortable, sitting in recliner.  Calves are soft and non-tender bilaterally  Brisk capillary refill in the toes.   Palpable left dorsalis pedis pulse. Foot warm & well-perfused.  Sensation is intact to light touch & equal bilaterally in the femoral, DP, SP & tibial nerve distributions.  ROM: Flexes at left hip. Appropriately flexes & extends all toes bilaterally.   Motor: +5/5 dorsiflexion, plantar flexion & EHL bilaterally. Fires quad.   Leg lengths equal.  left hip dressing C/D/I without strikethrough, no surrounding erythema.   5/5 TA/GSC/EHL.          Pertinent Labs   Lab Results: personally reviewed.   Lab Results   Component Value Date    INR 0.97 09/03/2021    INR 0.93 03/21/2019     Lab Results   Component Value Date    WBC 4.8 02/05/2025    HGB 10.8 (L) 02/21/2025    HCT 38.3 02/05/2025    MCV 95 02/05/2025     02/05/2025     Lab Results   Component Value Date     02/19/2025    CO2 24 02/19/2025         Report " completed by:  Joy Ortiz PA-C/Dr. Arroyo  [facility]    Date: 2/21/2025  Time: 7:56 AM

## 2025-02-21 NOTE — PROGRESS NOTES
[facility] MEDICINE PROGRESS NOTE      Identification/Summary: Alexandra Menezes is a 84 year old female with a past medical history of chronic kidney disease stage III, essential hypertension, paroxysmal atrial fibrillation, nonobstructive coronary artery disease, hyperlipidemia, hypothyroidism and high trauma resulting in displacement of cataract lens replacement who was admitted on 2/19/2025 for elective left total hip arthroplasty. Hospital course is remarkable for poor pain control today.  Consulted the pain team.  Seems to be improved earlier this afternoon     Assessment and Plan:     Hypothyroidism on levothyroxine     Postop low magnesium at 1.6 and on recheck today 1.7 after getting IV magnesium yesterday  Plan: No need for further magnesium checks now that she is off her fluids     Essential hypertension history which she has been controlled with lifestyle modification     Paroxysmal atrial fibrillation  Apparently has stayed in sinus rhythm with propafenone  Plan: Continue that     History of eye trauma with displacement of cataract lens and then repeat surgery but has chronic need for eyedrops x 4  Plan: Continue home eyedrops     Hyperlipidemia on statin     History of coronary artery disease but imaging showed it to be nonobstructive.  No current medical management     Postop day #1, left total hip arthroplasty currently doing well  Plan: Per orthopedic surgical team     DVT prophylaxis: Aspirin twice daily per orthopedic surgical team     Diet: Advance Diet as Tolerated: Regular Diet Adult       Code Status: Full Code        Disposition Plan     Expected Discharge Date: 02/20/2025      Destination: home with family (nephew staying with pt after d/c)             The patient's care was discussed with the Patient and Patient's family    Disposition Plan      Expected Discharge Date: 02/21/2025      Destination: home with family (nephew staying with pt after d/c)  Discharge  Comments: Per orthopedic surgical team          Keaton Frazier MD  [facility]  Phone: #[phone]  Securely message with the Vocera Web Console (learn more here)  Text page via [employer] Paging/Directory     Interval History/Subjective:  Patient reports she is fine other than left hip area pain.  No nausea.  No chest pain or shortness of breath.  No lightheadedness.  Just discouraged by the pain    Physical Exam/Objective:  Temp:  [97.6  F (36.4  C)-98.5  F (36.9  C)] 97.8  F (36.6  C)  Pulse:  [64-68] 65  Resp:  [13-16] 16  BP: (107-149)/(53-70) 107/53  SpO2:  [96 %-100 %] 96 %  Body mass index is 27.72 kg/m .    GENERAL:  Alert, appears comfortable, in no acute distress, appears stated age   HEAD:  Normocephalic, without obvious abnormality, atraumatic   LUNGS:   Clear to auscultation bilaterally, no rales, rhonchi, or wheezing, symmetric chest rise on inhalation, respirations unlabored   HEART:  Regular rate and rhythm, S1 and S2 normal, no murmur, rub, or gallop    ABDOMEN:   Soft, non-tender   EXTREMITIES: No ankle edema   SKIN: Dry to touch, no exanthems in the visualized areas   NEURO: Alert, appears grossly cognitively intact, moves all four extremities freely   PSYCH: Cooperative, behavior is appropriate      Data reviewed today: I personally reviewed all new medications, labs, imaging/diagnostics reports over the past 24 hours. Pertinent findings include:    Labs:  Most Recent 3 CBC's:  Recent Labs   Lab Test 02/20/25  0702 02/05/25  1516 05/20/24  1536 05/17/23  1421 03/14/23  0749 07/27/22  1103   WBC  --  4.8  --  4.1  --  4.7   HGB 11.2* 12.2 13.6 13.0   < > 13.2   MCV  --  95  --  93  --  92   PLT  --  259  --  175  --  203    < > = values in this interval not displayed.     Most Recent 3 BMP's:  Recent Labs   Lab Test 02/20/25  0702 02/19/25  1637 02/19/25  1451 02/05/25  1516   NA  --  136 138 139   POTASSIUM  --  4.6 4.5 4.5   CHLORIDE   --  103 102 104   CO2  --  24 27 24   BUN  --  20.1 18.8 29.0*   CR  --  0.87 0.90 0.99*   ANIONGAP  --  9 9 11   MILADYS  --  9.5 9.1 9.7   * 105* 123* 116*     Most Recent 2 LFT's:  Recent Labs   Lab Test 05/20/24  1536 05/17/23  1421   AST 33 31   ALT 20 24   ALKPHOS 85 78   BILITOTAL 0.7 0.5       Medications:   Personally Reviewed.  Medications   Current Facility-Administered Medications   Medication Dose Route Frequency Provider Last Rate Last Admin    lactated ringers infusion   Intravenous Continuous Donald Arroyo MD   Stopped at 02/20/25 0820     Current Facility-Administered Medications   Medication Dose Route Frequency Provider Last Rate Last Admin    acetaminophen (TYLENOL) tablet 1,000 mg  1,000 mg Oral TID Keaton Frazier MD   1,000 mg at 02/20/25 1441    aspirin EC tablet 81 mg  81 mg Oral BID Donald Arroyo MD   81 mg at 02/20/25 0802    atorvastatin (LIPITOR) tablet 20 mg  20 mg Oral Daily Keaton Frazier MD   20 mg at 02/19/25 2046    ketorolac (ACULAR) 0.5 % ophthalmic solution 1 drop  1 drop Left Eye BID Keaton Frazier MD   1 drop at 02/20/25 0802    latanoprost (XALATAN) 0.005 % ophthalmic solution 1 drop  1 drop Both Eyes At Bedtime Keaton Frazier MD   1 drop at 02/19/25 2044    levothyroxine (SYNTHROID/LEVOTHROID) tablet 25 mcg  25 mcg Oral Daily Keaton Frazier MD   25 mcg at 02/20/25 0802    Lidocaine (LIDOCARE) 4 % Patch 2 patch  2 patch Transdermal Q24H Joseline Chan APRN CNP   2 patch at 02/20/25 1441    polyethylene glycol (MIRALAX) Packet 17 g  17 g Oral Daily Donald Arroyo MD   17 g at 02/20/25 0806    prednisoLONE acetate (PRED FORTE) 1 % ophthalmic susp 1 drop  1 drop Left Eye Daily Keaton Frazier MD   1 drop at 02/20/25 0802    propafenone (RYTHMOL) tablet 150 mg  150 mg Oral BID Keaton Frazier MD   150 mg at 02/20/25 0802    senna-docusate (SENOKOT-S/PERICOLACE) 8.6-50 MG per tablet 1 tablet  1 tablet  Oral BID Donald Arroyo MD   1 tablet at 02/20/25 0802    sodium chloride (PF) 0.9% PF flush 3 mL  3 mL Intracatheter Q8H Donald Arroyo MD   3 mL at 02/20/25 1538    timolol maleate (TIMOPTIC) 0.25 % ophthalmic solution 1 drop  1 drop Left Eye BID Keaton Frazier MD   1 drop at 02/20/25 0803

## 2025-02-21 NOTE — PROGRESS NOTES
[facility] ACUTE INPATIENT PAIN SERVICE    [facility], [facility], [facility], [facility], [place]   PAIN Consult      Assessment/Plan:  Alexandra Menezes is a 84 year old female who was admitted on 2/19/2025.  I was asked by Dr. Frazier, Keaton Mullins MD  to see the patient for poorly controlled postop pain. Admitted for planned hip surgery. History of osteoarthritis of the left hip, CKD 3, HTN, A-fib, CAD, HLD, hypothyroidism.    shows no results. Describes pain as 10/10 and in the last 24 hours utilized 10 mg of oxycodone and 0.4 mg IV Dilaudid in total..      Pain team will sign off. Please reach out with any concerns or questions.    PLAN:  Acute pain secondary to anterior total hip arthroplasty on the left, in the setting of advanced age of 84 and opioid naïve status.   Given severity of pain, inability to perform physical therapy suggest offering opioid rotation from oxycodone to p.o. Dilaudid.  Multimodal Medication Therapy:   Adjuvants: Continue Tylenol increased to every 6 hours, add lidocaine   Opioids: dilaudid 2mg Q3h PRN  IV dilaudid 0.2mg PRN   Non-medication interventions- Ice  Constipation Prophylaxis- senna  Follow up /Discharge Recommendations: Discharge orders in place for oxycodone.    Subjective:    Patient sitting up in chair with son next to her. Stated that switch to dilaudid helped a little bit with pain yesterday evening, but is doing a lot better today and pain is being relieved with only use of tylenol. Son mentioned patient is being discharged on oxycodone and has tolerated in past. Patient is ready to go home.    Reviewed medication administration record.  1 dose of oxycodone 10mg, and 4 doses of 2mg PO dilaudid       Brando Escobar, [place]  Acute Care Pain Management  Team  Hours of pain coverage Mon-Fri 8-1600  After hours contact the primary team  [facility] (MARJORIE, Roxy, [place], RH)   Page via Vocera text web console -Click for [place]

## 2025-02-21 NOTE — PLAN OF CARE
Goal Outcome Evaluation:  Problem: Hip Arthroplasty  Goal: Optimal Pain Control and Function  Outcome: Progressing      Patient vital signs are at baseline: Yes  Patient able to ambulate as they were prior to admission or with assist devices provided by therapies during their stay:  Yes  Patient MUST void prior to discharge:  Yes  Patient able to tolerate oral intake:  Yes  Pain has adequate pain control using Oral analgesics:  Yes  Does patient have an identified :  Yes  Has goal D/C date and time been discussed with patient:  Yes

## 2025-02-21 NOTE — PROGRESS NOTES
[facility]    Medicine Progress Note - Hospitalist Service    Date of Admission:  2/19/2025    Assessment & Plan   Identification/Summary: Alexandra Menezes is a 84 year old female with a past medical history of chronic kidney disease stage III, essential hypertension, paroxysmal atrial fibrillation, nonobstructive coronary artery disease, hyperlipidemia, hypothyroidism and high trauma resulting in displacement of cataract lens replacement who was admitted on 2/19/2025 for elective left total hip arthroplasty. Hospital course is remarkable for poor pain control POD1 with pain team consulted.    Today  A little bit of pain/weakness with flexion left hip, no BM, otherwise doing okay  -Discontinued IV fluids  -HMS portion of home med rec completed  From [facility] standpoint, no opposition to discharge today assuming she is meeting primary team goals    Normocytic anemia-preop hemoglobin 12.2, dropped to 10.8 without signs/symptoms of anemia.  Last known MCV 95.  Op note charted 200 mL EBL.  Signs symptoms and risks of anemia discussed with patient  Constipation-bowel regimen per primary team  Hypomagnesemia-resolved  Essential hypertension-controlled  Paroxysmal atrial fibrillation-on Rythmol, sounds sinus on exam, stable  Coronary artery disease-on atorvastatin 20 mg, postop VTE aspirin, stable  Hypothyroidism-on Synthroid 25 mcg, clinically stable appearing, last TSH May 2024 was 3.3          Diet: Advance Diet as Tolerated: Regular Diet Adult  Discharge Instruction - Regular Diet Adult    DVT Prophylaxis: Defer to primary service  [place] Catheter: Not present  Lines: None     Cardiac Monitoring: None  Code Status: Full Code      Clinically Significant Risk Factors Present on Admission             # Hypomagnesemia: Lowest Mg = 1.6 mg/dL in last 2 days, will replace as needed       # Hypertension: Noted on problem list      # Anemia: based on hgb <11       # Overweight: Estimated body mass index is  "27.72 kg/m  as calculated from the following:    Height as of this encounter: 1.613 m (5' 3.5\").    Weight as of this encounter: 72.1 kg (159 lb).              Social Drivers of Health    Social Connections: Unknown (5/14/2024)    Social Connection and Isolation Panel [NHANES]     Frequency of Social Gatherings with Friends and Family: More than three times a week          Disposition Plan     Medically Ready for Discharge: Anticipated Today         Anil Romero MD  Hospitalist Service  [facility]  Securely message with [employer] (more info)  Text page via [facility] Paging/Directory   ______________________________________________________________________    Interval History   No bowel movement yet, having difficulty raising/flexing left thigh, some struggles with pain control  Afebrile, hemodynamically stable, stable on room air  Denies any history of anemia  Hemoglobin 10.8-no lightheadedness, shortness of breath, chest pain, dizziness  Says that her murmur is known  Primarily utilizing 2 mg p.o. hydromorphone, Tylenol    Physical Exam   Vital Signs: Temp: 98.6  F (37  C) Temp src: Oral BP: 131/62 Pulse: 83   Resp: 18 SpO2: 97 % O2 Device: None (Room air)    Weight: 159 lbs 0 oz    NAD in the chair  Awake, alert, appropriate conversation  Pupils symmetric  Mild hearing difficulties  Systolic murmur, regular rhythm and rate  Breathing comfortably on room air  Is not pulling the most impressive volumes when I assessed her incentive spirometry  No wheeze or rales  Postop left hip  Grossly intact bilateral lower extremity neurovascular assessment  Postop left knee scar    Medical Decision Making       38 MINUTES SPENT BY ME on the date of service doing chart review, history, exam, documentation & further activities per the note.      Data   ------------------------- PAST 24 HR DATA REVIEWED -----------------------------------------------    I have personally reviewed the following data over the past " 24 hrs:    N/A  \   10.8 (L)   / N/A     N/A N/A N/A /  101 (H)   N/A N/A N/A \       Imaging results reviewed over the past 24 hrs:   No results found for this or any previous visit (from the past 24 hours).

## 2025-02-24 ENCOUNTER — PATIENT OUTREACH (OUTPATIENT)
Dept: FAMILY MEDICINE | Facility: CLINIC | Age: 85
End: 2025-02-24
Payer: COMMERCIAL

## 2025-02-24 NOTE — TELEPHONE ENCOUNTER
Writer called patient and unable to leave message to return call to clinic. Patient picked up phone but then did not reply to writer when trying to talk with patient.     If patient returns call please route to nurse queue to complete TCM hospital follow up questionnaire, medication reconciliation and assist with scheduling a hospital follow up visit..    JOSE Castro, RN  [facility]

## 2025-02-25 NOTE — TELEPHONE ENCOUNTER
Writer called patient and left message to return call to clinic.     If patient returns call please route to nurse queue to complete TCM hospital follow up questionnaire, medication reconciliation and assist with scheduling a hospital follow up visit..    JOSE Castro, RN  [facility]

## 2025-02-25 NOTE — TELEPHONE ENCOUNTER
Transitions of Care Outreach  Chief Complaint   Patient presents with    Hospital F/U       Most Recent Admission Date: 2/19/2025   Most Recent Admission Diagnosis: Osteoarthritis of left hip - M16.12  Primary osteoarthritis of left hip - M16.12     Most Recent Discharge Date: 2/21/2025   Most Recent Discharge Diagnosis: Primary osteoarthritis of left hip - M16.12     Transitions of Care Assessment    Discharge Assessment  How are you doing now that you are home?: patient states she is doing fine. Still endorses some numbness in her leg. is completing her PT exercises. Pain is under control.  How are your symptoms? (Red Flag symptoms escalate to triage hotline per guidelines): Improved  Do you know how to contact your clinic care team if you have future questions or changes to your health status? : Yes  Does the patient have their discharge instructions? : Yes  Does the patient have questions regarding their discharge instructions? : No  Were you started on any new medications or were there changes to any of your previous medications? : Yes  Does the patient have all of their medications?: Yes  Do you have questions regarding any of your medications? : No  Do you have all of your needed medical supplies or equipment (DME)?  (i.e. oxygen tank, CPAP, cane, etc.): Yes    Follow up Plan     Discharge Follow-Up  Discharge follow up appointment scheduled in alignment with recommended follow up timeframe or Transitions of Risk Category? (Low = within 30 days; Moderate= within 14 days; High= within 7 days): No (Patient intends to schedule a 2 week follow up with [place]. Declines PCP follow up at this time.)  Patient's follow up appointment not scheduled: Patient declined scheduling support. Education on the importance of transitions of care follow up. Provided scheduling phone number.    No future appointments.    Outpatient Plan as outlined on AVS reviewed with patient.    For any urgent concerns, please contact our 24  hour nurse triage line: [phone] ([phone])       Kyleigh FOREMAN RN

## 2025-03-06 ENCOUNTER — TRANSFERRED RECORDS (OUTPATIENT)
Dept: HEALTH INFORMATION MANAGEMENT | Facility: CLINIC | Age: 85
End: 2025-03-06
Payer: COMMERCIAL

## 2025-03-23 DIAGNOSIS — R94.39 ABNORMAL CARDIOVASCULAR STRESS TEST: ICD-10-CM

## 2025-03-23 DIAGNOSIS — E03.9 HYPOTHYROIDISM: ICD-10-CM

## 2025-03-24 RX ORDER — ATORVASTATIN CALCIUM 20 MG/1
20 TABLET, FILM COATED ORAL DAILY
Qty: 90 TABLET | Refills: 3 | Status: SHIPPED | OUTPATIENT
Start: 2025-03-24

## 2025-03-24 RX ORDER — LEVOTHYROXINE SODIUM 25 UG/1
TABLET ORAL
Qty: 90 TABLET | Refills: 1 | Status: SHIPPED | OUTPATIENT
Start: 2025-03-24

## 2025-04-03 ENCOUNTER — TRANSFERRED RECORDS (OUTPATIENT)
Dept: HEALTH INFORMATION MANAGEMENT | Facility: CLINIC | Age: 85
End: 2025-04-03
Payer: COMMERCIAL

## 2025-04-21 ENCOUNTER — PATIENT OUTREACH (OUTPATIENT)
Dept: CARE COORDINATION | Facility: CLINIC | Age: 85
End: 2025-04-21
Payer: COMMERCIAL

## 2025-04-24 PROBLEM — M85.80 OSTEOPENIA, UNSPECIFIED LOCATION: Status: RESOLVED | Noted: 2023-05-17 | Resolved: 2025-04-24

## 2025-04-27 SDOH — HEALTH STABILITY: PHYSICAL HEALTH: ON AVERAGE, HOW MANY DAYS PER WEEK DO YOU ENGAGE IN MODERATE TO STRENUOUS EXERCISE (LIKE A BRISK WALK)?: 4 DAYS

## 2025-04-27 SDOH — HEALTH STABILITY: PHYSICAL HEALTH: ON AVERAGE, HOW MANY MINUTES DO YOU ENGAGE IN EXERCISE AT THIS LEVEL?: 40 MIN

## 2025-04-27 ASSESSMENT — SOCIAL DETERMINANTS OF HEALTH (SDOH): HOW OFTEN DO YOU GET TOGETHER WITH FRIENDS OR RELATIVES?: THREE TIMES A WEEK

## 2025-04-28 ENCOUNTER — OFFICE VISIT (OUTPATIENT)
Dept: FAMILY MEDICINE | Facility: CLINIC | Age: 85
End: 2025-04-28
Payer: COMMERCIAL

## 2025-04-28 VITALS
OXYGEN SATURATION: 99 % | BODY MASS INDEX: 27.68 KG/M2 | HEART RATE: 56 BPM | RESPIRATION RATE: 18 BRPM | TEMPERATURE: 98 F | HEIGHT: 63 IN | WEIGHT: 156.2 LBS | SYSTOLIC BLOOD PRESSURE: 120 MMHG | DIASTOLIC BLOOD PRESSURE: 64 MMHG

## 2025-04-28 DIAGNOSIS — E78.00 PURE HYPERCHOLESTEROLEMIA: ICD-10-CM

## 2025-04-28 DIAGNOSIS — R94.39 ABNORMAL CARDIOVASCULAR STRESS TEST: ICD-10-CM

## 2025-04-28 DIAGNOSIS — E55.9 VITAMIN D DEFICIENCY: ICD-10-CM

## 2025-04-28 DIAGNOSIS — I48.91 ATRIAL FIBRILLATION, UNSPECIFIED TYPE (H): ICD-10-CM

## 2025-04-28 DIAGNOSIS — Z85.3 HISTORY OF BREAST CANCER: ICD-10-CM

## 2025-04-28 DIAGNOSIS — I10 ESSENTIAL HYPERTENSION: ICD-10-CM

## 2025-04-28 DIAGNOSIS — R79.89 ELEVATED PARATHYROID HORMONE: ICD-10-CM

## 2025-04-28 DIAGNOSIS — I25.10 CORONARY ARTERY DISEASE INVOLVING NATIVE CORONARY ARTERY OF NATIVE HEART WITHOUT ANGINA PECTORIS: ICD-10-CM

## 2025-04-28 DIAGNOSIS — N18.31 STAGE 3A CHRONIC KIDNEY DISEASE (H): Chronic | ICD-10-CM

## 2025-04-28 DIAGNOSIS — Z00.00 MEDICARE ANNUAL WELLNESS VISIT, SUBSEQUENT: Primary | ICD-10-CM

## 2025-04-28 DIAGNOSIS — E03.9 HYPOTHYROIDISM, UNSPECIFIED TYPE: ICD-10-CM

## 2025-04-28 LAB
ERYTHROCYTE [DISTWIDTH] IN BLOOD BY AUTOMATED COUNT: 12.7 % (ref 10–15)
HCT VFR BLD AUTO: 41.2 % (ref 35–47)
HGB BLD-MCNC: 12.8 G/DL (ref 11.7–15.7)
MCH RBC QN AUTO: 29.2 PG (ref 26.5–33)
MCHC RBC AUTO-ENTMCNC: 31.1 G/DL (ref 31.5–36.5)
MCV RBC AUTO: 94 FL (ref 78–100)
PLATELET # BLD AUTO: 210 10E3/UL (ref 150–450)
RBC # BLD AUTO: 4.39 10E6/UL (ref 3.8–5.2)
WBC # BLD AUTO: 3.9 10E3/UL (ref 4–11)

## 2025-04-28 PROCEDURE — 1126F AMNT PAIN NOTED NONE PRSNT: CPT | Performed by: FAMILY MEDICINE

## 2025-04-28 PROCEDURE — 82043 UR ALBUMIN QUANTITATIVE: CPT | Performed by: FAMILY MEDICINE

## 2025-04-28 PROCEDURE — 85027 COMPLETE CBC AUTOMATED: CPT | Performed by: FAMILY MEDICINE

## 2025-04-28 PROCEDURE — 99214 OFFICE O/P EST MOD 30 MIN: CPT | Mod: 25 | Performed by: FAMILY MEDICINE

## 2025-04-28 PROCEDURE — 36415 COLL VENOUS BLD VENIPUNCTURE: CPT | Performed by: FAMILY MEDICINE

## 2025-04-28 PROCEDURE — 82306 VITAMIN D 25 HYDROXY: CPT | Performed by: FAMILY MEDICINE

## 2025-04-28 PROCEDURE — 82570 ASSAY OF URINE CREATININE: CPT | Performed by: FAMILY MEDICINE

## 2025-04-28 PROCEDURE — G2211 COMPLEX E/M VISIT ADD ON: HCPCS | Performed by: FAMILY MEDICINE

## 2025-04-28 PROCEDURE — 91320 SARSCV2 VAC 30MCG TRS-SUC IM: CPT | Performed by: FAMILY MEDICINE

## 2025-04-28 PROCEDURE — 80061 LIPID PANEL: CPT | Performed by: FAMILY MEDICINE

## 2025-04-28 PROCEDURE — 83970 ASSAY OF PARATHORMONE: CPT | Performed by: FAMILY MEDICINE

## 2025-04-28 PROCEDURE — 3074F SYST BP LT 130 MM HG: CPT | Performed by: FAMILY MEDICINE

## 2025-04-28 PROCEDURE — 90480 ADMN SARSCOV2 VAC 1/ONLY CMP: CPT | Performed by: FAMILY MEDICINE

## 2025-04-28 PROCEDURE — 3078F DIAST BP <80 MM HG: CPT | Performed by: FAMILY MEDICINE

## 2025-04-28 PROCEDURE — G0439 PPPS, SUBSEQ VISIT: HCPCS | Performed by: FAMILY MEDICINE

## 2025-04-28 PROCEDURE — 80053 COMPREHEN METABOLIC PANEL: CPT | Performed by: FAMILY MEDICINE

## 2025-04-28 PROCEDURE — 84443 ASSAY THYROID STIM HORMONE: CPT | Performed by: FAMILY MEDICINE

## 2025-04-28 RX ORDER — ATORVASTATIN CALCIUM 20 MG/1
20 TABLET, FILM COATED ORAL DAILY
Qty: 90 TABLET | Refills: 4 | Status: SHIPPED | OUTPATIENT
Start: 2025-04-28

## 2025-04-28 RX ORDER — BRIMONIDINE TARTRATE 2 MG/ML
1 SOLUTION/ DROPS OPHTHALMIC 2 TIMES DAILY
COMMUNITY
Start: 2025-03-25

## 2025-04-28 RX ORDER — LEVOTHYROXINE SODIUM 25 UG/1
25 TABLET ORAL DAILY
Qty: 90 TABLET | Refills: 4 | Status: SHIPPED | OUTPATIENT
Start: 2025-04-28

## 2025-04-28 ASSESSMENT — PAIN SCALES - GENERAL: PAINLEVEL_OUTOF10: NO PAIN (0)

## 2025-04-28 NOTE — PROGRESS NOTES
Preventive Care Visit  [facility]  Donna Buchanan MD, Family Medicine  Apr 28, 2025      Assessment & Plan     Medicare annual wellness visit, subsequent  At today's visit, we discussed lifestyle interventions to promote self-management and wellness, including maintenance of a healthy weight, healthy diet, regular physical activity and exercise, and falls prevention.  COVID-vaccine administered today.  Encourage consideration of RSV vaccine, this will need to be administered at a pharmacy.  Will obtain fasting glucose, fasting lipids today.  Up-to-date with bone density screening.    Essential hypertension  Hmong controlled without need for medication.    Coronary artery disease involving native coronary artery of native heart without angina pectoris; mild and nonobstructive  She continues to work with cardiology Dr. Escalona.  Continue risk factor modification.  - Lipid panel reflex to direct LDL Fasting; Future    Atrial fibrillation, unspecified type (H)  Rate controlled with propafenone, does not require anticoagulation per cardiology.    Hypercholesterolemia  Encouraged healthy lifestyle habits.  Continue atorvastatin.  We will check fasting lipids today.  - atorvastatin (LIPITOR) 20 MG tablet; Take 1 tablet (20 mg) by mouth daily.  - Lipid panel reflex to direct LDL Fasting; Future    Stage 3a chronic kidney disease (H)  Encouraged continued avoidance of nephrotoxic agents, continue adequate hydration.  She remains off lisinopril as blood pressures were on the low end.  We will check urine microalbumin and will check for complications with CBC, parathyroid hormone level, vitamin D level.  - Albumin Random Urine Quantitative with Creat Ratio; Future  - CBC with platelets; Future  - Comprehensive metabolic panel; Future  - Parathyroid Hormone Intact; Future  - Vitamin D Deficiency; Future    History of breast cancer  No evidence of recurrence.    Elevated parathyroid hormone  We will  "reassess PTH today    Hypothyroidism, unspecified type  She just resumed levothyroxine about a week ago, will keep this in mind.  Will check TSH today.  - levothyroxine (SYNTHROID/LEVOTHROID) 25 MCG tablet; Take 1 tablet (25 mcg) by mouth daily.  - TSH with free T4 reflex; Future    Vitamin D deficiency  Will reassess vitamin D level today.  - Vitamin D Deficiency; Future    Abnormal cardiovascular stress test  Continue risk factor modification as recommended by cardiology.  - atorvastatin (LIPITOR) 20 MG tablet; Take 1 tablet (20 mg) by mouth daily.  - Lipid panel reflex to direct LDL Fasting; Future    Will keep an eye on the very mild swelling of the left greater than right foot, keep an eye on the left leg paresthesias and discussed with orthopedics.      The longitudinal plan of care for the diagnosis(es)/condition(s) as documented were addressed during this visit. Due to the added complexity in care, I will continue to support Alexandra in the subsequent management and with ongoing continuity of care.      BMI  Estimated body mass index is 27.45 kg/m  as calculated from the following:    Height as of this encounter: 1.607 m (5' 3.25\").    Weight as of this encounter: 70.9 kg (156 lb 3.2 oz).       Counseling  Appropriate preventive services were addressed with this patient via screening, questionnaire, or discussion as appropriate for fall prevention, nutrition, physical activity, Tobacco-use cessation, social engagement, weight loss and cognition.  Checklist reviewing preventive services available has been given to the patient.  Reviewed patient's diet, addressing concerns and/or questions.   Patient reported safety concerns were addressed today.The patient was provided with written information regarding signs of hearing loss.           Subjective   Alexandra is a 84 year old, presenting for the following:  Wellness Visit (Fasting/) and Swelling (Swelling left leg, tingling right leg no swelling. Hip surgery 2 " months ago, related?)            HPI  History of Present Illness-  Numbness and Swelling  - Numbness from the outer hip down to the ankle, mostly on the top of the foot.  - Swelling in both feet, more pronounced in the left foot, persisting since surgery over two months ago.  - Right foot also experiences some swelling.  - Numbness and swelling have been worse than expected post-surgery.  - Concerns about potential neuropathy due to numbness.    Medication Changes  - Stopped taking levothyroxine and switched to lisinopril, then returned to levothyroxine about a week ago.  - Noticed some improvement in symptoms since returning to levothyroxine.    Surgery Recovery  - Back to exercising, including swimming and SilverSneakers, but experienced overexertion initially.  - No new or different symptoms in the back, but lower back pain occurs if standing too long, depending on footwear.  - No breathing issues, no new cough, no shortness of breath, and no heart palpitations reported.         Advance Care Planning    Document on file is a Health Care Directive or POLST.        4/27/2025   General Health   How would you rate your overall physical health? Good   Feel stress (tense, anxious, or unable to sleep) Only a little   (!) STRESS CONCERN      4/27/2025   Nutrition   Diet: Regular (no restrictions)         4/27/2025   Exercise   Days per week of moderate/strenous exercise 4 days   Average minutes spent exercising at this level 40 min         4/27/2025   Social Factors   Frequency of gathering with friends or relatives Three times a week   Worry food won't last until get money to buy more No   Food not last or not have enough money for food? No   Do you have housing? (Housing is defined as stable permanent housing and does not include staying outside in a car, in a tent, in an abandoned building, in an overnight shelter, or couch-surfing.) No   Are you worried about losing your housing? No   Lack of transportation? No    Unable to get utilities (heat,electricity)? No   Want help with housing or utility concern? No   (!) HOUSING CONCERN PRESENT      4/27/2025   Fall Risk   Fallen 2 or more times in the past year? No   Trouble with walking or balance? No          4/27/2025   Activities of Daily Living- Home Safety   Needs help with the following daily activites None of the above   Safety concerns in the home No grab bars in the bathroom         4/27/2025   Dental   Dentist two times every year? Yes         4/27/2025   Hearing Screening   Hearing concerns? (!) TROUBLE UNDERSTANDING SOFT OR WHISPERED SPEECH.         4/27/2025   Driving Risk Screening   Patient/family members have concerns about driving No         4/27/2025   General Alertness/Fatigue Screening   Have you been more tired than usual lately? No         4/27/2025   Urinary Incontinence Screening   Bothered by leaking urine in past 6 months No           Today's PHQ-2 Score:       2/5/2025     1:47 PM   PHQ-2 ( 1999 [employer])   Q1: Little interest or pleasure in doing things 0   Q2: Feeling down, depressed or hopeless 0   PHQ-2 Score 0    Q1: Little interest or pleasure in doing things Not at all   Q2: Feeling down, depressed or hopeless Not at all   PHQ-2 Score 0       Patient-reported         4/27/2025   Substance Use   Alcohol more than 3/day or more than 7/wk No   Do you have a current opioid prescription? No   How severe/bad is pain from 1 to 10? 3/10   Do you use any other substances recreationally? No     Social History     Tobacco Use    Smoking status: Never    Smokeless tobacco: Never   Vaping Use    Vaping status: Never Used   Substance Use Topics    Alcohol use: Yes     Alcohol/week: 2.0 standard drinks of alcohol     Types: 2 Standard drinks or equivalent per week     Comment: 1-2 per week    Drug use: No           11/18/2024   LAST FHS-7 RESULTS   1st degree relative breast or ovarian cancer No   Any relative bilateral breast cancer No   Any male have breast  cancer No   Any ONE woman have BOTH breast AND ovarian cancer No   Any woman with breast cancer before 50yrs No   2 or more relatives with breast AND/OR ovarian cancer No   2 or more relatives with breast AND/OR bowel cancer No        Mammogram Screening - After age 74- determine frequency with patient based on health status, life expectancy and patient goals              Reviewed and updated as needed this visit by Provider                    Past Medical History:   Diagnosis Date    Arrhythmia     take propafenone    Arthritis     Breast cancer (H) 1992    right Mastectomy    CKD (chronic kidney disease) stage 3, GFR 30-59 ml/min (H) 2019    Coronary artery disease     Disease of thyroid gland     hypothyroid    Hypercholesterolemia     Hypertension     Multiple premature ventricular complexes 2019    Sinus bradycardia     frequent PVC's    Squamous cell cancer of skin of hand 2015     Past Surgical History:   Procedure Laterality Date    ARTHROPLASTY HIP ANTERIOR Left 2025    Procedure: DIRECT ANTERIOR TOTAL HIP ARTHROPLASTY, LEFT;  Surgeon: Donald Arroyo MD;  Location: [facility]    BREAST SURGERY Right 1992    Mastectomy for Ca    MASTECTOMY Right 1992    PHACOEMULSIFICATION CLEAR CORNEA WITH STANDARD INTRAOCULAR LENS IMPLANT Bilateral     REPLACEMENT TOTAL KNEE Left 2019    Procedure: LEFT TOTAL KNEE ARTHROPLASTY,COMPUTER-ASSIST;  Surgeon: eFde Robert MD;  Location: [facility];  Service: Orthopedics    vitrectomy Left 2023    dislocation of lens; Dr. 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    Localized Osteoarthritis Of The Knee    History of breast cancer    Frequent unifocal premature ventricular contractions    Essential hypertension    Vitamin D deficiency    CKD (chronic kidney disease) stage 3, GFR 30-59 ml/min (H)    Coronary artery disease involving native coronary artery of native heart without angina pectoris; mild and nonobstructive    Elevated parathyroid hormone    Atrial fibrillation (H)    Primary osteoarthritis of left hip    Hip osteoarthritis     Past Surgical History:   Procedure Laterality Date    ARTHROPLASTY HIP ANTERIOR Left 2/19/2025    Procedure: DIRECT ANTERIOR TOTAL HIP ARTHROPLASTY, LEFT;  Surgeon: Donald Arroyo MD;  Location: [facility]    BREAST SURGERY Right 01/01/1992    Mastectomy for Ca    MASTECTOMY Right 01/01/1992    PHACOEMULSIFICATION CLEAR CORNEA WITH STANDARD INTRAOCULAR LENS IMPLANT Bilateral     REPLACEMENT TOTAL KNEE Left 03/21/2019    Procedure: LEFT TOTAL KNEE ARTHROPLASTY,COMPUTER-ASSIST;  Surgeon: Fede Robert MD;  Location: [facility];  Service: Orthopedics    vitrectomy Left 03/16/2023    dislocation of lens; Dr. Borja       Social History     Tobacco Use    Smoking status: Never    Smokeless tobacco: Never   Substance Use Topics    Alcohol use: Yes     Alcohol/week: 2.0 standard drinks of alcohol     Types: 2 Standard drinks or equivalent per week     Comment: 1-2 per week     Family History   Problem Relation Age of Onset    Rheumatoid Arthritis Mother     Valvular heart disease Father     Hyperthyroidism Sister     Heart Disease Sister     Heart Disease Brother     CABG Brother     Heart Disease Maternal Grandmother     Clotting Disorder No family  hx of     Anesthesia Reaction No family hx of          Current Outpatient Medications   Medication Sig Dispense Refill    atorvastatin (LIPITOR) 20 MG tablet Take 1 tablet (20 mg) by mouth daily. 90 tablet 4    brimonidine (ALPHAGAN) 0.2 % ophthalmic solution Place 1 drop Into the left eye 2 times daily.      ketorolac (ACULAR) 0.5 % ophthalmic solution Place 1 drop Into the left eye 2 times daily      latanoprost (XALATAN) 0.005 % ophthalmic solution Place 1 drop into both eyes at bedtime.      levothyroxine (SYNTHROID/LEVOTHROID) 25 MCG tablet Take 1 tablet (25 mcg) by mouth daily. 90 tablet 4    prednisoLONE acetate (PRED FORTE) 1 % ophthalmic suspension Place 1 drop Into the left eye daily      propafenone (RYTHMOL) 150 MG TABS tablet Take 1 tablet (150 mg) by mouth 2 times daily. 180 tablet 2    timolol maleate (TIMOPTIC) 0.25 % ophthalmic solution Place 1 drop Into the left eye 2 times daily      oxyCODONE (ROXICODONE) 5 MG tablet Take 1-2 tablets (5-10 mg) by mouth every 4 hours as needed for moderate to severe pain. 25 tablet 0     No Known Allergies  Recent Labs   Lab Test 02/19/25  1637 02/19/25  1451 08/21/24  0835 05/20/24  1536 05/17/23  1421 07/27/22  1103 05/12/22  0935 09/03/21  1708 03/24/21  1101 06/30/20  1140   A1C  --   --   --  4.9  --   --   --   --   --   --    LDL  --   --   --  79 86  --  113  --  205* 211*   HDL  --   --   --  98 98  --  90  --  87 83   TRIG  --   --   --  60 72  --  59  --  104 77   ALT  --   --   --  20 24  --  22  --  19 21   CR 0.87 0.90   < > 0.88 1.02*   < > 1.26*   < > 1.07 1.20*   GFRESTIMATED 66 64   < > 66 55*   < > 43*   < > 50* 44*   GFRESTBLACK  --   --   --   --   --   --   --   --  60* 53*   POTASSIUM 4.6 4.5   < > 4.1 4.4   < > 4.6   < > 4.6 5.0   TSH  --   --   --  3.29 2.70   < > 2.25  --  3.15 2.36    < > = values in this interval not displayed.      Current providers sharing in care for this patient include:  Patient Care Team:  Donna Buchanan MD as  "PCP - General  Donna Buchanan MD as Assigned PCP  Crow Machuca MD as MD (Cardiovascular Disease)  Adan Borja MD as MD (Ophthalmology)  Branden Escalona MD as MD (Cardiovascular Disease)  Branden Escalona MD as Assigned Heart and Vascular Provider  Aakash Adkins PA-C as Assigned Musculoskeletal Provider    The following health maintenance items are reviewed in Epic and correct as of today:  Health Maintenance   Topic Date Due    RSV VACCINE (1 - 1-dose 75+ series) Never done    COVID-19 Vaccine (8 - 2024-25 season) 09/01/2024    MEDICARE ANNUAL WELLNESS VISIT  05/20/2025    CMP  05/20/2025    LIPID  05/20/2025    MICROALBUMIN  05/20/2025    TSH W/FREE T4 REFLEX  05/20/2025    ANNUAL REVIEW OF HM ORDERS  02/05/2026    BMP  02/19/2026    HEMOGLOBIN  02/21/2026    FALL RISK ASSESSMENT  04/28/2026    DEXA  11/18/2029    ADVANCE CARE PLANNING  03/07/2030    DTAP/TDAP/TD IMMUNIZATION (3 - Td or Tdap) 06/01/2033    PHQ-2 (once per calendar year)  Completed    INFLUENZA VACCINE  Completed    Pneumococcal Vaccine: 50+ Years  Completed    URINALYSIS  Completed    ZOSTER IMMUNIZATION  Completed    HPV IMMUNIZATION  Aged Out    MENINGITIS IMMUNIZATION  Aged Out    MAMMO SCREENING  Discontinued         Review of Systems  Constitutional, neuro, ENT, endocrine, pulmonary, cardiac, gastrointestinal, genitourinary, musculoskeletal, integument and psychiatric systems are negative, except as otherwise noted.     Objective    Exam  /64   Pulse 56   Temp 98  F (36.7  C) (Oral)   Resp 18   Ht 1.607 m (5' 3.25\")   Wt 70.9 kg (156 lb 3.2 oz)   SpO2 99%   BMI 27.45 kg/m     Estimated body mass index is 27.45 kg/m  as calculated from the following:    Height as of this encounter: 1.607 m (5' 3.25\").    Weight as of this encounter: 70.9 kg (156 lb 3.2 oz).    Physical Exam  GENERAL: alert and no distress  EYES: Eyes grossly normal to inspection, PERRL and conjunctivae and sclerae normal  HENT: ear " canals and TM's normal, nose and mouth without ulcers or lesions  NECK: no adenopathy, no asymmetry, masses, or scars  RESP: lungs clear to auscultation - no rales, rhonchi or wheezes  CV: regular rate and rhythm, normal S1 S2, no S3 or S4, no murmur, click or rub, no peripheral edema  ABDOMEN: soft, nontender, no hepatosplenomegaly, no masses and bowel sounds normal  MS: no gross musculoskeletal defects noted, no edema  SKIN: no suspicious lesions or rashes  NEURO: Normal strength and tone, mentation intact and speech normal  PSYCH: mentation appears normal, affect normal/bright        4/28/2025   Mini Cog   Clock Draw Score 2 Normal   3 Item Recall 3 objects recalled   Mini Cog Total Score 5             5/20/2024   Vision Screen   Reason Vision Screen Not Completed Patient had exam in last 12 months       Signed Electronically by: Donna Buchanan MD

## 2025-04-28 NOTE — PROGRESS NOTES
Prior to immunization administration, verified patients identity using patient s name and date of birth. Please see Immunization Activity for additional information.     Screening Questionnaire for Adult Immunization    Are you sick today?   No   Do you have allergies to medications, food, a vaccine component or latex?   No   Have you ever had a serious reaction after receiving a vaccination?   No   Do you have a long-term health problem with heart, lung, kidney, or metabolic disease (e.g., diabetes), asthma, a blood disorder, no spleen, complement component deficiency, a cochlear implant, or a spinal fluid leak?  Are you on long-term aspirin therapy?   No   Do you have cancer, leukemia, HIV/AIDS, or any other immune system problem?   No   Do you have a parent, brother, or sister with an immune system problem?   No   In the past 3 months, have you taken medications that affect  your immune system, such as prednisone, other steroids, or anticancer drugs; drugs for the treatment of rheumatoid arthritis, Crohn s disease, or psoriasis; or have you had radiation treatments?   No   Have you had a seizure, or a brain or other nervous system problem?   No   During the past year, have you received a transfusion of blood or blood    products, or been given immune (gamma) globulin or antiviral drug?   No   For women: Are you pregnant or is there a chance you could become       pregnant during the next month?   No   Have you received any vaccinations in the past 4 weeks?   No     Immunization questionnaire answers were all negative.      Patient instructed to remain in clinic for 15 minutes afterwards, and to report any adverse reactions.     Screening performed by Yoselin Iniguez CMA on 4/28/2025 at 12:49 PM.

## 2025-04-29 LAB
ALBUMIN SERPL BCG-MCNC: 4.4 G/DL (ref 3.5–5.2)
ALP SERPL-CCNC: 121 U/L (ref 40–150)
ALT SERPL W P-5'-P-CCNC: 21 U/L (ref 0–50)
ANION GAP SERPL CALCULATED.3IONS-SCNC: 10 MMOL/L (ref 7–15)
AST SERPL W P-5'-P-CCNC: 28 U/L (ref 0–45)
BILIRUB SERPL-MCNC: 0.5 MG/DL
BUN SERPL-MCNC: 21.6 MG/DL (ref 8–23)
CALCIUM SERPL-MCNC: 10.1 MG/DL (ref 8.8–10.4)
CHLORIDE SERPL-SCNC: 106 MMOL/L (ref 98–107)
CHOLEST SERPL-MCNC: 194 MG/DL
CREAT SERPL-MCNC: 1 MG/DL (ref 0.51–0.95)
CREAT UR-MCNC: 128 MG/DL
EGFRCR SERPLBLD CKD-EPI 2021: 56 ML/MIN/1.73M2
FASTING STATUS PATIENT QL REPORTED: YES
FASTING STATUS PATIENT QL REPORTED: YES
GLUCOSE SERPL-MCNC: 98 MG/DL (ref 70–99)
HCO3 SERPL-SCNC: 27 MMOL/L (ref 22–29)
HDLC SERPL-MCNC: 91 MG/DL
LDLC SERPL CALC-MCNC: 88 MG/DL
MICROALBUMIN UR-MCNC: <12 MG/L
MICROALBUMIN/CREAT UR: NORMAL MG/G{CREAT}
NONHDLC SERPL-MCNC: 103 MG/DL
POTASSIUM SERPL-SCNC: 5.1 MMOL/L (ref 3.4–5.3)
PROT SERPL-MCNC: 6.9 G/DL (ref 6.4–8.3)
PTH-INTACT SERPL-MCNC: 79 PG/ML (ref 15–65)
SODIUM SERPL-SCNC: 143 MMOL/L (ref 135–145)
TRIGL SERPL-MCNC: 73 MG/DL
TSH SERPL DL<=0.005 MIU/L-ACNC: 2.92 UIU/ML (ref 0.3–4.2)
VIT D+METAB SERPL-MCNC: 40 NG/ML (ref 20–50)

## 2025-05-05 ENCOUNTER — PATIENT OUTREACH (OUTPATIENT)
Dept: CARE COORDINATION | Facility: CLINIC | Age: 85
End: 2025-05-05
Payer: COMMERCIAL

## 2025-05-15 ENCOUNTER — NURSE TRIAGE (OUTPATIENT)
Dept: FAMILY MEDICINE | Facility: CLINIC | Age: 85
End: 2025-05-15
Payer: COMMERCIAL

## 2025-05-15 NOTE — TELEPHONE ENCOUNTER
Nurse Triage SBAR    Is this a 2nd Level Triage? NO    Situation: patient has had numbness in both of her feet on and off for 1 month. She states if she does not get up and walk it gets bad. She states it is mostly in her left leg. She has had some since her surgery.    Background: patient had hip surgery 3 months ago.    Assessment: her feet are swollen but the swelling goes down over night. She has tingling that is below her knee on both sides. She states the left is worse than the right. She does not have any weakness in her legs.    Protocol Recommended Disposition:   See in Office Within 3 Days    Recommendation: per protocol patient advised to call her surgeon since her symptoms have been on and off since her surgery.   Please advise if she should also see her pcp.    Nilda Angel RN on 5/15/2025 at 9:43 AM    Reason for Disposition   Weakness of arm or leg is a chronic symptom (recurrent or ongoing problem lasting > 4 weeks)    Additional Information   Negative: SEVERE weakness (e.g., unable to walk or barely able to walk, requires support) and new-onset or getting worse   Negative: Difficult to awaken or acting confused (e.g., disoriented, slurred speech)   Negative: Sudden onset of weakness of the face, arm or leg on one side of the body and present now (Exception: Bell's palsy suspected: weakness on one side of the face developing over hours to days, with no other symptoms.)   Negative: Sudden onset of numbness of the face, arm or leg on one side of the body and present now   Negative: Sudden onset of loss of speech or garbled speech and present now   Negative: Sounds like a life-threatening emergency to the triager   Negative: Confusion, disorientation, or hallucinations is main symptom   Negative: Dizziness is main symptom   Negative: Followed a head injury within last 3 days   Negative: Headache (with neurologic deficit)   Negative: Unable to urinate (or only a few drops) and bladder feels very  full   Negative: Loss of bladder or bowel control (urine or bowel incontinence; wetting self, leaking stool) of new-onset   Negative: Back pain with numbness (loss of sensation) in groin or rectal area   Negative: Neurologic deficit that was brief (now gone), ANY of the following: * Weakness of the face, arm, or leg on one side of the body * Numbness of the face, arm, or leg on one side of the body * Loss of speech or garbled speech   Negative: Patient sounds very sick or weak to the triager   Negative: Neurologic deficit of gradual onset (e.g., days to weeks), ANY of the following: * Weakness of the face, arm, or leg on one side of the body* Numbness of the face, arm, or leg on one side of the body* Loss of speech or garbled speech   Negative: [place] palsy suspected (i.e., weakness only one side of the face, developing over hours to days, no other symptoms)   Negative: Tingling (e.g., pins and needles) of the face, arm or leg on one side of the body, that is present now  (Exceptions: Chronic or recurrent symptom lasting > 4 weeks; or from known cause, such as: bumped elbow, carpal tunnel, pinched nerve.)   Negative: Neck pain (with neurologic deficit)   Negative: Back pain (with neurologic deficit)   Negative: Patient wants to be seen   Negative: Loss of speech or garbled speech is a chronic symptom (recurrent or ongoing problem lasting > 4 weeks)    Protocols used: Neurologic Deficit-A-[place]

## 2025-05-15 NOTE — TELEPHONE ENCOUNTER
RN called Alexandra on 5/15 and left a message to return call to clinic.      TC if patient returns call please relay provider notes and recommendations. Please route to nurse queue if there are any further questions or concerns. .    Anil Warner RN  [facility]

## 2025-05-15 NOTE — TELEPHONE ENCOUNTER
RN called Alexandra on 5/15 and left a message to return call to clinic.      TC if patient returns call please relay provider notes and recommendations. Please route to nurse queue if there are any further questions or concerns. .    Anil Warner RN  [facility]

## 2025-05-19 ENCOUNTER — OFFICE VISIT (OUTPATIENT)
Dept: FAMILY MEDICINE | Facility: CLINIC | Age: 85
End: 2025-05-19
Payer: COMMERCIAL

## 2025-05-19 ENCOUNTER — ANCILLARY PROCEDURE (OUTPATIENT)
Dept: GENERAL RADIOLOGY | Facility: CLINIC | Age: 85
End: 2025-05-19
Attending: FAMILY MEDICINE
Payer: COMMERCIAL

## 2025-05-19 VITALS
DIASTOLIC BLOOD PRESSURE: 60 MMHG | HEART RATE: 51 BPM | WEIGHT: 159 LBS | SYSTOLIC BLOOD PRESSURE: 118 MMHG | TEMPERATURE: 98.1 F | BODY MASS INDEX: 28.17 KG/M2 | HEIGHT: 63 IN | OXYGEN SATURATION: 99 % | RESPIRATION RATE: 18 BRPM

## 2025-05-19 DIAGNOSIS — R20.2 PARESTHESIAS: ICD-10-CM

## 2025-05-19 DIAGNOSIS — R20.2 PARESTHESIAS: Primary | ICD-10-CM

## 2025-05-19 LAB — VIT B12 SERPL-MCNC: 481 PG/ML (ref 232–1245)

## 2025-05-19 PROCEDURE — 3078F DIAST BP <80 MM HG: CPT | Performed by: FAMILY MEDICINE

## 2025-05-19 PROCEDURE — 36415 COLL VENOUS BLD VENIPUNCTURE: CPT | Performed by: FAMILY MEDICINE

## 2025-05-19 PROCEDURE — 1126F AMNT PAIN NOTED NONE PRSNT: CPT | Performed by: FAMILY MEDICINE

## 2025-05-19 PROCEDURE — 72100 X-RAY EXAM L-S SPINE 2/3 VWS: CPT | Mod: TC | Performed by: RADIOLOGY

## 2025-05-19 PROCEDURE — 99213 OFFICE O/P EST LOW 20 MIN: CPT | Performed by: FAMILY MEDICINE

## 2025-05-19 PROCEDURE — 3074F SYST BP LT 130 MM HG: CPT | Performed by: FAMILY MEDICINE

## 2025-05-19 PROCEDURE — 82607 VITAMIN B-12: CPT | Performed by: FAMILY MEDICINE

## 2025-05-19 ASSESSMENT — PAIN SCALES - GENERAL: PAINLEVEL_OUTOF10: NO PAIN (0)

## 2025-05-19 NOTE — PROGRESS NOTES
[facility]  [address] [facility]  [facility] [address]  Phone: [phone]  Fax: [phone]    Patient:  Alexandra Menezes, Date of birth 1940  Date of Visit:  05/19/2025  Referring Provider No ref. provider found  Reason for visit: Numbness of both legs       Assessment & Plan     Paresthesias:  - Tingling in legs most likely due to narrowing of the spinal canal, possibly spinal stenosis, less likely to be bilateral lumbar radiculopathy. Osteoarthritis throughout the spine observed. No new weakness or numbness around the bottom. Examination unremarkable.  X-ray indicating significant degenerative changes and scoliosis, no masses.  -  Check vitamin B12 level to rule out deficiency. Consider gabapentin for nerve pain if needed. Use over-the-counter pain relievers as needed. Monitor for worsening symptoms, weakness, or difficulty with bladder function. If symptoms worsen, consider MRI for further evaluation.  - Risks and side effects: gabapentin may cause tiredness, grogginess, lightheadedness, or balance issues--- she will consider this and will let me know if she would like to try this medication. Use ibuprofen cautiously to avoid stomach irritation. Tylenol preferred for fewer side effects.                      Subjective   Alexandra is a 84 year old female who presents for Bilateral numbness in feet (On and off the past month)  History of Present Illness    Numbness and Tingling in Feet  - Numbness and tingling in feet, worsening since April 28th  - Symptoms improve with walking and worsen with prolonged sitting or lying down  - Tingling sensation primarily on the top of the feet, not the bottom  - Occasional sharp pain on the outside ankle and top of foot  - Tingling extends up to the knees, more pronounced on the outside of the legs  - No new weakness in legs  - No numbness or tingling around the bottom  - No difficulty emptying bladder or bowels  - Ibuprofen provides some  "relief  - History of left hip replacement in February, with swelling in left ankle post-surgery  - Feet appear white when elevated and flexed, red when at rest  - Swelling and redness have been present since hip surgery in February         Pertinent history obtain from: patient    Objective     Vital signs:  /60   Pulse 51   Temp 98.1  F (36.7  C) (Oral)   Resp 18   Ht 1.607 m (5' 3.25\")   Wt 72.1 kg (159 lb)   SpO2 99%   BMI 27.94 kg/m    Blood pressure 118/60, pulse 51, temperature 98.1  F (36.7  C), temperature source Oral, resp. rate 18, height 1.607 m (5' 3.25\"), weight 72.1 kg (159 lb), SpO2 99%, not currently breastfeeding.  Physical Exam    Alert very pleasant.  She has good peripheral pulses, normal capillary refill.  She has prominent superficial varicose veins cause a more deep red to purple discoloration when legs are at rest and not elevated.  When she raises her feet up and there is tension on her joints or against her skin, it appears more white, most consistent with venous insufficiency.  1+ edema of left leg, no significant edema of right, patient feels this is gradually improving since left hip surgery.  She has no focal tenderness to palpation of her lumbar spine or paraspinous muscles.  Mild discomfort right SI joint.        Results    - X-ray of lumbar spine: Significant osteoarthritis throughout the spine; narrowing of spinal canal suspected; scoliosis noted.  - Vitamin B12 level: Pending results.  - Diabetes screening: Normal (last month).  - Thyroid labs: Normal (last month).       Laboratory data and imaging listed below was reviewed prior to this encounter.             Consent was obtained from the patient to use an AI documentation tool in the creation of  this note          Answers submitted by the patient for this visit:  General Questionnaire (Submitted on 5/19/2025)  Chief Complaint: Chronic problems general questions HPI Form  How many days per week do you miss taking " your medication?: 0  General Concern (Submitted on 5/19/2025)  Chief Complaint: Chronic problems general questions HPI Form  What is the reason for your visit today?: numbness and tingel in feet and calf with ocassional sharp   shoting paing in sme area* swollen feet and ankles  When did your symptoms begin?: More than a month  What are your symptoms?: seem to increase when resting and at night  How would you describe these symptoms?: Moderate  Are your symptoms:: Worsening  Have you had these symptoms before?: No  Is there anything that makes you feel better?: walking and swimming seem to help temporaly  Questionnaire about: Chronic problems general questions HPI Form (Submitted on 5/19/2025)  Chief Complaint: Chronic problems general questions HPI Form

## 2025-05-19 NOTE — PATIENT INSTRUCTIONS
Scoliosis, your examination today is unremarkable.  You have significant osteoarthritis throughout your spine which is as expected.  We will have radiology review your x-ray and confirm this, we will call if there are any concerning findings seen by radiology.    We will check a vitamin B12 level today.  Last month your diabetes screening and thyroid labs were normal.     My suspicion is that you have narrowing of your spinal canal causing the tingling in your legs.  If it is worsening, if you develop any weakness, or if you develop numbness and tingling around her bottom or difficulty emptying her bladder, be sure to let us know right away.  We could consider gabapentin, nerve pain medicine, as needed.  In the meantime, you may use over-the-counter pain relievers as needed.  If worsening or more problematic, would consider MRI as neck step in evaluation.

## 2025-05-20 ENCOUNTER — RESULTS FOLLOW-UP (OUTPATIENT)
Dept: FAMILY MEDICINE | Facility: CLINIC | Age: 85
End: 2025-05-20

## 2025-06-07 DIAGNOSIS — I49.3 FREQUENT UNIFOCAL PREMATURE VENTRICULAR CONTRACTIONS: ICD-10-CM

## 2025-06-09 RX ORDER — PROPAFENONE HYDROCHLORIDE 150 MG/1
150 TABLET, COATED ORAL 2 TIMES DAILY
Qty: 180 TABLET | Refills: 0 | Status: SHIPPED | OUTPATIENT
Start: 2025-06-09

## (undated) DEVICE — SUTURE VICRYL+ 1 27IN CT-1 UND VCP261H

## (undated) DEVICE — SUTURE MONOCRYL 3-0 18 PS2 UND MCP497G

## (undated) DEVICE — DRAPE IOBAN 2 C-SECTION 3M 6697

## (undated) DEVICE — GLOVE BIOGEL PI ULTRATOUCH G SZ 8.0 42180

## (undated) DEVICE — SUCTION MANIFOLD NEPTUNE 2 SYS 4 PORT 0702-020-000

## (undated) DEVICE — SUTURE VICRYL+ 2 27IN CP UNDYED VCP195H

## (undated) DEVICE — SOL WATER IRRIG 1000ML BOTTLE 2F7114

## (undated) DEVICE — SOL NACL 0.9% INJ 1000ML BAG 2B1324X

## (undated) DEVICE — GLOVE BIOGEL PI INDICATOR 8.0 LF 41680

## (undated) DEVICE — SU ETHIBOND 5 V-37 4X30" MB66G

## (undated) DEVICE — GLOVE UNDER INDICATOR PI SZ 8.5 LF 41685

## (undated) DEVICE — SUCTION IRR SYSTEM W/O TIP INTERPULSE HANDPIECE 0210-100-000

## (undated) DEVICE — SOL NACL 0.9% IRRIG 1000ML BOTTLE 2F7124

## (undated) DEVICE — ELECTRODE PATIENT RETURN ADULT L10 FT 2 PLATE CORD 0855C

## (undated) DEVICE — BLADE PRECISION 25X1.27X9 6725127090

## (undated) DEVICE — GOWN IMPERVIOUS BREATHABLE 2XL/XLONG

## (undated) DEVICE — SU STRATAFIX PDS PLUS 2-0 SPIRAL CT-1 30CM SXPP1B410

## (undated) DEVICE — BONE CLEANING TIP INTERPULSE  0210-010-000

## (undated) DEVICE — SUCTION SLEEVE NEPTUNE 2 165MM 0703-005-165

## (undated) DEVICE — HOOD SURG T7 PLUS STRL LF DISP 0416-801-200

## (undated) DEVICE — CUSTOM PACK ANTERIOR HIP SOP5BAHHED

## (undated) DEVICE — KIT PATIENT CARE HANA TABLE PROFX SUPINE 6855

## (undated) DEVICE — ESU ELEC BLADE 6" COATED E1450-6

## (undated) DEVICE — VIAL DECANTER STERILE WHITE DYNJDEC06

## (undated) DEVICE — GLOVE BIOGEL PI ULTRATOUCH G SZ 8.5 42185

## (undated) RX ORDER — PROPOFOL 10 MG/ML
INJECTION, EMULSION INTRAVENOUS
Status: DISPENSED
Start: 2025-02-19

## (undated) RX ORDER — ONDANSETRON 2 MG/ML
INJECTION INTRAMUSCULAR; INTRAVENOUS
Status: DISPENSED
Start: 2025-02-19

## (undated) RX ORDER — FENTANYL CITRATE-0.9 % NACL/PF 10 MCG/ML
PLASTIC BAG, INJECTION (ML) INTRAVENOUS
Status: DISPENSED
Start: 2025-02-19

## (undated) RX ORDER — EPHEDRINE SULFATE 50 MG/ML
INJECTION, SOLUTION INTRAMUSCULAR; INTRAVENOUS; SUBCUTANEOUS
Status: DISPENSED
Start: 2025-02-19

## (undated) RX ORDER — FENTANYL CITRATE 50 UG/ML
INJECTION, SOLUTION INTRAMUSCULAR; INTRAVENOUS
Status: DISPENSED
Start: 2025-02-19

## (undated) RX ORDER — PHENYLEPHRINE HYDROCHLORIDE 10 MG/ML
INJECTION INTRAVENOUS
Status: DISPENSED
Start: 2025-02-19